# Patient Record
Sex: MALE | Race: WHITE | NOT HISPANIC OR LATINO | URBAN - METROPOLITAN AREA
[De-identification: names, ages, dates, MRNs, and addresses within clinical notes are randomized per-mention and may not be internally consistent; named-entity substitution may affect disease eponyms.]

---

## 2019-02-28 ENCOUNTER — OFFICE (OUTPATIENT)
Dept: URBAN - METROPOLITAN AREA CLINIC 42 | Facility: CLINIC | Age: 79
End: 2019-02-28

## 2019-02-28 VITALS — WEIGHT: 162 LBS

## 2019-02-28 DIAGNOSIS — R63.4 ABNORMAL WEIGHT LOSS: ICD-10-CM

## 2019-02-28 DIAGNOSIS — K52.9 NONINFECTIVE GASTROENTERITIS AND COLITIS, UNSPECIFIED: ICD-10-CM

## 2019-02-28 DIAGNOSIS — R63.0 ANOREXIA: ICD-10-CM

## 2019-02-28 DIAGNOSIS — R10.32 LEFT LOWER QUADRANT PAIN: ICD-10-CM

## 2019-02-28 DIAGNOSIS — R10.31 RIGHT LOWER QUADRANT PAIN: ICD-10-CM

## 2019-02-28 PROCEDURE — 99203 OFFICE O/P NEW LOW 30 MIN: CPT | Performed by: NURSE PRACTITIONER

## 2019-03-17 ENCOUNTER — INPATIENT HOSPITAL (OUTPATIENT)
Dept: URBAN - METROPOLITAN AREA HOSPITAL 90 | Facility: HOSPITAL | Age: 79
End: 2019-03-17
Payer: MEDICARE

## 2019-03-17 DIAGNOSIS — R11.0 NAUSEA: ICD-10-CM

## 2019-03-17 DIAGNOSIS — R93.3 ABNORMAL FINDINGS ON DIAGNOSTIC IMAGING OF OTHER PARTS OF DI: ICD-10-CM

## 2019-03-17 DIAGNOSIS — K57.32 DIVERTICULITIS OF LARGE INTESTINE WITHOUT PERFORATION OR ABS: ICD-10-CM

## 2019-03-17 PROCEDURE — 99223 1ST HOSP IP/OBS HIGH 75: CPT

## 2019-04-15 ENCOUNTER — ON CAMPUS - OUTPATIENT (OUTPATIENT)
Dept: URBAN - METROPOLITAN AREA HOSPITAL 91 | Facility: HOSPITAL | Age: 79
End: 2019-04-15
Payer: MEDICARE

## 2019-04-15 DIAGNOSIS — R63.4 ABNORMAL WEIGHT LOSS: ICD-10-CM

## 2019-04-15 DIAGNOSIS — K21.9 GASTRO-ESOPHAGEAL REFLUX DISEASE WITHOUT ESOPHAGITIS: ICD-10-CM

## 2019-04-15 DIAGNOSIS — K56.690 OTHER PARTIAL INTESTINAL OBSTRUCTION: ICD-10-CM

## 2019-04-15 DIAGNOSIS — R19.7 DIARRHEA, UNSPECIFIED: ICD-10-CM

## 2019-04-15 DIAGNOSIS — K57.30 DIVERTICULOSIS OF LARGE INTESTINE WITHOUT PERFORATION OR ABS: ICD-10-CM

## 2019-04-15 PROCEDURE — 45381 COLONOSCOPY SUBMUCOUS NJX: CPT

## 2019-04-15 PROCEDURE — 43239 EGD BIOPSY SINGLE/MULTIPLE: CPT

## 2019-04-15 PROCEDURE — 45380 COLONOSCOPY AND BIOPSY: CPT

## 2022-10-01 ENCOUNTER — APPOINTMENT (OUTPATIENT)
Dept: GENERAL RADIOLOGY | Facility: HOSPITAL | Age: 82
End: 2022-10-01

## 2022-10-01 ENCOUNTER — APPOINTMENT (OUTPATIENT)
Dept: CT IMAGING | Facility: HOSPITAL | Age: 82
End: 2022-10-01

## 2022-10-01 ENCOUNTER — HOSPITAL ENCOUNTER (INPATIENT)
Facility: HOSPITAL | Age: 82
LOS: 15 days | Discharge: HOSPICE | End: 2022-10-18
Attending: EMERGENCY MEDICINE | Admitting: STUDENT IN AN ORGANIZED HEALTH CARE EDUCATION/TRAINING PROGRAM

## 2022-10-01 DIAGNOSIS — R93.89 ABNORMAL CT OF THE CHEST: ICD-10-CM

## 2022-10-01 DIAGNOSIS — R10.84 GENERALIZED ABDOMINAL PAIN: ICD-10-CM

## 2022-10-01 DIAGNOSIS — A41.9 SEPSIS, DUE TO UNSPECIFIED ORGANISM, UNSPECIFIED WHETHER ACUTE ORGAN DYSFUNCTION PRESENT: ICD-10-CM

## 2022-10-01 DIAGNOSIS — K57.92 DIVERTICULITIS: Primary | ICD-10-CM

## 2022-10-01 DIAGNOSIS — R93.5 ABNORMAL CT OF THE ABDOMEN: ICD-10-CM

## 2022-10-01 LAB
ALBUMIN SERPL-MCNC: 3.5 G/DL (ref 3.5–5.2)
ALBUMIN/GLOB SERPL: 1.3 G/DL
ALP SERPL-CCNC: 77 U/L (ref 39–117)
ALT SERPL W P-5'-P-CCNC: 93 U/L (ref 1–41)
ANION GAP SERPL CALCULATED.3IONS-SCNC: 10 MMOL/L (ref 5–15)
AST SERPL-CCNC: 118 U/L (ref 1–40)
BASOPHILS # BLD AUTO: 0.09 10*3/MM3 (ref 0–0.2)
BASOPHILS NFR BLD AUTO: 0.4 % (ref 0–1.5)
BILIRUB SERPL-MCNC: 0.6 MG/DL (ref 0–1.2)
BILIRUB UR QL STRIP: NEGATIVE
BUN SERPL-MCNC: 14 MG/DL (ref 8–23)
BUN/CREAT SERPL: 22.2 (ref 7–25)
CALCIUM SPEC-SCNC: 9 MG/DL (ref 8.6–10.5)
CHLORIDE SERPL-SCNC: 102 MMOL/L (ref 98–107)
CLARITY UR: CLEAR
CO2 SERPL-SCNC: 26 MMOL/L (ref 22–29)
COLOR UR: YELLOW
CREAT SERPL-MCNC: 0.63 MG/DL (ref 0.76–1.27)
D-LACTATE SERPL-SCNC: 2.5 MMOL/L (ref 0.5–2)
DEPRECATED RDW RBC AUTO: 40.3 FL (ref 37–54)
EGFRCR SERPLBLD CKD-EPI 2021: 94.4 ML/MIN/1.73
EOSINOPHIL # BLD AUTO: 0.09 10*3/MM3 (ref 0–0.4)
EOSINOPHIL NFR BLD AUTO: 0.4 % (ref 0.3–6.2)
ERYTHROCYTE [DISTWIDTH] IN BLOOD BY AUTOMATED COUNT: 12 % (ref 12.3–15.4)
GLOBULIN UR ELPH-MCNC: 2.7 GM/DL
GLUCOSE SERPL-MCNC: 167 MG/DL (ref 65–99)
GLUCOSE UR STRIP-MCNC: NEGATIVE MG/DL
HCT VFR BLD AUTO: 45.4 % (ref 37.5–51)
HGB BLD-MCNC: 15.8 G/DL (ref 13–17.7)
HGB UR QL STRIP.AUTO: NEGATIVE
HOLD SPECIMEN: NORMAL
HOLD SPECIMEN: NORMAL
IMM GRANULOCYTES # BLD AUTO: 0.1 10*3/MM3 (ref 0–0.05)
IMM GRANULOCYTES NFR BLD AUTO: 0.5 % (ref 0–0.5)
KETONES UR QL STRIP: NEGATIVE
LEUKOCYTE ESTERASE UR QL STRIP.AUTO: NEGATIVE
LIPASE SERPL-CCNC: 13 U/L (ref 13–60)
LYMPHOCYTES # BLD AUTO: 3.11 10*3/MM3 (ref 0.7–3.1)
LYMPHOCYTES NFR BLD AUTO: 15.1 % (ref 19.6–45.3)
MCH RBC QN AUTO: 31.5 PG (ref 26.6–33)
MCHC RBC AUTO-ENTMCNC: 34.8 G/DL (ref 31.5–35.7)
MCV RBC AUTO: 90.4 FL (ref 79–97)
MONOCYTES # BLD AUTO: 1.15 10*3/MM3 (ref 0.1–0.9)
MONOCYTES NFR BLD AUTO: 5.6 % (ref 5–12)
NEUTROPHILS NFR BLD AUTO: 16.07 10*3/MM3 (ref 1.7–7)
NEUTROPHILS NFR BLD AUTO: 78 % (ref 42.7–76)
NITRITE UR QL STRIP: NEGATIVE
NRBC BLD AUTO-RTO: 0 /100 WBC (ref 0–0.2)
NT-PROBNP SERPL-MCNC: 206 PG/ML (ref 0–1800)
PH UR STRIP.AUTO: 7 [PH] (ref 5–8)
PLATELET # BLD AUTO: 216 10*3/MM3 (ref 140–450)
PMV BLD AUTO: 10.2 FL (ref 6–12)
POTASSIUM SERPL-SCNC: 3.7 MMOL/L (ref 3.5–5.2)
PROCALCITONIN SERPL-MCNC: 0.09 NG/ML (ref 0–0.25)
PROT SERPL-MCNC: 6.2 G/DL (ref 6–8.5)
PROT UR QL STRIP: NEGATIVE
QT INTERVAL: 361 MS
RBC # BLD AUTO: 5.02 10*6/MM3 (ref 4.14–5.8)
SODIUM SERPL-SCNC: 138 MMOL/L (ref 136–145)
SP GR UR STRIP: 1.02 (ref 1–1.03)
TROPONIN T SERPL-MCNC: <0.01 NG/ML (ref 0–0.03)
UROBILINOGEN UR QL STRIP: NORMAL
WBC NRBC COR # BLD: 20.61 10*3/MM3 (ref 3.4–10.8)
WHOLE BLOOD HOLD COAG: NORMAL
WHOLE BLOOD HOLD SPECIMEN: NORMAL

## 2022-10-01 PROCEDURE — 84484 ASSAY OF TROPONIN QUANT: CPT | Performed by: PHYSICIAN ASSISTANT

## 2022-10-01 PROCEDURE — 87040 BLOOD CULTURE FOR BACTERIA: CPT | Performed by: PHYSICIAN ASSISTANT

## 2022-10-01 PROCEDURE — 25010000002 VANCOMYCIN 10 G RECONSTITUTED SOLUTION: Performed by: PHYSICIAN ASSISTANT

## 2022-10-01 PROCEDURE — 81003 URINALYSIS AUTO W/O SCOPE: CPT | Performed by: EMERGENCY MEDICINE

## 2022-10-01 PROCEDURE — 84145 PROCALCITONIN (PCT): CPT | Performed by: PHYSICIAN ASSISTANT

## 2022-10-01 PROCEDURE — 83690 ASSAY OF LIPASE: CPT | Performed by: EMERGENCY MEDICINE

## 2022-10-01 PROCEDURE — 25010000002 CEFEPIME PER 500 MG: Performed by: PHYSICIAN ASSISTANT

## 2022-10-01 PROCEDURE — 25010000002 ONDANSETRON PER 1 MG: Performed by: EMERGENCY MEDICINE

## 2022-10-01 PROCEDURE — 99285 EMERGENCY DEPT VISIT HI MDM: CPT

## 2022-10-01 PROCEDURE — 93010 ELECTROCARDIOGRAM REPORT: CPT | Performed by: STUDENT IN AN ORGANIZED HEALTH CARE EDUCATION/TRAINING PROGRAM

## 2022-10-01 PROCEDURE — 85025 COMPLETE CBC W/AUTO DIFF WBC: CPT | Performed by: EMERGENCY MEDICINE

## 2022-10-01 PROCEDURE — 71045 X-RAY EXAM CHEST 1 VIEW: CPT

## 2022-10-01 PROCEDURE — 36415 COLL VENOUS BLD VENIPUNCTURE: CPT

## 2022-10-01 PROCEDURE — 0 IOPAMIDOL PER 1 ML: Performed by: EMERGENCY MEDICINE

## 2022-10-01 PROCEDURE — 25010000002 MORPHINE PER 10 MG: Performed by: EMERGENCY MEDICINE

## 2022-10-01 PROCEDURE — 83880 ASSAY OF NATRIURETIC PEPTIDE: CPT | Performed by: PHYSICIAN ASSISTANT

## 2022-10-01 PROCEDURE — 93005 ELECTROCARDIOGRAM TRACING: CPT | Performed by: PHYSICIAN ASSISTANT

## 2022-10-01 PROCEDURE — 80053 COMPREHEN METABOLIC PANEL: CPT | Performed by: EMERGENCY MEDICINE

## 2022-10-01 PROCEDURE — 74177 CT ABD & PELVIS W/CONTRAST: CPT

## 2022-10-01 PROCEDURE — 83605 ASSAY OF LACTIC ACID: CPT | Performed by: EMERGENCY MEDICINE

## 2022-10-01 PROCEDURE — 71275 CT ANGIOGRAPHY CHEST: CPT

## 2022-10-01 RX ORDER — ONDANSETRON 2 MG/ML
4 INJECTION INTRAMUSCULAR; INTRAVENOUS ONCE
Status: COMPLETED | OUTPATIENT
Start: 2022-10-01 | End: 2022-10-01

## 2022-10-01 RX ORDER — SODIUM CHLORIDE 0.9 % (FLUSH) 0.9 %
10 SYRINGE (ML) INJECTION AS NEEDED
Status: DISCONTINUED | OUTPATIENT
Start: 2022-10-01 | End: 2022-10-18 | Stop reason: HOSPADM

## 2022-10-01 RX ORDER — SODIUM CHLORIDE 0.9 % (FLUSH) 0.9 %
10 SYRINGE (ML) INJECTION AS NEEDED
Status: DISCONTINUED | OUTPATIENT
Start: 2022-10-01 | End: 2022-10-01

## 2022-10-01 RX ORDER — MORPHINE SULFATE 2 MG/ML
2 INJECTION, SOLUTION INTRAMUSCULAR; INTRAVENOUS ONCE
Status: COMPLETED | OUTPATIENT
Start: 2022-10-01 | End: 2022-10-01

## 2022-10-01 RX ADMIN — CEFEPIME 2 G: 2 INJECTION, POWDER, FOR SOLUTION INTRAVENOUS at 20:45

## 2022-10-01 RX ADMIN — ONDANSETRON 4 MG: 2 INJECTION INTRAMUSCULAR; INTRAVENOUS at 22:16

## 2022-10-01 RX ADMIN — VANCOMYCIN HYDROCHLORIDE 1750 MG: 10 INJECTION, POWDER, LYOPHILIZED, FOR SOLUTION INTRAVENOUS at 22:13

## 2022-10-01 RX ADMIN — MORPHINE SULFATE 2 MG: 2 INJECTION, SOLUTION INTRAMUSCULAR; INTRAVENOUS at 22:16

## 2022-10-01 RX ADMIN — SODIUM CHLORIDE 1000 ML: 9 INJECTION, SOLUTION INTRAVENOUS at 22:04

## 2022-10-01 RX ADMIN — IOPAMIDOL 100 ML: 755 INJECTION, SOLUTION INTRAVENOUS at 21:13

## 2022-10-01 RX ADMIN — SODIUM CHLORIDE 500 ML: 9 INJECTION, SOLUTION INTRAVENOUS at 20:40

## 2022-10-01 NOTE — ED TRIAGE NOTES
"Pt to ER from home via PRP (incident # [id number]) with c/o RLQ abd pain and n/v for a \"few\" hours. Pt unable to elaborate further. Pt alert and oriented however speech is garbled, which is baseline. Pt placed on2L NC by EMS.       Pt masked in triage, staff in appropriate ppe.    "

## 2022-10-01 NOTE — ED PROVIDER NOTES
EMERGENCY DEPARTMENT ENCOUNTER    Room Number:  06/06  Date of encounter:  10/2/2022  PCP: System, Provider Not In  Historian: Patient      HPI:  Chief Complaint: Right lower quadrant pain     A complete HPI/ROS/PMH/PSH/SH/FH are unobtainable due to: Nothing    Context: Boni Vega is a 81 y.o. male who presents from home to the ED c/o lower abdomen and right lower quadrant pain began few hours PTA.  Patient states the pain is associated with nausea and vomiting.  He was noted by the RN staff when patient arrived that his SPO2 was between 90 and 92%.  He was initially placed on 2 L O2.  Per the family the patient has a past medical history of aspiration and given the vomiting that preceded before his arrival they are unsure if this may have occurred.  Patient states his pain is moderate at this time.  Nothing makes it better or worse.  It does wax and wane in severity slightly.  He denies fever, chills, chest pain, abnormal bowel movements.    Patient has had a cholecystectomy in the past.      PAST MEDICAL HISTORY  Active Ambulatory Problems     Diagnosis Date Noted   • No Active Ambulatory Problems     Resolved Ambulatory Problems     Diagnosis Date Noted   • No Resolved Ambulatory Problems     No Additional Past Medical History         PAST SURGICAL HISTORY  No past surgical history on file.      FAMILY HISTORY  No family history on file.      SOCIAL HISTORY  Social History     Socioeconomic History   • Marital status: Single         ALLERGIES  Patient has no known allergies.        REVIEW OF SYSTEMS  Review of Systems   Constitutional: Negative for chills and fever.   HENT: Negative.    Respiratory: Positive for shortness of breath.    Cardiovascular: Negative for chest pain and palpitations.   Gastrointestinal: Positive for abdominal pain and nausea.   Genitourinary: Negative.    Musculoskeletal: Negative.    Skin: Negative.    Neurological: Negative.    Psychiatric/Behavioral: Negative.         All systems  reviewed and negative except for those discussed in HPI.       PHYSICAL EXAM    I have reviewed the triage vital signs and nursing notes.    ED Triage Vitals [10/01/22 1921]   Temp Heart Rate Resp BP SpO2   98.9 °F (37.2 °C) 91 16 142/94 93 %      Temp src Heart Rate Source Patient Position BP Location FiO2 (%)   -- -- -- -- --       Physical Exam  GENERAL: Frail in appearance, appears somewhat uncomfortable  HENT: nares patent, mucous brains dry  EYES: no scleral icterus  CV: regular rhythm, regular rate  RESPIRATORY: Slight rales rhonchi at the base of bilateral lungs, patient is slightly tachypneic  ABDOMEN: TTP lower abdomen, most tender right lower quadrant MUSCULOSKELETAL: no deformity  NEURO: alert and oriented x2, moves all extremities, follows commands, patient has obvious right-sided weakness which is said to be chronic per the family   SKIN: warm, dry        LAB RESULTS  Recent Results (from the past 24 hour(s))   Comprehensive Metabolic Panel    Collection Time: 10/01/22  7:39 PM    Specimen: Blood   Result Value Ref Range    Glucose 167 (H) 65 - 99 mg/dL    BUN 14 8 - 23 mg/dL    Creatinine 0.63 (L) 0.76 - 1.27 mg/dL    Sodium 138 136 - 145 mmol/L    Potassium 3.7 3.5 - 5.2 mmol/L    Chloride 102 98 - 107 mmol/L    CO2 26.0 22.0 - 29.0 mmol/L    Calcium 9.0 8.6 - 10.5 mg/dL    Total Protein 6.2 6.0 - 8.5 g/dL    Albumin 3.50 3.50 - 5.20 g/dL    ALT (SGPT) 93 (H) 1 - 41 U/L    AST (SGOT) 118 (H) 1 - 40 U/L    Alkaline Phosphatase 77 39 - 117 U/L    Total Bilirubin 0.6 0.0 - 1.2 mg/dL    Globulin 2.7 gm/dL    A/G Ratio 1.3 g/dL    BUN/Creatinine Ratio 22.2 7.0 - 25.0    Anion Gap 10.0 5.0 - 15.0 mmol/L    eGFR 94.4 >60.0 mL/min/1.73   Lipase    Collection Time: 10/01/22  7:39 PM    Specimen: Blood   Result Value Ref Range    Lipase 13 13 - 60 U/L   Green Top (Gel)    Collection Time: 10/01/22  7:39 PM   Result Value Ref Range    Extra Tube Hold for add-ons.    Lavender Top    Collection Time: 10/01/22   7:39 PM   Result Value Ref Range    Extra Tube hold for add-on    Gold Top - SST    Collection Time: 10/01/22  7:39 PM   Result Value Ref Range    Extra Tube Hold for add-ons.    Light Blue Top    Collection Time: 10/01/22  7:39 PM   Result Value Ref Range    Extra Tube Hold for add-ons.    CBC Auto Differential    Collection Time: 10/01/22  7:39 PM    Specimen: Blood   Result Value Ref Range    WBC 20.61 (H) 3.40 - 10.80 10*3/mm3    RBC 5.02 4.14 - 5.80 10*6/mm3    Hemoglobin 15.8 13.0 - 17.7 g/dL    Hematocrit 45.4 37.5 - 51.0 %    MCV 90.4 79.0 - 97.0 fL    MCH 31.5 26.6 - 33.0 pg    MCHC 34.8 31.5 - 35.7 g/dL    RDW 12.0 (L) 12.3 - 15.4 %    RDW-SD 40.3 37.0 - 54.0 fl    MPV 10.2 6.0 - 12.0 fL    Platelets 216 140 - 450 10*3/mm3    Neutrophil % 78.0 (H) 42.7 - 76.0 %    Lymphocyte % 15.1 (L) 19.6 - 45.3 %    Monocyte % 5.6 5.0 - 12.0 %    Eosinophil % 0.4 0.3 - 6.2 %    Basophil % 0.4 0.0 - 1.5 %    Immature Grans % 0.5 0.0 - 0.5 %    Neutrophils, Absolute 16.07 (H) 1.70 - 7.00 10*3/mm3    Lymphocytes, Absolute 3.11 (H) 0.70 - 3.10 10*3/mm3    Monocytes, Absolute 1.15 (H) 0.10 - 0.90 10*3/mm3    Eosinophils, Absolute 0.09 0.00 - 0.40 10*3/mm3    Basophils, Absolute 0.09 0.00 - 0.20 10*3/mm3    Immature Grans, Absolute 0.10 (H) 0.00 - 0.05 10*3/mm3    nRBC 0.0 0.0 - 0.2 /100 WBC   Procalcitonin    Collection Time: 10/01/22  7:39 PM    Specimen: Blood   Result Value Ref Range    Procalcitonin 0.09 0.00 - 0.25 ng/mL   BNP    Collection Time: 10/01/22  7:39 PM    Specimen: Blood   Result Value Ref Range    proBNP 206.0 0.0 - 1,800.0 pg/mL   Troponin    Collection Time: 10/01/22  7:39 PM    Specimen: Blood   Result Value Ref Range    Troponin T <0.010 0.000 - 0.030 ng/mL   Lactic Acid, Plasma    Collection Time: 10/01/22  7:56 PM    Specimen: Blood   Result Value Ref Range    Lactate 2.5 (C) 0.5 - 2.0 mmol/L   Urinalysis With Microscopic If Indicated (No Culture) - Urine, Clean Catch    Collection Time: 10/01/22   8:34 PM    Specimen: Urine, Clean Catch   Result Value Ref Range    Color, UA Yellow Yellow, Straw    Appearance, UA Clear Clear    pH, UA 7.0 5.0 - 8.0    Specific Gravity, UA 1.016 1.005 - 1.030    Glucose, UA Negative Negative    Ketones, UA Negative Negative    Bilirubin, UA Negative Negative    Blood, UA Negative Negative    Protein, UA Negative Negative    Leuk Esterase, UA Negative Negative    Nitrite, UA Negative Negative    Urobilinogen, UA 1.0 E.U./dL 0.2 - 1.0 E.U./dL   ECG 12 Lead    Collection Time: 10/01/22  8:42 PM   Result Value Ref Range    QT Interval 361 ms   STAT Lactic Acid, Reflex    Collection Time: 10/01/22 11:52 PM    Specimen: Blood   Result Value Ref Range    Lactate 3.4 (C) 0.5 - 2.0 mmol/L       Ordered the above labs and independently reviewed the results.        RADIOLOGY  CT Abdomen Pelvis With Contrast, CT Angiogram Chest    Result Date: 10/1/2022  CT ANGIOGRAM OF THE CHEST; CT OF THE ABDOMEN AND PELVIS WITH CONTRAST  HISTORY: Hypoxia and tachypnea. Abdominal pain.  COMPARISON: 01/23/2007  TECHNIQUE: Axial CT imaging was obtained through the thorax. IV contrast was administered. Three-D reformatted images were obtained. Axial CT imaging was obtained through the abdomen and pelvis. IV contrast was administered.  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 Proximal descending thoracic aorta is dilated, measuring 3.3 cm. Distal descending thoracic aorta tapers to normal caliber. No acute pulmonary thromboembolus is seen. The thyroid gland, trachea, and esophagus appear unremarkable. There are coronary artery calcifications. There are advanced background emphysematous changes. Dependent consolidation is noted bilaterally. Some of this may reflect atelectasis, but pneumonia may be present as well, particularly within the left lower lobe. The patient has a nodule within the left upper lobe, which measures up to 1.5 cm.  Malignancy cannot be excluded. There is also an enlarged left hilar node, which measures up to 2.3 x 1.5 cm. Patient does appear to have some debris within the bronchi to the right lower lobe. Thyroid gland and trachea are unremarkable. Left-sided generator is noted overlying the left chest wall. There are changes of prior vertebroplasty at L1.  CT OF THE ABDOMEN AND PELVIS: Images are severely compromised by motion and streak artifact. Heterogeneous lesions are seen within the right lobe of the liver, difficult to fully assess, given the presence of the streak artifact from the patient's arms. However, the larger measures up to 7.9 x 5.4 cm. Malignancy cannot be excluded. 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 Patient does have intraluminal mural thrombus. Prostate gland contains dystrophic calcifications. Urinary bladder contains excreted contrast material. There is colonic diverticulosis. Proximal sigmoid colon does appear thick walled, with some adjacent pericolonic soft tissue stranding. Diverticulitis is not excluded. There is no extraluminal gas or organized fluid collection suggest perforation with abscess formation. Patient does have some free fluid within the paracolic gutters. The patient is osteoporotic. Intramedullary ritesh is identified within the proximal left femur. Calcified granulomata are seen within the spleen. Pancreas is atrophic. Gallbladder is absent. Kidneys enhance symmetrically. There is no hydronephrosis.       1. Bibasilar consolidation may reflect atelectasis, although correlation with any evidence of pneumonia is recommended. 2. Left upper lobe pulmonary nodule, as well as enlarged left hilar lymph node. Malignancy cannot be excluded. 3. Apparent heterogeneous lesions within the right lobe of the liver. Neoplasm versus abscess would be considerations. Full assessment of the liver lesions is severely limited due to streak artifact  and motion artifact. Liver protocol CT or MRI could be considered for further assessment. 4. Diverticulosis, as well as a thick walled segment of sigmoid colon. This may reflect diverticulitis, although follow-up colonoscopy is suggested to exclude any underlying neoplasm. 5. Patient does have some free fluid tracking along the paracolic gutters bilaterally. There may be some additional free fluid around the liver and spleen.  Radiation dose reduction techniques were utilized, including automated exposure control and exposure modulation based on body size.  This report was finalized on 10/1/2022 10:42 PM by Dr. Soheila Mann M.D.      XR Chest 1 View    Result Date: 10/1/2022  SINGLE VIEW OF THE CHEST  HISTORY: Shortness of breath and hypoxia  COMPARISON: None available.  FINDINGS: Cardiomegaly is present. There is no vascular congestion. Background emphysematous changes are seen. There is some nonspecific bibasilar consolidation, left greater than right. No obvious pneumothorax or pleural effusion is seen. Left-sided generator is seen overlying the left chest.      Nonspecific bibasilar consolidation.  This report was finalized on 10/1/2022 11:00 PM by Dr. Soheila Mann M.D.        I ordered the above noted radiological studies. Reviewed by me and discussed with radiologist.  See dictation for official radiology interpretation.      PROCEDURES    Critical Care  Performed by: Jacob Yang III, PA  Authorized by: Tremaine Haro MD     Critical care provider statement:     Critical care time (minutes):  33    Critical care time was exclusive of:  Separately billable procedures and treating other patients    Critical care was necessary to treat or prevent imminent or life-threatening deterioration of the following conditions:  Circulatory failure and sepsis    Critical care was time spent personally by me on the following activities:  Blood draw for specimens, development of treatment plan with  patient or surrogate, discussions with consultants, evaluation of patient's response to treatment, examination of patient, obtaining history from patient or surrogate, ordering and performing treatments and interventions, ordering and review of laboratory studies, ordering and review of radiographic studies, pulse oximetry, re-evaluation of patient's condition and review of old charts    I assumed direction of critical care for this patient from another provider in my specialty: yes      Care discussed with: admitting provider            MEDICATIONS GIVEN IN ER    Medications   sodium chloride 0.9 % flush 10 mL (has no administration in time range)   sodium chloride 0.9 % flush 10 mL (10 mL Intravenous Given 10/2/22 0041)   sodium chloride 0.9 % flush 10 mL (has no administration in time range)   nitroglycerin (NITROSTAT) SL tablet 0.4 mg (has no administration in time range)   acetaminophen (TYLENOL) tablet 650 mg (has no administration in time range)     Or   acetaminophen (TYLENOL) 160 MG/5ML solution 650 mg (has no administration in time range)     Or   acetaminophen (TYLENOL) suppository 650 mg (has no administration in time range)   ondansetron (ZOFRAN) tablet 4 mg (has no administration in time range)     Or   ondansetron (ZOFRAN) injection 4 mg (has no administration in time range)   calcium carbonate (TUMS) chewable tablet 500 mg (200 mg elemental) (has no administration in time range)   sodium chloride 0.9 % infusion (100 mL/hr Intravenous Currently Infusing 10/2/22 0426)   Pharmacy to dose vancomycin (has no administration in time range)   Pharmacy Consult - Pharmacy to dose (has no administration in time range)   morphine injection 2 mg (has no administration in time range)   ipratropium-albuterol (DUO-NEB) nebulizer solution 3 mL (has no administration in time range)   ipratropium-albuterol (DUO-NEB) nebulizer solution 3 mL (has no administration in time range)   cefepime 2 gm IVPB in 100 ml NS (VTB) (2  g Intravenous New Bag 10/2/22 0453)   morphine injection 2 mg (has no administration in time range)   ondansetron (ZOFRAN) injection 4 mg (has no administration in time range)   sodium chloride 0.9 % bolus 500 mL (0 mL Intravenous Stopped 10/1/22 2128)   cefepime 2 gm IVPB in 100 ml NS (VTB) (0 g Intravenous Stopped 10/1/22 2128)   vancomycin 1750 mg/500 mL 0.9% NS IVPB ([facility]) (0 mg/kg × 83.9 kg Intravenous Stopped 10/2/22 0019)   iopamidol (ISOVUE-370) 76 % injection 100 mL (100 mL Intravenous Given 10/1/22 2113)   sodium chloride 0.9 % bolus 1,000 mL (0 mL Intravenous Stopped 10/1/22 2306)   morphine injection 2 mg (2 mg Intravenous Given 10/1/22 2216)   ondansetron (ZOFRAN) injection 4 mg (4 mg Intramuscular Given 10/1/22 2216)   sodium chloride 0.9 % bolus 2,517 mL (0 mL/kg × 83.9 kg Intravenous Stopped 10/2/22 0143)         PROGRESS, DATA ANALYSIS, CONSULTS, AND MEDICAL DECISION MAKING    All labs have been independently reviewed by me.  All radiology studies have been reviewed by me and discussed with radiologist dictating the report.   EKG's independently viewed and interpreted by me.  Discussion below represents my analysis of pertinent findings related to patient's condition, differential diagnosis, treatment plan and final disposition.    DDx for the abdominal pain: SBO, partial SBO, acute appendicitis, pancreatitis, colitis, ischemic bowel, hernia, ureteral stone, cystitis, pyelonephritis, epiploic appendagitis, other intra-abdominal process.    DDx for the shortness of breath/hypoxemia: CHF, PNA, aspiration injury, COVID-19, PE, other viral illness.    Will obtain CBC, CMP, lipase, blood cultures, lactate, Pro-J Luis, EKG, BNP, troponin, portable chest x-ray, CT abdomen pelvis, CTA of the chest to further evaluate.    ED Course as of 10/02/22 0529   Sat Oct 01, 2022   1957 BP: 142/94 [RC]   1957 Temp: 98.9 °F (37.2 °C) [RC]   1957 Heart Rate: 91 [RC]   1957 Resp: 16 [RC]   1957 SpO2: 93 %  NC []   1957  WBC(!): 20.61 [RC]   1957 RBC: 5.02 [RC]   1957 Hemoglobin: 15.8 [RC]   1957 Hematocrit: 45.4 [RC]   1957 Platelets: 216 [RC]   2028 Glucose(!): 167 [RC]   2028 BUN: 14 [RC]   2028 Creatinine(!): 0.63 [RC]   2028 Sodium: 138 [RC]   2028 CO2: 26.0 [RC]   2028 Anion Gap: 10.0 [RC]   2028 ALT (SGPT)(!): 93 [RC]   2028 AST (SGOT)(!): 118 [RC]   2028 Alkaline Phosphatase: 77 [RC]   2028 Total Bilirubin: 0.6 [RC]   2036 Basophils Absolute: 0.09 [RC]   2037 RN reports the patient systolic blood pressure is now slightly over 100.  Still waiting on the BNP and chest x-ray and the patient does sound a little wet to me on exam.  We will go ahead and give a 500 cc bolus.  This patient is almost certainly septic.  We will start cefepime and Zosyn. [RC]   2048 EKG    Limited by artifact from spinal stimulator    EKG time: 0842  Rhythm/Rate: 94/sinus  P waves and IA: prolonged pr interval  QRS, axis: LAD   ST and T waves: No obvious st cgs, non-specific t wave cgs inf leads.     Interpreted Contemporaneously by me, independently viewed  -no prior [RC]   2305 CT angio of the chest shows questionable pneumonia.  CT abdomen pelvis shows likely diverticulitis and there is some question as to a liver abscess versus..  Patient has been covered with cefepime and vancomycin.  He has received IV fluids and is remained stable throughout his 4 hours in the emergency department.  Will place call the hospitalist to discuss admission to telemetry bed.  Working diagnosis will be sepsis, diverticulitis, abnormal CT chest, abnormal CT abdomen.  Patient's vitals have remained stable throughout his 4-4 and half hour ER stay. [RC]   5975 Discussed patient's case with OSMANI Jensen with [facility].  To admit to Dr. Gomez in a tele bed [RC]   Sun Oct 02, 2022   0032 Patient is a hold in the ER.  Vital signs remained stable.  His lactate actually went up.  He seems to have tolerated the first liter and a half of fluid.  We will give the complete  sepsis bolus at this time and then start the drip that was ordered on the floor.  We will continue to monitor closely and should anything change for the worse, we will notify the hospitalist and consult the ICU. [RC]   0529 Patient reports to the RN his pain is starting to worsen in the lower abdomen.  He did well with the IV morphine earlier.  We will give another small 2 mg dose via IV in an effort to better control his pain. [RC]      ED Course User Index  [RC] Jacob Yang III, PA           PPE: The patient wore a surgical mask throughout the entire patient encounter. I wore an N95.    AS OF 05:29 EDT VITALS:    BP - 105/77  HR - 86  TEMP - 98.9 °F (37.2 °C)  O2 SATS - 91%        DIAGNOSIS  Final diagnoses:   Diverticulitis   Sepsis, due to unspecified organism, unspecified whether acute organ dysfunction present (HCC)   Abnormal CT of the chest   Abnormal CT of the abdomen         DISPOSITION  ADMISSION    Discussed treatment plan and reason for admission with pt/family and admitting physician.  Pt/family voiced understanding of the plan for admission for further testing/treatment as needed.              Jacob Yang III, PA  10/02/22 0001       Jacob Yang III, PA  10/02/22 0513       Jacob Yang III, PA  10/02/22 0529

## 2022-10-02 LAB
ANION GAP SERPL CALCULATED.3IONS-SCNC: 9.8 MMOL/L (ref 5–15)
BUN SERPL-MCNC: 13 MG/DL (ref 8–23)
BUN/CREAT SERPL: 21 (ref 7–25)
CALCIUM SPEC-SCNC: 7.7 MG/DL (ref 8.6–10.5)
CHLORIDE SERPL-SCNC: 106 MMOL/L (ref 98–107)
CO2 SERPL-SCNC: 23.2 MMOL/L (ref 22–29)
CREAT SERPL-MCNC: 0.62 MG/DL (ref 0.76–1.27)
D-LACTATE SERPL-SCNC: 2 MMOL/L (ref 0.5–2)
D-LACTATE SERPL-SCNC: 2.2 MMOL/L (ref 0.5–2)
D-LACTATE SERPL-SCNC: 2.4 MMOL/L (ref 0.5–2)
D-LACTATE SERPL-SCNC: 2.4 MMOL/L (ref 0.5–2)
D-LACTATE SERPL-SCNC: 3.4 MMOL/L (ref 0.5–2)
DEPRECATED RDW RBC AUTO: 40.8 FL (ref 37–54)
EGFRCR SERPLBLD CKD-EPI 2021: 94.8 ML/MIN/1.73
ERYTHROCYTE [DISTWIDTH] IN BLOOD BY AUTOMATED COUNT: 12 % (ref 12.3–15.4)
GLUCOSE SERPL-MCNC: 144 MG/DL (ref 65–99)
HCT VFR BLD AUTO: 36.2 % (ref 37.5–51)
HGB BLD-MCNC: 12.4 G/DL (ref 13–17.7)
MCH RBC QN AUTO: 31.4 PG (ref 26.6–33)
MCHC RBC AUTO-ENTMCNC: 34.3 G/DL (ref 31.5–35.7)
MCV RBC AUTO: 91.6 FL (ref 79–97)
PLATELET # BLD AUTO: 177 10*3/MM3 (ref 140–450)
PMV BLD AUTO: 10.4 FL (ref 6–12)
POTASSIUM SERPL-SCNC: 3.8 MMOL/L (ref 3.5–5.2)
RBC # BLD AUTO: 3.95 10*6/MM3 (ref 4.14–5.8)
SODIUM SERPL-SCNC: 139 MMOL/L (ref 136–145)
WBC NRBC COR # BLD: 17.59 10*3/MM3 (ref 3.4–10.8)

## 2022-10-02 PROCEDURE — 99204 OFFICE O/P NEW MOD 45 MIN: CPT | Performed by: INTERNAL MEDICINE

## 2022-10-02 PROCEDURE — 25010000002 CEFEPIME PER 500 MG: Performed by: NURSE PRACTITIONER

## 2022-10-02 PROCEDURE — 25010000002 MORPHINE PER 10 MG: Performed by: EMERGENCY MEDICINE

## 2022-10-02 PROCEDURE — G0378 HOSPITAL OBSERVATION PER HR: HCPCS

## 2022-10-02 PROCEDURE — 25010000002 VANCOMYCIN PER 500 MG: Performed by: NURSE PRACTITIONER

## 2022-10-02 PROCEDURE — 85027 COMPLETE CBC AUTOMATED: CPT | Performed by: NURSE PRACTITIONER

## 2022-10-02 PROCEDURE — 36415 COLL VENOUS BLD VENIPUNCTURE: CPT | Performed by: EMERGENCY MEDICINE

## 2022-10-02 PROCEDURE — 25010000002 ONDANSETRON PER 1 MG: Performed by: EMERGENCY MEDICINE

## 2022-10-02 PROCEDURE — 80048 BASIC METABOLIC PNL TOTAL CA: CPT | Performed by: NURSE PRACTITIONER

## 2022-10-02 PROCEDURE — 25010000002 MORPHINE PER 10 MG: Performed by: NURSE PRACTITIONER

## 2022-10-02 PROCEDURE — 25010000002 ENOXAPARIN PER 10 MG: Performed by: STUDENT IN AN ORGANIZED HEALTH CARE EDUCATION/TRAINING PROGRAM

## 2022-10-02 PROCEDURE — 83605 ASSAY OF LACTIC ACID: CPT | Performed by: EMERGENCY MEDICINE

## 2022-10-02 PROCEDURE — 99223 1ST HOSP IP/OBS HIGH 75: CPT | Performed by: SURGERY

## 2022-10-02 RX ORDER — MONTELUKAST SODIUM 10 MG/1
10 TABLET ORAL NIGHTLY
Status: DISCONTINUED | OUTPATIENT
Start: 2022-10-02 | End: 2022-10-18 | Stop reason: HOSPADM

## 2022-10-02 RX ORDER — SODIUM CHLORIDE 0.9 % (FLUSH) 0.9 %
10 SYRINGE (ML) INJECTION EVERY 12 HOURS SCHEDULED
Status: DISCONTINUED | OUTPATIENT
Start: 2022-10-02 | End: 2022-10-13

## 2022-10-02 RX ORDER — ONDANSETRON 4 MG/1
4 TABLET, FILM COATED ORAL EVERY 6 HOURS PRN
Status: DISCONTINUED | OUTPATIENT
Start: 2022-10-02 | End: 2022-10-13

## 2022-10-02 RX ORDER — IPRATROPIUM BROMIDE AND ALBUTEROL SULFATE 2.5; .5 MG/3ML; MG/3ML
3 SOLUTION RESPIRATORY (INHALATION) EVERY 4 HOURS PRN
Status: DISCONTINUED | OUTPATIENT
Start: 2022-10-02 | End: 2022-10-13

## 2022-10-02 RX ORDER — MULTIPLE VITAMINS W/ MINERALS TAB 9MG-400MCG
1 TAB ORAL DAILY
COMMUNITY
End: 2022-10-18 | Stop reason: HOSPADM

## 2022-10-02 RX ORDER — IPRATROPIUM BROMIDE AND ALBUTEROL SULFATE 2.5; .5 MG/3ML; MG/3ML
3 SOLUTION RESPIRATORY (INHALATION)
Status: DISCONTINUED | OUTPATIENT
Start: 2022-10-02 | End: 2022-10-02

## 2022-10-02 RX ORDER — CETIRIZINE HYDROCHLORIDE 10 MG/1
5 TABLET ORAL DAILY
Status: DISCONTINUED | OUTPATIENT
Start: 2022-10-02 | End: 2022-10-18 | Stop reason: HOSPADM

## 2022-10-02 RX ORDER — DULOXETIN HYDROCHLORIDE 60 MG/1
60 CAPSULE, DELAYED RELEASE ORAL DAILY
COMMUNITY

## 2022-10-02 RX ORDER — ASPIRIN 81 MG/1
81 TABLET, CHEWABLE ORAL DAILY
Status: DISCONTINUED | OUTPATIENT
Start: 2022-10-02 | End: 2022-10-02

## 2022-10-02 RX ORDER — HYDROMORPHONE HYDROCHLORIDE 1 MG/ML
0.5 INJECTION, SOLUTION INTRAMUSCULAR; INTRAVENOUS; SUBCUTANEOUS
Status: ACTIVE | OUTPATIENT
Start: 2022-10-02 | End: 2022-10-09

## 2022-10-02 RX ORDER — ONDANSETRON 2 MG/ML
4 INJECTION INTRAMUSCULAR; INTRAVENOUS EVERY 6 HOURS PRN
Status: DISCONTINUED | OUTPATIENT
Start: 2022-10-02 | End: 2022-10-13

## 2022-10-02 RX ORDER — LORATADINE 10 MG/1
10 TABLET ORAL DAILY
COMMUNITY
End: 2022-10-18 | Stop reason: HOSPADM

## 2022-10-02 RX ORDER — ASPIRIN 81 MG/1
81 TABLET, CHEWABLE ORAL DAILY
COMMUNITY
End: 2022-10-18 | Stop reason: HOSPADM

## 2022-10-02 RX ORDER — SODIUM CHLORIDE 9 MG/ML
100 INJECTION, SOLUTION INTRAVENOUS CONTINUOUS
Status: DISCONTINUED | OUTPATIENT
Start: 2022-10-02 | End: 2022-10-06

## 2022-10-02 RX ORDER — LISINOPRIL 20 MG/1
20 TABLET ORAL DAILY
COMMUNITY
End: 2022-10-18 | Stop reason: HOSPADM

## 2022-10-02 RX ORDER — PANTOPRAZOLE SODIUM 20 MG/1
20 TABLET, DELAYED RELEASE ORAL DAILY
COMMUNITY
End: 2022-10-18 | Stop reason: HOSPADM

## 2022-10-02 RX ORDER — CALCIUM CARBONATE 200(500)MG
2 TABLET,CHEWABLE ORAL 2 TIMES DAILY PRN
Status: DISCONTINUED | OUTPATIENT
Start: 2022-10-02 | End: 2022-10-13

## 2022-10-02 RX ORDER — MORPHINE SULFATE 2 MG/ML
2 INJECTION, SOLUTION INTRAMUSCULAR; INTRAVENOUS ONCE
Status: COMPLETED | OUTPATIENT
Start: 2022-10-02 | End: 2022-10-02

## 2022-10-02 RX ORDER — PANTOPRAZOLE SODIUM 20 MG/1
20 TABLET, DELAYED RELEASE ORAL DAILY
Status: DISCONTINUED | OUTPATIENT
Start: 2022-10-02 | End: 2022-10-03

## 2022-10-02 RX ORDER — TAMSULOSIN HYDROCHLORIDE 0.4 MG/1
1 CAPSULE ORAL NIGHTLY
COMMUNITY

## 2022-10-02 RX ORDER — MORPHINE SULFATE 2 MG/ML
2 INJECTION, SOLUTION INTRAMUSCULAR; INTRAVENOUS
Status: DISCONTINUED | OUTPATIENT
Start: 2022-10-02 | End: 2022-10-03

## 2022-10-02 RX ORDER — DULOXETIN HYDROCHLORIDE 60 MG/1
60 CAPSULE, DELAYED RELEASE ORAL DAILY
Status: DISCONTINUED | OUTPATIENT
Start: 2022-10-02 | End: 2022-10-18 | Stop reason: HOSPADM

## 2022-10-02 RX ORDER — OXYCODONE HYDROCHLORIDE AND ACETAMINOPHEN 5; 325 MG/1; MG/1
1 TABLET ORAL EVERY 6 HOURS PRN
Status: DISPENSED | OUTPATIENT
Start: 2022-10-02 | End: 2022-10-09

## 2022-10-02 RX ORDER — ACETAMINOPHEN 160 MG/5ML
650 SOLUTION ORAL EVERY 4 HOURS PRN
Status: DISCONTINUED | OUTPATIENT
Start: 2022-10-02 | End: 2022-10-13

## 2022-10-02 RX ORDER — ACETAMINOPHEN 650 MG/1
650 SUPPOSITORY RECTAL EVERY 4 HOURS PRN
Status: DISCONTINUED | OUTPATIENT
Start: 2022-10-02 | End: 2022-10-13

## 2022-10-02 RX ORDER — TAMSULOSIN HYDROCHLORIDE 0.4 MG/1
0.4 CAPSULE ORAL NIGHTLY
Status: DISCONTINUED | OUTPATIENT
Start: 2022-10-02 | End: 2022-10-18 | Stop reason: HOSPADM

## 2022-10-02 RX ORDER — IPRATROPIUM BROMIDE AND ALBUTEROL SULFATE 2.5; .5 MG/3ML; MG/3ML
3 SOLUTION RESPIRATORY (INHALATION) EVERY 4 HOURS PRN
Status: DISCONTINUED | OUTPATIENT
Start: 2022-10-02 | End: 2022-10-06

## 2022-10-02 RX ORDER — MULTIPLE VITAMINS W/ MINERALS TAB 9MG-400MCG
1 TAB ORAL DAILY
Status: DISCONTINUED | OUTPATIENT
Start: 2022-10-02 | End: 2022-10-13

## 2022-10-02 RX ORDER — ENOXAPARIN SODIUM 100 MG/ML
40 INJECTION SUBCUTANEOUS EVERY 24 HOURS
Status: DISCONTINUED | OUTPATIENT
Start: 2022-10-02 | End: 2022-10-03

## 2022-10-02 RX ORDER — MONTELUKAST SODIUM 10 MG/1
10 TABLET ORAL NIGHTLY
COMMUNITY
End: 2022-10-18 | Stop reason: HOSPADM

## 2022-10-02 RX ORDER — ASPIRIN 81 MG/1
81 TABLET, CHEWABLE ORAL DAILY
Status: DISCONTINUED | OUTPATIENT
Start: 2022-10-03 | End: 2022-10-03

## 2022-10-02 RX ORDER — VANCOMYCIN HYDROCHLORIDE 1 G/200ML
1000 INJECTION, SOLUTION INTRAVENOUS EVERY 12 HOURS
Status: DISCONTINUED | OUTPATIENT
Start: 2022-10-02 | End: 2022-10-04

## 2022-10-02 RX ORDER — ONDANSETRON 2 MG/ML
4 INJECTION INTRAMUSCULAR; INTRAVENOUS ONCE
Status: COMPLETED | OUTPATIENT
Start: 2022-10-02 | End: 2022-10-02

## 2022-10-02 RX ORDER — NITROGLYCERIN 0.4 MG/1
0.4 TABLET SUBLINGUAL
Status: DISCONTINUED | OUTPATIENT
Start: 2022-10-02 | End: 2022-10-13

## 2022-10-02 RX ORDER — SODIUM CHLORIDE 0.9 % (FLUSH) 0.9 %
10 SYRINGE (ML) INJECTION AS NEEDED
Status: DISCONTINUED | OUTPATIENT
Start: 2022-10-02 | End: 2022-10-13

## 2022-10-02 RX ORDER — ACETAMINOPHEN 325 MG/1
650 TABLET ORAL EVERY 4 HOURS PRN
Status: DISCONTINUED | OUTPATIENT
Start: 2022-10-02 | End: 2022-10-13

## 2022-10-02 RX ADMIN — Medication 10 ML: at 20:37

## 2022-10-02 RX ADMIN — Medication 10 ML: at 13:03

## 2022-10-02 RX ADMIN — VANCOMYCIN HYDROCHLORIDE 1000 MG: 1 INJECTION, SOLUTION INTRAVENOUS at 22:11

## 2022-10-02 RX ADMIN — Medication 10 ML: at 00:41

## 2022-10-02 RX ADMIN — MORPHINE SULFATE 2 MG: 2 INJECTION, SOLUTION INTRAMUSCULAR; INTRAVENOUS at 05:37

## 2022-10-02 RX ADMIN — SODIUM CHLORIDE 100 ML/HR: 9 INJECTION, SOLUTION INTRAVENOUS at 20:37

## 2022-10-02 RX ADMIN — ONDANSETRON 4 MG: 2 INJECTION INTRAMUSCULAR; INTRAVENOUS at 05:37

## 2022-10-02 RX ADMIN — Medication 10 ML: at 08:58

## 2022-10-02 RX ADMIN — MORPHINE SULFATE 2 MG: 2 INJECTION, SOLUTION INTRAMUSCULAR; INTRAVENOUS at 10:54

## 2022-10-02 RX ADMIN — DULOXETINE HYDROCHLORIDE 60 MG: 60 CAPSULE, DELAYED RELEASE ORAL at 20:37

## 2022-10-02 RX ADMIN — MONTELUKAST SODIUM 10 MG: 10 TABLET, FILM COATED ORAL at 20:37

## 2022-10-02 RX ADMIN — ENOXAPARIN SODIUM 40 MG: 100 INJECTION SUBCUTANEOUS at 20:40

## 2022-10-02 RX ADMIN — CETIRIZINE HYDROCHLORIDE 5 MG: 10 TABLET ORAL at 20:36

## 2022-10-02 RX ADMIN — TAMSULOSIN HYDROCHLORIDE 0.4 MG: 0.4 CAPSULE ORAL at 20:37

## 2022-10-02 RX ADMIN — CEFEPIME 2 G: 2 INJECTION, POWDER, FOR SOLUTION INTRAVENOUS at 13:03

## 2022-10-02 RX ADMIN — SODIUM CHLORIDE 1000 ML: 9 INJECTION, SOLUTION INTRAVENOUS at 00:36

## 2022-10-02 RX ADMIN — VANCOMYCIN HYDROCHLORIDE 1000 MG: 1 INJECTION, SOLUTION INTRAVENOUS at 10:48

## 2022-10-02 RX ADMIN — SODIUM CHLORIDE 100 ML/HR: 9 INJECTION, SOLUTION INTRAVENOUS at 15:07

## 2022-10-02 RX ADMIN — CEFEPIME 2 G: 2 INJECTION, POWDER, FOR SOLUTION INTRAVENOUS at 04:53

## 2022-10-02 RX ADMIN — SODIUM CHLORIDE 100 ML/HR: 9 INJECTION, SOLUTION INTRAVENOUS at 00:38

## 2022-10-02 RX ADMIN — Medication 10 ML: at 10:54

## 2022-10-02 NOTE — PROGRESS NOTES
[facility] Clinical Pharmacy Services: Dosing Consult    Pt Name: Boni Vega   : 1940  Weight: 83.9 kg (185 lb)  Antibiotic: Cefepime dosing  Indication: Intra-Abdominal Infection and Sepsis    Relevant clinical data and objective history reviewed:    No past medical history on file.  Creatinine   Date Value Ref Range Status   10/01/2022 0.63 (L) 0.76 - 1.27 mg/dL Final   2020 1.1 0.7 - 1.5 mg/dL Final   2019 0.7 0.7 - 1.5 mg/dL Final   2018 0.7 0.7 - 1.5 mg/dL Final     BUN   Date Value Ref Range Status   10/01/2022 14 8 - 23 mg/dL Final   2020 17 7 - 20 mg/dL Final     Estimated Creatinine Clearance: 105.4 mL/min (A) (by C-G formula based on SCr of 0.63 mg/dL (L)).    Lab Results   Component Value Date    WBC 20.61 (H) 10/01/2022     Temp Readings from Last 3 Encounters:   10/01/22 98.9 °F (37.2 °C)      Assessment/Plan    Ordered cefepime 2 g iv every 8 hours for a total of 5 days. Will monitor and adjust if culture data or pertinent lab values indicate this is best for the patient.     Thank you for this consult and please contact pharmacy with any questions or concerns.     Elvin Marvin III, [facility]  Clinical Pharmacist

## 2022-10-02 NOTE — H&P
Patient Name:  Boni Vega  YOB: 1940  MRN:  [id number]  Admit Date:  10/1/2022  Patient Care Team:  System, Provider Not In as PCP - General (Internal Medicine)      Subjective   History Present Illness     Chief Complaint   Patient presents with   • Abdominal Pain   • Vomiting         History of Present Illness   History assisted by daughter.      Patient is a 81-year-old male with a history of essential tremor status post deep brain stimulator, CVA complicated with history of aspiration, COPD, essential hypertension, degenerative disc disease status post back surgery, GERD, presented to the ED complains of abdominal pain for 2 days.  Associated with nausea and vomiting.  Abdominal pain primarily located to close to lower abdomen.  Denies fevers, chills, nausea, vomiting.  No alleviating or aggravating factors.  Denies recent injury.        Review of Systems   GENERAL: No change in appetite or weight;   No fevers, chills, sweats.    SKIN: No nonhealing lesions.   No rashes.  HEME/LYMPH: No easy bruising, bleeding.   No swollen nodes.   EYES: No vision changes or diplopia.   ENT: No tinnitus, hearing loss, gum bleeding, epistaxis, hoarseness or dysphagia.   RESPIRATORY: No cough, shortness of breath, hemoptysis or wheezing.   CVS: No chest pain, palpitations, orthopnea, dyspnea on exertion or PND.   GI: No melena or hematochezia.   + abdominal pain.  + nausea, +vomiting, constipation, diarrhea  : No lower tract obstructive symptoms, dysuria or hematuria.   MUSCULOSKELETAL: No bone pain.  No joint stiffness.   NEUROLOGICAL: No global weakness, loss of consciousness or seizures.   PSYCHIATRIC: No increased nervousness, mood changes or depression.       Personal History     Past Medical History:   Diagnosis Date   • COPD (chronic obstructive pulmonary disease) (HCC)    • Pacemaker    • Stroke (HCC)      History reviewed. No pertinent surgical history.  History reviewed. No pertinent family  history.  Social History     Tobacco Use   • Smoking status: Former Smoker     Types: Cigarettes     Quit date: 10/1/2021     Years since quittin.0   • Smokeless tobacco: Never Used   Substance Use Topics   • Alcohol use: Never   • Drug use: Never     No current facility-administered medications on file prior to encounter.     No current outpatient medications on file prior to encounter.     No Known Allergies    Objective    Objective     Vital Signs  Temp:  [97.5 °F (36.4 °C)-98 °F (36.7 °C)] 97.9 °F (36.6 °C)  Heart Rate:  [70-92] 84  Resp:  [16-18] 16  BP: ()/(69-86) 120/76  SpO2:  [89 %-96 %] 94 %  on  Flow (L/min):  [2] 2;   Device (Oxygen Therapy): nasal cannula  Body mass index is 24.28 kg/m².    Physical Exam  General: Alert and oriented x2, no acute distress, ill-appearing, soft-spoken, difficult to understand, discussed with daughter, his speech pis at baseline per daughter, , also patient does not have dentures which makes it more difficult to understand him.  HEENT: Normocephalic, atraumatic  Eyes: PERRL, EOMI,   Lungs: Decreased at bases, no wheezing, nonlabored breathing, on 2 L nasal cannula  CV: RRR, no murmur  Abdomen: Soft, nontender, nondistended.  Normoactive bowel sounds  Extremities: No significant peripheral edema, no cyanosis,   Skin: Clean/dry/intact, no rashes  Neuro: Cranial nerves II through XII intact, no gross focal neurological deficits appreciated generalized weakness, no tremors observed  Psych: Appropriate mood and affect      Results Review:  I reviewed the patient's new clinical results.  I reviewed the patient's new imaging results and agree with the interpretation.  I reviewed the patient's other test results and agree with the interpretation  I personally viewed and interpreted the patient's EKG/Telemetry data  Discussed with ED provider.    Lab Results (last 24 hours)     Procedure Component Value Units Date/Time    CBC & Differential [[id number]]  (Abnormal)  Collected: 10/01/22 1939    Specimen: Blood Updated: 10/01/22 1949    Narrative:      The following orders were created for panel order CBC & Differential.  Procedure                               Abnormality         Status                     ---------                               -----------         ------                     CBC Auto Differential[[id number]]        Abnormal            Final result                 Please view results for these tests on the individual orders.    Comprehensive Metabolic Panel [[id number]]  (Abnormal) Collected: 10/01/22 1939    Specimen: Blood Updated: 10/01/22 2004     Glucose 167 mg/dL      BUN 14 mg/dL      Creatinine 0.63 mg/dL      Sodium 138 mmol/L      Potassium 3.7 mmol/L      Comment: Slight hemolysis detected by analyzer. Results may be affected.        Chloride 102 mmol/L      CO2 26.0 mmol/L      Calcium 9.0 mg/dL      Total Protein 6.2 g/dL      Albumin 3.50 g/dL      ALT (SGPT) 93 U/L      AST (SGOT) 118 U/L      Alkaline Phosphatase 77 U/L      Total Bilirubin 0.6 mg/dL      Globulin 2.7 gm/dL      A/G Ratio 1.3 g/dL      BUN/Creatinine Ratio 22.2     Anion Gap 10.0 mmol/L      eGFR 94.4 mL/min/1.73      Comment: National Kidney Foundation and American Society of Nephrology (ASN) Task Force recommended calculation based on the Chronic Kidney Disease Epidemiology Collaboration (CKD-EPI) equation refit without adjustment for race.       Narrative:      GFR Normal >60  Chronic Kidney Disease <60  Kidney Failure <15      Lipase [[id number]]  (Normal) Collected: 10/01/22 1939    Specimen: Blood Updated: 10/01/22 2004     Lipase 13 U/L     CBC Auto Differential [[id number]]  (Abnormal) Collected: 10/01/22 1939    Specimen: Blood Updated: 10/01/22 1949     WBC 20.61 10*3/mm3      RBC 5.02 10*6/mm3      Hemoglobin 15.8 g/dL      Hematocrit 45.4 %      MCV 90.4 fL      MCH 31.5 pg      MCHC 34.8 g/dL      RDW 12.0 %      RDW-SD 40.3 fl      MPV 10.2 fL      Platelets 216 10*3/mm3  "     Neutrophil % 78.0 %      Lymphocyte % 15.1 %      Monocyte % 5.6 %      Eosinophil % 0.4 %      Basophil % 0.4 %      Immature Grans % 0.5 %      Neutrophils, Absolute 16.07 10*3/mm3      Lymphocytes, Absolute 3.11 10*3/mm3      Monocytes, Absolute 1.15 10*3/mm3      Eosinophils, Absolute 0.09 10*3/mm3      Basophils, Absolute 0.09 10*3/mm3      Immature Grans, Absolute 0.10 10*3/mm3      nRBC 0.0 /100 WBC     Procalcitonin [[id number]]  (Normal) Collected: 10/01/22 1939    Specimen: Blood Updated: 10/01/22 2050     Procalcitonin 0.09 ng/mL     Narrative:      As a Marker for Sepsis (Non-Neonates):    1. <0.5 ng/mL represents a low risk of severe sepsis and/or septic shock.  2. >2 ng/mL represents a high risk of severe sepsis and/or septic shock.    As a Marker for Lower Respiratory Tract Infections that require antibiotic therapy:    PCT on Admission    Antibiotic Therapy       6-12 Hrs later    >0.5                Strongly Recommended  >0.25 - <0.5        Recommended   0.1 - 0.25          Discouraged              Remeasure/reassess PCT  <0.1                Strongly Discouraged     Remeasure/reassess PCT    As 28 day mortality risk marker: \"Change in Procalcitonin Result\" (>80% or <=80%) if Day 0 (or Day 1) and Day 4 values are available. Refer to [email or web address]-[email or web address]    Change in PCT <=80%  A decrease of PCT levels below or equal to 80% defines a positive change in PCT test result representing a higher risk for 28-day all-cause mortality of patients diagnosed with severe sepsis for septic shock.    Change in PCT >80%  A decrease of PCT levels of more than 80% defines a negative change in PCT result representing a lower risk for 28-day all-cause mortality of patients diagnosed with severe sepsis or septic shock.       BNP [[id number]]  (Normal) Collected: 10/01/22 1939    Specimen: Blood Updated: 10/01/22 2050     proBNP 206.0 pg/mL     Narrative:      Among patients with dyspnea, NT-proBNP is " highly sensitive for the detection of acute congestive heart failure. In addition NT-proBNP of <300 pg/ml effectively rules out acute congestive heart failure with 99% negative predictive value.    Results may be falsely decreased if patient taking Biotin.      Troponin [[id number]]  (Normal) Collected: 10/01/22 1939    Specimen: Blood Updated: 10/01/22 2050     Troponin T <0.010 ng/mL     Narrative:      Troponin T Reference Range:  <= 0.03 ng/mL-   Negative for AMI  >0.03 ng/mL-     Abnormal for myocardial necrosis.  Clinicians would have to utilize clinical acumen, EKG, Troponin and serial changes to determine if it is an Acute Myocardial Infarction or myocardial injury due to an underlying chronic condition.       Results may be falsely decreased if patient taking Biotin.      Lactic Acid, Plasma [[id number]]  (Abnormal) Collected: 10/01/22 1956    Specimen: Blood Updated: 10/01/22 2033     Lactate 2.5 mmol/L     Blood Culture - Blood, Arm, Right [[id number]] Collected: 10/01/22 2024    Specimen: Blood from Arm, Right Updated: 10/01/22 2044    Blood Culture - Blood, Arm, Left [[id number]] Collected: 10/01/22 2024    Specimen: Blood from Arm, Left Updated: 10/01/22 2044    Urinalysis With Microscopic If Indicated (No Culture) - Urine, Clean Catch [[id number]]  (Normal) Collected: 10/01/22 2034    Specimen: Urine, Clean Catch Updated: 10/01/22 2100     Color, UA Yellow     Appearance, UA Clear     pH, UA 7.0     Specific Gravity, UA 1.016     Glucose, UA Negative     Ketones, UA Negative     Bilirubin, UA Negative     Blood, UA Negative     Protein, UA Negative     Leuk Esterase, UA Negative     Nitrite, UA Negative     Urobilinogen, UA 1.0 E.U./dL    Narrative:      Urine microscopic not indicated.    STAT Lactic Acid, Reflex [[id number]]  (Abnormal) Collected: 10/01/22 2352    Specimen: Blood Updated: 10/02/22 0030     Lactate 3.4 mmol/L     STAT Lactic Acid, Reflex [[id number]]  (Abnormal) Collected: 10/02/22 0538     Specimen: Blood Updated: 10/02/22 0557     Lactate 2.4 mmol/L     Basic Metabolic Panel [[id number]]  (Abnormal) Collected: 10/02/22 0525    Specimen: Blood Updated: 10/02/22 0557     Glucose 144 mg/dL      BUN 13 mg/dL      Creatinine 0.62 mg/dL      Sodium 139 mmol/L      Potassium 3.8 mmol/L      Comment: Slight hemolysis detected by analyzer. Results may be affected.        Chloride 106 mmol/L      CO2 23.2 mmol/L      Calcium 7.7 mg/dL      BUN/Creatinine Ratio 21.0     Anion Gap 9.8 mmol/L      eGFR 94.8 mL/min/1.73      Comment: National Kidney Foundation and American Society of Nephrology (ASN) Task Force recommended calculation based on the Chronic Kidney Disease Epidemiology Collaboration (CKD-EPI) equation refit without adjustment for race.       Narrative:      GFR Normal >60  Chronic Kidney Disease <60  Kidney Failure <15      CBC (No Diff) [[id number]]  (Abnormal) Collected: 10/02/22 0525    Specimen: Blood Updated: 10/02/22 0552     WBC 17.59 10*3/mm3      RBC 3.95 10*6/mm3      Hemoglobin 12.4 g/dL      Hematocrit 36.2 %      MCV 91.6 fL      MCH 31.4 pg      MCHC 34.3 g/dL      RDW 12.0 %      RDW-SD 40.8 fl      MPV 10.4 fL      Platelets 177 10*3/mm3     STAT Lactic Acid, Reflex [[id number]]  (Abnormal) Collected: 10/02/22 0857    Specimen: Blood Updated: 10/02/22 0930     Lactate 2.4 mmol/L     STAT Lactic Acid, Reflex [[id number]]  (Abnormal) Collected: 10/02/22 1433    Specimen: Blood Updated: 10/02/22 1501     Lactate 2.2 mmol/L     STAT Lactic Acid, Reflex [[id number]]  (Normal) Collected: 10/02/22 1614    Specimen: Blood Updated: 10/02/22 1642     Lactate 2.0 mmol/L           Imaging Results (Last 24 Hours)     Procedure Component Value Units Date/Time    XR Chest 1 View [[id number]] Collected: 10/01/22 2259     Updated: 10/01/22 2303    Narrative:      SINGLE VIEW OF THE CHEST     HISTORY: Shortness of breath and hypoxia     COMPARISON: None available.     FINDINGS:  Cardiomegaly is present.  There is no vascular congestion. Background  emphysematous changes are seen. There is some nonspecific bibasilar  consolidation, left greater than right. No obvious pneumothorax or  pleural effusion is seen. Left-sided generator is seen overlying the  left chest.       Impression:      Nonspecific bibasilar consolidation.     This report was finalized on 10/1/2022 11:00 PM by Dr. Soheila Mann M.D.       CT Abdomen Pelvis With Contrast [[id number]] Collected: 10/01/22 2222     Updated: 10/01/22 2245    Narrative:      CT ANGIOGRAM OF THE CHEST; CT OF THE ABDOMEN AND PELVIS WITH CONTRAST     HISTORY: Hypoxia and tachypnea. Abdominal pain.     COMPARISON: 01/23/2007     TECHNIQUE: Axial CT imaging was obtained through the thorax. IV contrast  was administered. Three-D reformatted images were obtained. Axial CT  imaging was obtained through the abdomen and pelvis. IV contrast was  administered.     FINDINGS:  CT ANGIOGRAM OF THE CHEST: There is dilatation of the ascending thoracic  aorta, measuring up to 4 cm. Proximal descending thoracic aorta is  dilated, measuring 3.3 cm. Distal descending thoracic aorta tapers to  normal caliber. No acute pulmonary thromboembolus is seen. The thyroid  gland, trachea, and esophagus appear unremarkable. There are coronary  artery calcifications. There are advanced background emphysematous  changes. Dependent consolidation is noted bilaterally. Some of this may  reflect atelectasis, but pneumonia may be present as well, particularly  within the left lower lobe. The patient has a nodule within the left  upper lobe, which measures up to 1.5 cm. Malignancy cannot be excluded.  There is also an enlarged left hilar node, which measures up to 2.3 x  1.5 cm. Patient does appear to have some debris within the bronchi to  the right lower lobe. Thyroid gland and trachea are unremarkable.  Left-sided generator is noted overlying the left chest wall. There are  changes of prior vertebroplasty  at L1.     CT OF THE ABDOMEN AND PELVIS: Images are severely compromised by motion  and streak artifact. Heterogeneous lesions are seen within the right  lobe of the liver, difficult to fully assess, given the presence of the  streak artifact from the patient's arms. However, the larger measures up  to 7.9 x 5.4 cm. Malignancy cannot be excluded. 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 Patient does have intraluminal mural thrombus. Prostate gland  contains dystrophic calcifications. Urinary bladder contains excreted  contrast material. There is colonic diverticulosis. Proximal sigmoid  colon does appear thick walled, with some adjacent pericolonic soft  tissue stranding. Diverticulitis is not excluded. There is no  extraluminal gas or organized fluid collection suggest perforation with  abscess formation. Patient does have some free fluid within the  paracolic gutters. The patient is osteoporotic. Intramedullary ritesh is  identified within the proximal left femur. Calcified granulomata are  seen within the spleen. Pancreas is atrophic. Gallbladder is absent.  Kidneys enhance symmetrically. There is no hydronephrosis.       Impression:         1. Bibasilar consolidation may reflect atelectasis, although correlation  with any evidence of pneumonia is recommended.  2. Left upper lobe pulmonary nodule, as well as enlarged left hilar  lymph node. Malignancy cannot be excluded.  3. Apparent heterogeneous lesions within the right lobe of the liver.  Neoplasm versus abscess would be considerations. Full assessment of the  liver lesions is severely limited due to streak artifact and motion  artifact. Liver protocol CT or MRI could be considered for further  assessment.  4. Diverticulosis, as well as a thick walled segment of sigmoid colon.  This may reflect diverticulitis, although follow-up colonoscopy is  suggested to exclude any underlying neoplasm.  5. Patient does  have some free fluid tracking along the paracolic  gutters bilaterally. There may be some additional free fluid around the  liver and spleen.     Radiation dose reduction techniques were utilized, including automated  exposure control and exposure modulation based on body size.     This report was finalized on 10/1/2022 10:42 PM by Dr. Soheila Mann M.D.       CT Angiogram Chest [[id number]] Collected: 10/01/22 2222     Updated: 10/01/22 2245    Narrative:      CT ANGIOGRAM OF THE CHEST; CT OF THE ABDOMEN AND PELVIS WITH CONTRAST     HISTORY: Hypoxia and tachypnea. Abdominal pain.     COMPARISON: 01/23/2007     TECHNIQUE: Axial CT imaging was obtained through the thorax. IV contrast  was administered. Three-D reformatted images were obtained. Axial CT  imaging was obtained through the abdomen and pelvis. IV contrast was  administered.     FINDINGS:  CT ANGIOGRAM OF THE CHEST: There is dilatation of the ascending thoracic  aorta, measuring up to 4 cm. Proximal descending thoracic aorta is  dilated, measuring 3.3 cm. Distal descending thoracic aorta tapers to  normal caliber. No acute pulmonary thromboembolus is seen. The thyroid  gland, trachea, and esophagus appear unremarkable. There are coronary  artery calcifications. There are advanced background emphysematous  changes. Dependent consolidation is noted bilaterally. Some of this may  reflect atelectasis, but pneumonia may be present as well, particularly  within the left lower lobe. The patient has a nodule within the left  upper lobe, which measures up to 1.5 cm. Malignancy cannot be excluded.  There is also an enlarged left hilar node, which measures up to 2.3 x  1.5 cm. Patient does appear to have some debris within the bronchi to  the right lower lobe. Thyroid gland and trachea are unremarkable.  Left-sided generator is noted overlying the left chest wall. There are  changes of prior vertebroplasty at L1.     CT OF THE ABDOMEN AND PELVIS: Images are  severely compromised by motion  and streak artifact. Heterogeneous lesions are seen within the right  lobe of the liver, difficult to fully assess, given the presence of the  streak artifact from the patient's arms. However, the larger measures up  to 7.9 x 5.4 cm. Malignancy cannot be excluded. 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 Patient does have intraluminal mural thrombus. Prostate gland  contains dystrophic calcifications. Urinary bladder contains excreted  contrast material. There is colonic diverticulosis. Proximal sigmoid  colon does appear thick walled, with some adjacent pericolonic soft  tissue stranding. Diverticulitis is not excluded. There is no  extraluminal gas or organized fluid collection suggest perforation with  abscess formation. Patient does have some free fluid within the  paracolic gutters. The patient is osteoporotic. Intramedullary ritesh is  identified within the proximal left femur. Calcified granulomata are  seen within the spleen. Pancreas is atrophic. Gallbladder is absent.  Kidneys enhance symmetrically. There is no hydronephrosis.       Impression:         1. Bibasilar consolidation may reflect atelectasis, although correlation  with any evidence of pneumonia is recommended.  2. Left upper lobe pulmonary nodule, as well as enlarged left hilar  lymph node. Malignancy cannot be excluded.  3. Apparent heterogeneous lesions within the right lobe of the liver.  Neoplasm versus abscess would be considerations. Full assessment of the  liver lesions is severely limited due to streak artifact and motion  artifact. Liver protocol CT or MRI could be considered for further  assessment.  4. Diverticulosis, as well as a thick walled segment of sigmoid colon.  This may reflect diverticulitis, although follow-up colonoscopy is  suggested to exclude any underlying neoplasm.  5. Patient does have some free fluid tracking along the  paracolic  gutters bilaterally. There may be some additional free fluid around the  liver and spleen.     Radiation dose reduction techniques were utilized, including automated  exposure control and exposure modulation based on body size.     This report was finalized on 10/1/2022 10:42 PM by Dr. Soheila Mann M.D.                 ECG 12 Lead   Final Result   HEART RATE= 94  bpm   RR Interval= 638  ms   WA Interval= 237  ms   P Horizontal Axis= 2  deg   P Front Axis= 28  deg   QRSD Interval= 94  ms   QT Interval= 361  ms   QRS Axis= -1  deg   T Wave Axis= 52  deg   - ABNORMAL ECG -   Sinus rhythm   Prolonged WA interval   Low voltage, precordial leads   Artifact in lead(s) I, II, aVR, aVL   Electronically Signed By: Kam Scott ([facility]) 01-Oct-2022 21:41:23   Date and Time of Study: 2022-10-01 20:42:55           Assessment/Plan     Active Hospital Problems    Diagnosis  POA   • **Sigmoid diverticulitis [K57.32]  Unknown   • COPD (chronic obstructive pulmonary disease) (HCC) [J44.9]  Unknown   • Benign essential tremor [G25.0]  Unknown   • 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     Patient is a 81-year-old male with a history of essential tremor status post deep brain stimulator, CVA complicated with history of aspiration, COPD, essential hypertension, degenerative disc disease status post back surgery, GERD, presented to the ED complains of abdominal pain for 2 days.  Diagnosed with sigmoid diverticulitis and liver lesion..      Sigmoid diverticulitis: WBC was 20.61 on admission.  Lactic acid elevated at 2.5.  Afebrile.  Initiated on vancomycin and cefepime.  Discontinued cefepime,  started on Zosyn for more anaerobic coverage., continue vancomycin.  Blood cultures pending.  WBC improving, monitor daily CBC.    Liver lesion: CT findings large lesion in the liver, mass versus abscess.  Triple phase CT pending for further evaluation of the lesion.  Depending on the results patient may need biopsy versus drain.  Continue antibiotics as above.  GI and General surgery following, appreciate recs.      Mild hypoxemia/COPD.  Patient's O2 saturation was 90 and 92% on admission.  No wheezing on exam.  Discussed with daughter, his COPD is mild, he was prescribed as needed breathing treatment which he has not been using.  Not on scheduled inhalers at home.  CT angiogram of the chest showed bibasilar consolidation may reflect atelectasis versus pneumonia.  On antibiotics as above.  N.p.o. currently, speech consulted for evaluation of possible aspiration especially in the setting of history of aspiration pneumonia.  Antibiotics as above.  Incentive parameter:.  As needed albuterol.  Wean off O2 as tolerated.    History of CVA complicated by aspiration: N.p.o., speech therapy to evaluate.      Left upper lobe pulmonary nodule, as well as enlarged left hilar lymph node.  CT chest showed The patient has a nodule within the left upper lobe, which measures up to 1.5 cm. There is also an enlarged left hilar node, which measures up to 2.3 x1.5 cm Malignancy cannot be excluded.  Pulmonology consult.    Essential hypertension: blood pressure stable off BP meds, monitor.    Essential tremor status post deep brain stimulator: No tremors observed during the exam, monitor.    Chronic neck pain/degenerative disc disease status post low back surgery: Per daughter received steroid injections to the neck, pain much improved afterwards.  Continue as needed pain medication.     Daughter to provide list of patient's home medications, will reconcile    · I discussed the patient's findings and my recommendations with patient,  daughter, RN     CODE STATUS: Full code    VTE Prophylaxis - lovenox  Code Status - Full code.       Adrianne Gomez MD  [facility]  10/02/22  19:21 EDT

## 2022-10-02 NOTE — ED NOTES
Nursing report ED to floor  Boni Vega  81 y.o.  male    HPI :   Chief Complaint   Patient presents with   • Abdominal Pain   • Vomiting       Admitting doctor:   Adrianne Gomez MD    Admitting diagnosis:   The primary encounter diagnosis was Diverticulitis. Diagnoses of Sepsis, due to unspecified organism, unspecified whether acute organ dysfunction present (HCC), Abnormal CT of the chest, and Abnormal CT of the abdomen were also pertinent to this visit.    Code status:   Current Code Status     Date Active Code Status Order ID Comments User Context       10/2/2022 0025 CPR (Attempt to Resuscitate) [id number]  Melanie Pickard APRN ED     Advance Care Planning Activity      Questions for Current Code Status     Question Answer    Code Status (Patient has no pulse and is not breathing) CPR (Attempt to Resuscitate)    Medical Interventions (Patient has pulse or is breathing) Full Support          Allergies:   Patient has no known allergies.    Isolation:   No active isolations    Intake and Output    Intake/Output Summary (Last 24 hours) at 10/2/2022 1059  Last data filed at 10/2/2022 0143  Gross per 24 hour   Intake 3100 ml   Output --   Net 3100 ml       Weight:       10/01/22  2138   Weight: 83.9 kg (185 lb)       Most recent vitals:   Vitals:    10/02/22 0501 10/02/22 0701 10/02/22 0800 10/02/22 1044   BP: 105/77 97/70 103/75 117/73   BP Location:   Right arm    Patient Position:   Lying    Pulse: 86  74 77   Resp:   16    Temp:   98 °F (36.7 °C)    TempSrc:   Oral    SpO2: 91%  (!) 89% 94%   Weight:       Height:           Active LDAs/IV Access:   Lines, Drains & Airways     Active LDAs     Name Placement date Placement time Site Days    Peripheral IV 10/01/22 1935 Left Forearm 10/01/22  1935  Forearm  less than 1    Peripheral IV 10/01/22 2025 Right Antecubital 10/01/22  2025  Antecubital  less than 1                Labs (abnormal labs have a star):   Labs Reviewed   COMPREHENSIVE METABOLIC PANEL -  Abnormal; Notable for the following components:       Result Value    Glucose 167 (*)     Creatinine 0.63 (*)     ALT (SGPT) 93 (*)     AST (SGOT) 118 (*)     All other components within normal limits    Narrative:     GFR Normal >60  Chronic Kidney Disease <60  Kidney Failure <15     LACTIC ACID, PLASMA - Abnormal; Notable for the following components:    Lactate 2.5 (*)     All other components within normal limits   CBC WITH AUTO DIFFERENTIAL - Abnormal; Notable for the following components:    WBC 20.61 (*)     RDW 12.0 (*)     Neutrophil % 78.0 (*)     Lymphocyte % 15.1 (*)     Neutrophils, Absolute 16.07 (*)     Lymphocytes, Absolute 3.11 (*)     Monocytes, Absolute 1.15 (*)     Immature Grans, Absolute 0.10 (*)     All other components within normal limits   LACTIC ACID, REFLEX - Abnormal; Notable for the following components:    Lactate 3.4 (*)     All other components within normal limits   LACTIC ACID, REFLEX - Abnormal; Notable for the following components:    Lactate 2.4 (*)     All other components within normal limits   BASIC METABOLIC PANEL - Abnormal; Notable for the following components:    Glucose 144 (*)     Creatinine 0.62 (*)     Calcium 7.7 (*)     All other components within normal limits    Narrative:     GFR Normal >60  Chronic Kidney Disease <60  Kidney Failure <15     CBC (NO DIFF) - Abnormal; Notable for the following components:    WBC 17.59 (*)     RBC 3.95 (*)     Hemoglobin 12.4 (*)     Hematocrit 36.2 (*)     RDW 12.0 (*)     All other components within normal limits   LACTIC ACID, REFLEX - Abnormal; Notable for the following components:    Lactate 2.4 (*)     All other components within normal limits   LIPASE - Normal   URINALYSIS W/ MICROSCOPIC IF INDICATED (NO CULTURE) - Normal    Narrative:     Urine microscopic not indicated.   PROCALCITONIN - Normal    Narrative:     As a Marker for Sepsis (Non-Neonates):    1. <0.5 ng/mL represents a low risk of severe sepsis and/or septic  "shock.  2. >2 ng/mL represents a high risk of severe sepsis and/or septic shock.    As a Marker for Lower Respiratory Tract Infections that require antibiotic therapy:    PCT on Admission    Antibiotic Therapy       6-12 Hrs later    >0.5                Strongly Recommended  >0.25 - <0.5        Recommended   0.1 - 0.25          Discouraged              Remeasure/reassess PCT  <0.1                Strongly Discouraged     Remeasure/reassess PCT    As 28 day mortality risk marker: \"Change in Procalcitonin Result\" (>80% or <=80%) if Day 0 (or Day 1) and Day 4 values are available. Refer to http://www.[employer]-pct-calculator.com    Change in PCT <=80%  A decrease of PCT levels below or equal to 80% defines a positive change in PCT test result representing a higher risk for 28-day all-cause mortality of patients diagnosed with severe sepsis for septic shock.    Change in PCT >80%  A decrease of PCT levels of more than 80% defines a negative change in PCT result representing a lower risk for 28-day all-cause mortality of patients diagnosed with severe sepsis or septic shock.      BNP (IN-HOUSE) - Normal    Narrative:     Among patients with dyspnea, NT-proBNP is highly sensitive for the detection of acute congestive heart failure. In addition NT-proBNP of <300 pg/ml effectively rules out acute congestive heart failure with 99% negative predictive value.    Results may be falsely decreased if patient taking Biotin.     TROPONIN (IN-HOUSE) - Normal    Narrative:     Troponin T Reference Range:  <= 0.03 ng/mL-   Negative for AMI  >0.03 ng/mL-     Abnormal for myocardial necrosis.  Clinicians would have to utilize clinical acumen, EKG, Troponin and serial changes to determine if it is an Acute Myocardial Infarction or myocardial injury due to an underlying chronic condition.       Results may be falsely decreased if patient taking Biotin.     BLOOD CULTURE   BLOOD CULTURE   RAINBOW DRAW    Narrative:     The following orders " were created for panel order [place] Draw.  Procedure                               Abnormality         Status                     ---------                               -----------         ------                     Green Top (Gel)[[id number]]                                  Final result               Lavender Top[[id number]]                                     Final result               Gold Top - SST[[id number]]                                   Final result               Light Blue Top[[id number]]                                   Final result                 Please view results for these tests on the individual orders.   LACTIC ACID, REFLEX   CBC AND DIFFERENTIAL    Narrative:     The following orders were created for panel order CBC & Differential.  Procedure                               Abnormality         Status                     ---------                               -----------         ------                     CBC Auto Differential[[id number]]        Abnormal            Final result                 Please view results for these tests on the individual orders.   GREEN TOP   LAVENDER TOP   GOLD TOP - SST   LIGHT BLUE TOP       EKG:   ECG 12 Lead   Final Result   HEART RATE= 94  bpm   RR Interval= 638  ms   MN Interval= 237  ms   P Horizontal Axis= 2  deg   P Front Axis= 28  deg   QRSD Interval= 94  ms   QT Interval= 361  ms   QRS Axis= -1  deg   T Wave Axis= 52  deg   - ABNORMAL ECG -   Sinus rhythm   Prolonged MN interval   Low voltage, precordial leads   Artifact in lead(s) I, II, aVR, aVL   Electronically Signed By: Kam Scott ([facility]) 01-Oct-2022 21:41:23   Date and Time of Study: 2022-10-01 20:42:55          Meds given in ED:   Medications   sodium chloride 0.9 % flush 10 mL (has no administration in time range)   sodium chloride 0.9 % flush 10 mL (10 mL Intravenous Given 10/2/22 0041)   sodium chloride 0.9 % flush 10 mL (10 mL Intravenous Given 10/2/22 1054)   nitroglycerin (NITROSTAT) SL tablet 0.4 mg  (has no administration in time range)   acetaminophen (TYLENOL) tablet 650 mg (has no administration in time range)     Or   acetaminophen (TYLENOL) 160 MG/5ML solution 650 mg (has no administration in time range)     Or   acetaminophen (TYLENOL) suppository 650 mg (has no administration in time range)   ondansetron (ZOFRAN) tablet 4 mg (has no administration in time range)     Or   ondansetron (ZOFRAN) injection 4 mg (has no administration in time range)   calcium carbonate (TUMS) chewable tablet 500 mg (200 mg elemental) (has no administration in time range)   sodium chloride 0.9 % infusion (100 mL/hr Intravenous Currently Infusing 10/2/22 0712)   Pharmacy to dose vancomycin (has no administration in time range)   Pharmacy Consult - Pharmacy to dose (has no administration in time range)   morphine injection 2 mg (2 mg Intravenous Given 10/2/22 1054)   ipratropium-albuterol (DUO-NEB) nebulizer solution 3 mL (has no administration in time range)   ipratropium-albuterol (DUO-NEB) nebulizer solution 3 mL (has no administration in time range)   cefepime 2 gm IVPB in 100 ml NS (VTB) (0 g Intravenous Stopped 10/2/22 0855)   vancomycin (VANCOCIN) 1000 mg/200 mL dextrose 5% IVPB (1,000 mg Intravenous New Bag 10/2/22 1048)   sodium chloride 0.9 % bolus 500 mL (0 mL Intravenous Stopped 10/1/22 2128)   cefepime 2 gm IVPB in 100 ml NS (VTB) (0 g Intravenous Stopped 10/1/22 2128)   vancomycin 1750 mg/500 mL 0.9% NS IVPB ([facility]) (0 mg/kg × 83.9 kg Intravenous Stopped 10/2/22 0019)   iopamidol (ISOVUE-370) 76 % injection 100 mL (100 mL Intravenous Given 10/1/22 2113)   sodium chloride 0.9 % bolus 1,000 mL (0 mL Intravenous Stopped 10/1/22 2306)   morphine injection 2 mg (2 mg Intravenous Given 10/1/22 2216)   ondansetron (ZOFRAN) injection 4 mg (4 mg Intramuscular Given 10/1/22 2216)   sodium chloride 0.9 % bolus 2,517 mL (0 mL/kg × 83.9 kg Intravenous Stopped 10/2/22 0143)   morphine injection 2 mg (2 mg Intravenous Given 10/2/22  0537)   ondansetron (ZOFRAN) injection 4 mg (4 mg Intravenous Given 10/2/22 0537)       Imaging results:  CT Abdomen Pelvis With Contrast    Result Date: 10/1/2022   1. Bibasilar consolidation may reflect atelectasis, although correlation with any evidence of pneumonia is recommended. 2. Left upper lobe pulmonary nodule, as well as enlarged left hilar lymph node. Malignancy cannot be excluded. 3. Apparent heterogeneous lesions within the right lobe of the liver. Neoplasm versus abscess would be considerations. Full assessment of the liver lesions is severely limited due to streak artifact and motion artifact. Liver protocol CT or MRI could be considered for further assessment. 4. Diverticulosis, as well as a thick walled segment of sigmoid colon. This may reflect diverticulitis, although follow-up colonoscopy is suggested to exclude any underlying neoplasm. 5. Patient does have some free fluid tracking along the paracolic gutters bilaterally. There may be some additional free fluid around the liver and spleen.  Radiation dose reduction techniques were utilized, including automated exposure control and exposure modulation based on body size.  This report was finalized on 10/1/2022 10:42 PM by Dr. Soheila Mann M.D.      XR Chest 1 View    Result Date: 10/1/2022  Nonspecific bibasilar consolidation.  This report was finalized on 10/1/2022 11:00 PM by Dr. Soheila Mann M.D.      CT Angiogram Chest    Result Date: 10/1/2022   1. Bibasilar consolidation may reflect atelectasis, although correlation with any evidence of pneumonia is recommended. 2. Left upper lobe pulmonary nodule, as well as enlarged left hilar lymph node. Malignancy cannot be excluded. 3. Apparent heterogeneous lesions within the right lobe of the liver. Neoplasm versus abscess would be considerations. Full assessment of the liver lesions is severely limited due to streak artifact and motion artifact. Liver protocol CT or MRI could be considered  for further assessment. 4. Diverticulosis, as well as a thick walled segment of sigmoid colon. This may reflect diverticulitis, although follow-up colonoscopy is suggested to exclude any underlying neoplasm. 5. Patient does have some free fluid tracking along the paracolic gutters bilaterally. There may be some additional free fluid around the liver and spleen.  Radiation dose reduction techniques were utilized, including automated exposure control and exposure modulation based on body size.  This report was finalized on 10/1/2022 10:42 PM by Dr. Soheila Mann M.D.        Ambulatory status:    Bed Rest      Social issues:   Social History     Socioeconomic History   • Marital status: Single       NIH Stroke Scale:         Brittany Robin RN  10/02/22 10:59 EDT

## 2022-10-02 NOTE — ED NOTES
Nursing report ED to floor  Boni Vega  81 y.o.  male       I am hanging the Vancomycin now.   HPI :   Chief Complaint   Patient presents with   • Abdominal Pain   • Vomiting       Admitting doctor:   Adrianne Gomez MD    Admitting diagnosis:   The primary encounter diagnosis was Diverticulitis. Diagnoses of Sepsis, due to unspecified organism, unspecified whether acute organ dysfunction present (HCC), Abnormal CT of the chest, and Abnormal CT of the abdomen were also pertinent to this visit.    Code status:   Current Code Status     Date Active Code Status Order ID Comments User Context       10/2/2022 0025 CPR (Attempt to Resuscitate) [id number]  Melanie Pickard APRN ED     Advance Care Planning Activity      Questions for Current Code Status     Question Answer    Code Status (Patient has no pulse and is not breathing) CPR (Attempt to Resuscitate)    Medical Interventions (Patient has pulse or is breathing) Full Support          Allergies:   Patient has no known allergies.    Isolation:   No active isolations    Intake and Output    Intake/Output Summary (Last 24 hours) at 10/2/2022 1026  Last data filed at 10/2/2022 0143  Gross per 24 hour   Intake 3100 ml   Output --   Net 3100 ml       Weight:       10/01/22  2138   Weight: 83.9 kg (185 lb)       Most recent vitals:   Vitals:    10/02/22 0353 10/02/22 0501 10/02/22 0701 10/02/22 0800   BP:  105/77 97/70 103/75   BP Location:    Right arm   Patient Position:    Lying   Pulse:  86  74   Resp:    16   Temp:    98 °F (36.7 °C)   TempSrc:    Oral   SpO2: 91% 91%  (!) 89%   Weight:       Height:           Active LDAs/IV Access:   Lines, Drains & Airways     Active LDAs     Name Placement date Placement time Site Days    Peripheral IV 10/01/22 1935 Left Forearm 10/01/22  1935  Forearm  less than 1    Peripheral IV 10/01/22 2025 Right Antecubital 10/01/22  2025  Antecubital  less than 1                Labs (abnormal labs have a star):   Labs Reviewed    COMPREHENSIVE METABOLIC PANEL - Abnormal; Notable for the following components:       Result Value    Glucose 167 (*)     Creatinine 0.63 (*)     ALT (SGPT) 93 (*)     AST (SGOT) 118 (*)     All other components within normal limits    Narrative:     GFR Normal >60  Chronic Kidney Disease <60  Kidney Failure <15     LACTIC ACID, PLASMA - Abnormal; Notable for the following components:    Lactate 2.5 (*)     All other components within normal limits   CBC WITH AUTO DIFFERENTIAL - Abnormal; Notable for the following components:    WBC 20.61 (*)     RDW 12.0 (*)     Neutrophil % 78.0 (*)     Lymphocyte % 15.1 (*)     Neutrophils, Absolute 16.07 (*)     Lymphocytes, Absolute 3.11 (*)     Monocytes, Absolute 1.15 (*)     Immature Grans, Absolute 0.10 (*)     All other components within normal limits   LACTIC ACID, REFLEX - Abnormal; Notable for the following components:    Lactate 3.4 (*)     All other components within normal limits   LACTIC ACID, REFLEX - Abnormal; Notable for the following components:    Lactate 2.4 (*)     All other components within normal limits   BASIC METABOLIC PANEL - Abnormal; Notable for the following components:    Glucose 144 (*)     Creatinine 0.62 (*)     Calcium 7.7 (*)     All other components within normal limits    Narrative:     GFR Normal >60  Chronic Kidney Disease <60  Kidney Failure <15     CBC (NO DIFF) - Abnormal; Notable for the following components:    WBC 17.59 (*)     RBC 3.95 (*)     Hemoglobin 12.4 (*)     Hematocrit 36.2 (*)     RDW 12.0 (*)     All other components within normal limits   LACTIC ACID, REFLEX - Abnormal; Notable for the following components:    Lactate 2.4 (*)     All other components within normal limits   LIPASE - Normal   URINALYSIS W/ MICROSCOPIC IF INDICATED (NO CULTURE) - Normal    Narrative:     Urine microscopic not indicated.   PROCALCITONIN - Normal    Narrative:     As a Marker for Sepsis (Non-Neonates):    1. <0.5 ng/mL represents a low risk  "of severe sepsis and/or septic shock.  2. >2 ng/mL represents a high risk of severe sepsis and/or septic shock.    As a Marker for Lower Respiratory Tract Infections that require antibiotic therapy:    PCT on Admission    Antibiotic Therapy       6-12 Hrs later    >0.5                Strongly Recommended  >0.25 - <0.5        Recommended   0.1 - 0.25          Discouraged              Remeasure/reassess PCT  <0.1                Strongly Discouraged     Remeasure/reassess PCT    As 28 day mortality risk marker: \"Change in Procalcitonin Result\" (>80% or <=80%) if Day 0 (or Day 1) and Day 4 values are available. Refer to [email or web address]    Change in PCT <=80%  A decrease of PCT levels below or equal to 80% defines a positive change in PCT test result representing a higher risk for 28-day all-cause mortality of patients diagnosed with severe sepsis for septic shock.    Change in PCT >80%  A decrease of PCT levels of more than 80% defines a negative change in PCT result representing a lower risk for 28-day all-cause mortality of patients diagnosed with severe sepsis or septic shock.      BNP (IN-HOUSE) - Normal    Narrative:     Among patients with dyspnea, NT-proBNP is highly sensitive for the detection of acute congestive heart failure. In addition NT-proBNP of <300 pg/ml effectively rules out acute congestive heart failure with 99% negative predictive value.    Results may be falsely decreased if patient taking Biotin.     TROPONIN (IN-HOUSE) - Normal    Narrative:     Troponin T Reference Range:  <= 0.03 ng/mL-   Negative for AMI  >0.03 ng/mL-     Abnormal for myocardial necrosis.  Clinicians would have to utilize clinical acumen, EKG, Troponin and serial changes to determine if it is an Acute Myocardial Infarction or myocardial injury due to an underlying chronic condition.       Results may be falsely decreased if patient taking Biotin.     BLOOD CULTURE   BLOOD CULTURE   RAINBOW DRAW    " Narrative:     The following orders were created for panel order [facility] Draw.  Procedure                               Abnormality         Status                     ---------                               -----------         ------                     Green Top (Gel)[[id number]]                                  Final result               Lavender Top[[id number]]                                     Final result               Gold Top - SST[[id number]]                                   Final result               Light Blue Top[[id number]]                                   Final result                 Please view results for these tests on the individual orders.   LACTIC ACID, REFLEX   CBC AND DIFFERENTIAL    Narrative:     The following orders were created for panel order CBC & Differential.  Procedure                               Abnormality         Status                     ---------                               -----------         ------                     CBC Auto Differential[[id number]]        Abnormal            Final result                 Please view results for these tests on the individual orders.   GREEN TOP   LAVENDER TOP   GOLD TOP - SST   LIGHT BLUE TOP       EKG:   ECG 12 Lead   Final Result   HEART RATE= 94  bpm   RR Interval= 638  ms   OH Interval= 237  ms   P Horizontal Axis= 2  deg   P Front Axis= 28  deg   QRSD Interval= 94  ms   QT Interval= 361  ms   QRS Axis= -1  deg   T Wave Axis= 52  deg   - ABNORMAL ECG -   Sinus rhythm   Prolonged OH interval   Low voltage, precordial leads   Artifact in lead(s) I, II, aVR, aVL   Electronically Signed By: Kam Scott ([facility]) 01-Oct-2022 21:41:23   Date and Time of Study: 2022-10-01 20:42:55          Meds given in ED:   Medications   sodium chloride 0.9 % flush 10 mL (has no administration in time range)   sodium chloride 0.9 % flush 10 mL (10 mL Intravenous Given 10/2/22 0041)   sodium chloride 0.9 % flush 10 mL (10 mL Intravenous Given 10/2/22 0858)    nitroglycerin (NITROSTAT) SL tablet 0.4 mg (has no administration in time range)   acetaminophen (TYLENOL) tablet 650 mg (has no administration in time range)     Or   acetaminophen (TYLENOL) 160 MG/5ML solution 650 mg (has no administration in time range)     Or   acetaminophen (TYLENOL) suppository 650 mg (has no administration in time range)   ondansetron (ZOFRAN) tablet 4 mg (has no administration in time range)     Or   ondansetron (ZOFRAN) injection 4 mg (has no administration in time range)   calcium carbonate (TUMS) chewable tablet 500 mg (200 mg elemental) (has no administration in time range)   sodium chloride 0.9 % infusion (100 mL/hr Intravenous Currently Infusing 10/2/22 0712)   Pharmacy to dose vancomycin (has no administration in time range)   Pharmacy Consult - Pharmacy to dose (has no administration in time range)   morphine injection 2 mg (has no administration in time range)   ipratropium-albuterol (DUO-NEB) nebulizer solution 3 mL (has no administration in time range)   ipratropium-albuterol (DUO-NEB) nebulizer solution 3 mL (has no administration in time range)   cefepime 2 gm IVPB in 100 ml NS (VTB) (0 g Intravenous Stopped 10/2/22 0855)   vancomycin (VANCOCIN) 1000 mg/200 mL dextrose 5% IVPB (has no administration in time range)   sodium chloride 0.9 % bolus 500 mL (0 mL Intravenous Stopped 10/1/22 2128)   cefepime 2 gm IVPB in 100 ml NS (VTB) (0 g Intravenous Stopped 10/1/22 2128)   vancomycin 1750 mg/500 mL 0.9% NS IVPB ([facility]) (0 mg/kg × 83.9 kg Intravenous Stopped 10/2/22 0019)   iopamidol (ISOVUE-370) 76 % injection 100 mL (100 mL Intravenous Given 10/1/22 2113)   sodium chloride 0.9 % bolus 1,000 mL (0 mL Intravenous Stopped 10/1/22 2306)   morphine injection 2 mg (2 mg Intravenous Given 10/1/22 2216)   ondansetron (ZOFRAN) injection 4 mg (4 mg Intramuscular Given 10/1/22 2216)   sodium chloride 0.9 % bolus 2,517 mL (0 mL/kg × 83.9 kg Intravenous Stopped 10/2/22 0143)   morphine  injection 2 mg (2 mg Intravenous Given 10/2/22 0537)   ondansetron (ZOFRAN) injection 4 mg (4 mg Intravenous Given 10/2/22 0537)       Imaging results:  CT Abdomen Pelvis With Contrast    Result Date: 10/1/2022   1. Bibasilar consolidation may reflect atelectasis, although correlation with any evidence of pneumonia is recommended. 2. Left upper lobe pulmonary nodule, as well as enlarged left hilar lymph node. Malignancy cannot be excluded. 3. Apparent heterogeneous lesions within the right lobe of the liver. Neoplasm versus abscess would be considerations. Full assessment of the liver lesions is severely limited due to streak artifact and motion artifact. Liver protocol CT or MRI could be considered for further assessment. 4. Diverticulosis, as well as a thick walled segment of sigmoid colon. This may reflect diverticulitis, although follow-up colonoscopy is suggested to exclude any underlying neoplasm. 5. Patient does have some free fluid tracking along the paracolic gutters bilaterally. There may be some additional free fluid around the liver and spleen.  Radiation dose reduction techniques were utilized, including automated exposure control and exposure modulation based on body size.  This report was finalized on 10/1/2022 10:42 PM by Dr. Soheila Mann M.D.      XR Chest 1 View    Result Date: 10/1/2022  Nonspecific bibasilar consolidation.  This report was finalized on 10/1/2022 11:00 PM by Dr. Soheila Mann M.D.      CT Angiogram Chest    Result Date: 10/1/2022   1. Bibasilar consolidation may reflect atelectasis, although correlation with any evidence of pneumonia is recommended. 2. Left upper lobe pulmonary nodule, as well as enlarged left hilar lymph node. Malignancy cannot be excluded. 3. Apparent heterogeneous lesions within the right lobe of the liver. Neoplasm versus abscess would be considerations. Full assessment of the liver lesions is severely limited due to streak artifact and motion  artifact. Liver protocol CT or MRI could be considered for further assessment. 4. Diverticulosis, as well as a thick walled segment of sigmoid colon. This may reflect diverticulitis, although follow-up colonoscopy is suggested to exclude any underlying neoplasm. 5. Patient does have some free fluid tracking along the paracolic gutters bilaterally. There may be some additional free fluid around the liver and spleen.  Radiation dose reduction techniques were utilized, including automated exposure control and exposure modulation based on body size.  This report was finalized on 10/1/2022 10:42 PM by Dr. Soheila Mann M.D.        Ambulatory status:    Bed rest    Social issues:   Social History     Socioeconomic History   • Marital status: Single       NIH Stroke Scale:         Brittany Robin RN  10/02/22 10:26 EDT

## 2022-10-02 NOTE — CONSULTS
[facility]   Consult Note    Patient Name: Boni Vega  : 1940  MRN: [id number]  Primary Care Physician:  System, Provider Not In  Referring Physician: Adrianne Gomez MD  Date of admission: 10/1/2022    Inpatient General Surgery Consult  Consult performed by: Edward Meyers Jr., MD  Consult ordered by: Melanie Pickard APRN        Subjective   Subjective     Reason for Consult/ Chief Complaint: Diverticulitis    History of Present Illness  Boni Vega is a 81 y.o. male who was not able to provide much of a history.  He states that he presented to the emergency room for vomiting.  He believes it has been present for 2 days.  According to the notes from the emergency room he presented with complaints of lower abdominal pain that was associated with nausea and vomiting.    He was evaluated with a CT angiogram of the chest which showed a left upper lobe nodule with an enlarged hilar lymph node that was concerning for malignancy.  He also had a CT scan of the abdomen and pelvis that showed a lesion in the right lobe of the liver as well as inflammatory changes involving the sigmoid colon.    He believes he has had a previous colonoscopy but is not certain.    He had an admission in 2018 for acute diverticulitis at a [facility].  He was managed conservatively using Rocephin and Flagyl and seemed to improve.  There was no note of the liver lesion at the time of that admission.    He has had a previous cholecystectomy.  No other past surgical history is known.    Review of Systems   Unable to perform ROS: Mental status change        Personal History     No past medical history on file.    No past surgical history on file.    Family History: family history is not on file. Otherwise pertinent FHx was reviewed and not pertinent to current issue.    Social History:      Home Medications:        Allergies:  No Known Allergies    Objective    Objective     Vitals:  Temp:  [97.5 °F (36.4 °C)-98.9  °F (37.2 °C)] 97.5 °F (36.4 °C)  Heart Rate:  [70-92] 84  Resp:  [16-18] 18  BP: ()/(69-94) 122/73  Flow (L/min):  [2] 2    Physical Exam  Constitutional:       Appearance: Normal appearance. He is well-developed. He is not toxic-appearing.   Eyes:      General: No scleral icterus.  Pulmonary:      Effort: Pulmonary effort is normal. No respiratory distress.   Abdominal:      Palpations: Abdomen is soft.      Tenderness: There is generalized abdominal tenderness.      Comments: The abdomen is soft and mildly tender diffusely especially in the lower quadrants.  There is no guarding or rebound.  The exam is benign.   Skin:     General: Skin is warm and dry.   Psychiatric:         Behavior: Behavior is cooperative.         Result Review    Result Review:  I have personally reviewed the results from the time of this admission to 10/2/2022 13:50 EDT and agree with these findings:  [x]  Laboratory list / accordion  []  Microbiology  [x]  Radiology  []  EKG/Telemetry   []  Cardiology/Vascular   []  Pathology  [x]  Old records  []  Other:      Assessment & Plan   Assessment / Plan     Brief Patient Summary with assessment and plan:  Boni Vega is a 81 y.o. male who presented to the emergency room with abdominal pain and nausea and vomiting.  CT scan of the abdomen and pelvis shows apparent acute diverticulitis and a liver mass.    1.  Acute diverticulitis: The patient appears to have uncomplicated acute diverticulitis involving the sigmoid colon.  He has been admitted and started on cefepime as well as vancomycin.  We will continue conservative management and follow his clinical course closely.  I would expect the diverticulitis to respond to antibiotics.    2.  Liver lesion: The patient has a large lesion in the liver that may represent a mass or an abscess.  At some point he will need a liver biopsy which can assess the lesion and perform a biopsy if it is a solid mass or place a drain if it is an  abscess.    Edward Meyers Jr., MD

## 2022-10-02 NOTE — ED PROVIDER NOTES
MD ATTESTATION NOTE    The JUAN and I have discussed this patient's history, physical exam, and treatment plan.  I have reviewed the documentation and personally had a face to face interaction with the patient. I affirm the documentation and agree with the treatment and plan.  The attached note describes my personal findings.      I provided a substantive portion of the care of the patient.  I personally performed the physical exam in its entirety, and below are my findings.  For this patient encounter, the patient wore surgical mask, I wore full protective PPE including N95 and eye protection    Brief HPI: 81-year-old male who presents to emergency room today with lower abdominal pain with nausea and vomiting that began today.  The patient had minimally low oxygen saturations on arrival and the family states he has a history of aspiration pneumonia.  The patient is a poor historian but just describes pain in his lower abdomen and denies fevers, chills or cough    General : 81-year-old chronically ill-appearing patient who is patient is awake and alert  HEENT: NCAT  CV: Heart is regular with no murmurs  Respiratory: Mild rhonchi in bases bilaterally  Abd: Lower abdominal tenderness with guarding and diminished bowel sounds  Ext: No acute abnormalities  Skin: No rash  Neuro: Cranial nerves II through XII grossly intact as tested.  No acute lateralizing deficits.  Psych: Normal mood and affect      Plan: We will check labs, chest x-ray and CT chest/abdomen pelvis for further evaluation  Patient's white count was 20 with a lactic acid of 2.5 and thus the patient received IV fluids, cefepime and vancomycin.  His urinalysis was unremarkable.  His CTA chest shows no PE but bibasilar atelectasis versus infiltrate.  His abdominal CT is suspicious for diverticulitis and liver lesions that could be mets versus abscesses.  At this time the patient's vital signs have improved and we will admit him to the hospital for further  evaluation and care.  I have discussed the above with the patient and he understands and agrees the plan.     Tremaine Haro MD  10/02/22 0001

## 2022-10-02 NOTE — PROGRESS NOTES
"[facility] Clinical Pharmacy Services: Vancomycin Pharmacokinetic Initial Consult Note    Boni Vega is a 81 y.o. male who is on day 1 of pharmacy to dose vancomycin.    Indication: Sepsis  Consulting Provider: OSMANI Jensen  Planned Duration of Therapy: 5 days   Loading Dose Ordered or Given: 1750 mg on 10/1 at 2200  MRSA PCR performed: No  Culture/Source: In process   Target: -600 mg/L.hr   Other Antimicrobials: Cefepime     Vitals/Labs  Ht: 182.9 cm (72\"); Wt: 83.9 kg (185 lb)  Temp Readings from Last 1 Encounters:   10/01/22 98.9 °F (37.2 °C)    Estimated Creatinine Clearance: 107.1 mL/min (A) (by C-G formula based on SCr of 0.62 mg/dL (L)).       Results from last 7 days   Lab Units 10/02/22  0525 10/01/22  1939   CREATININE mg/dL 0.62* 0.63*   WBC 10*3/mm3 17.59* 20.61*     Assessment/Plan:    Vancomycin Dose:   1000 mg IV every  12  hours  Predictive AUC level for the dose ordered is 487 mg/L.hr, which is within the target of 400-600 mg/L.hr  Vanc Trough has been ordered for 10/3 at 0930     Pharmacy will follow patient's kidney function and will adjust doses and obtain levels as necessary. Thank you for involving pharmacy in this patient's care. Please contact pharmacy with any questions or concerns.                           Maria T Turcios [facility]  Clinical Pharmacist   "

## 2022-10-02 NOTE — CONSULTS
[facility]  Initial Inpatient Consult Note    Referring Provider: Melly    Reason for Consultation: Diverticulitis    Subjective     History of present illness:    81 y.o. male presenting to the emergency room complaining of abdominal pain.  Pain located primarily in the lower abdomen is been associated with some nausea.  CT scan performed in the emergency room shows diverticulosis as well as thickening of the sigmoid colon suspicious for diverticulitis.  Also note of nonspecific heterogeneous lesions within the right lobe of the liver neoplasm versus abscess not well defined.  WBC 20 on admission.  He has been started on IV abx.      Past Medical History:  No past medical history on file.  Past Surgical History:  No past surgical history on file.   Social History:   Social History     Tobacco Use   • Smoking status: Not on file   • Smokeless tobacco: Not on file   Substance Use Topics   • Alcohol use: Not on file      Family History:  No family history on file.    Home Meds:  No medications prior to admission.     Current Meds:   cefepime, 2 g, Intravenous, Q8H  sodium chloride, 10 mL, Intravenous, Q12H  vancomycin, 1,000 mg, Intravenous, Q12H      Allergies:  No Known Allergies  Review of Systems  All systems were reviewed and negative except for:  Gastrointestinal: positive for  nausea and pain     Objective     Vital Signs  Temp:  [97.5 °F (36.4 °C)-98.9 °F (37.2 °C)] 97.5 °F (36.4 °C)  Heart Rate:  [70-92] 84  Resp:  [16-18] 18  BP: ()/(69-94) 122/73  Physical Exam:  General Appearance:     Chronically ill appearing, malnourished   Head:    Normocephalic, without obvious abnormality, atraumatic   Eyes:            Sunken orbits   Throat:   No oral lesions, no thrush, oral mucosa moist   Neck:   No adenopathy, supple, trachea midline, no thyromegaly, no carotid bruit, no JVD   Lungs:     Clear to auscultation,respirations regular, even and            unlabored    Heart:    Regular  rhythm and normal rate, normal S1 and S2, no        murmur, no gallop, no rub, no click   Chest Wall:    No abnormalities observed   Abdomen:     Normal bowel sounds, no masses, no organomegaly, soft     nontender, nondistended, no guarding, no rebound                 tenderness   Rectal:     Deferred   Extremities:   no edema, no cyanosis, no redness   Skin:   No bleeding, bruising or rash   Lymph nodes:   No palpable adenopathy       Results Review:   I reviewed the patient's new clinical results.  I reviewed the patient's new imaging results and agree with the interpretation.    Results from last 7 days   Lab Units 10/02/22  0525 10/01/22  1939   WBC 10*3/mm3 17.59* 20.61*   HEMOGLOBIN g/dL 12.4* 15.8   HEMATOCRIT % 36.2* 45.4   PLATELETS 10*3/mm3 177 216     Results from last 7 days   Lab Units 10/02/22  0525 10/01/22  1939   SODIUM mmol/L 139 138   POTASSIUM mmol/L 3.8 3.7   CHLORIDE mmol/L 106 102   CO2 mmol/L 23.2 26.0   BUN mg/dL 13 14   CREATININE mg/dL 0.62* 0.63*   CALCIUM mg/dL 7.7* 9.0   BILIRUBIN mg/dL  --  0.6   ALK PHOS U/L  --  77   ALT (SGPT) U/L  --  93*   AST (SGOT) U/L  --  118*   GLUCOSE mg/dL 144* 167*         Lab Results   Lab Value Date/Time    LIPASE 13 10/01/2022 1939    LIPASE 11 (L) 06/29/2018 1214       Radiology:  XR Chest 1 View   Final Result   Nonspecific bibasilar consolidation.       This report was finalized on 10/1/2022 11:00 PM by Dr. Soheila Mann M.D.          CT Abdomen Pelvis With Contrast   Final Result       1. Bibasilar consolidation may reflect atelectasis, although correlation   with any evidence of pneumonia is recommended.   2. Left upper lobe pulmonary nodule, as well as enlarged left hilar   lymph node. Malignancy cannot be excluded.   3. Apparent heterogeneous lesions within the right lobe of the liver.   Neoplasm versus abscess would be considerations. Full assessment of the   liver lesions is severely limited due to streak artifact and motion   artifact.  Liver protocol CT or MRI could be considered for further   assessment.   4. Diverticulosis, as well as a thick walled segment of sigmoid colon.   This may reflect diverticulitis, although follow-up colonoscopy is   suggested to exclude any underlying neoplasm.   5. Patient does have some free fluid tracking along the paracolic   gutters bilaterally. There may be some additional free fluid around the   liver and spleen.       Radiation dose reduction techniques were utilized, including automated   exposure control and exposure modulation based on body size.       This report was finalized on 10/1/2022 10:42 PM by Dr. Soheila Mann M.D.          CT Angiogram Chest   Final Result       1. Bibasilar consolidation may reflect atelectasis, although correlation   with any evidence of pneumonia is recommended.   2. Left upper lobe pulmonary nodule, as well as enlarged left hilar   lymph node. Malignancy cannot be excluded.   3. Apparent heterogeneous lesions within the right lobe of the liver.   Neoplasm versus abscess would be considerations. Full assessment of the   liver lesions is severely limited due to streak artifact and motion   artifact. Liver protocol CT or MRI could be considered for further   assessment.   4. Diverticulosis, as well as a thick walled segment of sigmoid colon.   This may reflect diverticulitis, although follow-up colonoscopy is   suggested to exclude any underlying neoplasm.   5. Patient does have some free fluid tracking along the paracolic   gutters bilaterally. There may be some additional free fluid around the   liver and spleen.       Radiation dose reduction techniques were utilized, including automated   exposure control and exposure modulation based on body size.       This report was finalized on 10/1/2022 10:42 PM by Dr. Soheila Mann M.D.              Assessment & Plan   Assessment:   1.  Abdominal pain  2.  Diverticulitis of sigmoid colon  3.  Liver lesions    Plan:   I  suspect this gentleman probably has sigmoid diverticulitis with possible liver abscesses although certainly the other consideration would be a neoplastic process with metastatic deposits in the liver.  His white blood cell count is improving with IV antibiotics.  We are awaiting blood cultures.  Check CEA.   We will check a CT scan triple phase liver. He may need IR guided evaluation of liver lesion as well.      I discussed the patients findings and my recommendations with patient.         Leopoldo Vizcarra M.D.  [facility]  [address] [place] [address]  Office: [phone]

## 2022-10-03 ENCOUNTER — APPOINTMENT (OUTPATIENT)
Dept: CT IMAGING | Facility: HOSPITAL | Age: 82
End: 2022-10-03

## 2022-10-03 LAB
ALBUMIN SERPL-MCNC: 2.8 G/DL (ref 3.5–5.2)
ALBUMIN/GLOB SERPL: 1.2 G/DL
ALP SERPL-CCNC: 74 U/L (ref 39–117)
ALT SERPL W P-5'-P-CCNC: 308 U/L (ref 1–41)
ANION GAP SERPL CALCULATED.3IONS-SCNC: 7.6 MMOL/L (ref 5–15)
AST SERPL-CCNC: 177 U/L (ref 1–40)
BASOPHILS # BLD AUTO: 0.08 10*3/MM3 (ref 0–0.2)
BASOPHILS NFR BLD AUTO: 0.6 % (ref 0–1.5)
BILIRUB SERPL-MCNC: 1 MG/DL (ref 0–1.2)
BUN SERPL-MCNC: 13 MG/DL (ref 8–23)
BUN/CREAT SERPL: 21 (ref 7–25)
CALCIUM SPEC-SCNC: 8 MG/DL (ref 8.6–10.5)
CHLORIDE SERPL-SCNC: 106 MMOL/L (ref 98–107)
CO2 SERPL-SCNC: 25.4 MMOL/L (ref 22–29)
CREAT SERPL-MCNC: 0.62 MG/DL (ref 0.76–1.27)
DEPRECATED RDW RBC AUTO: 39.7 FL (ref 37–54)
EGFRCR SERPLBLD CKD-EPI 2021: 94.8 ML/MIN/1.73
EOSINOPHIL # BLD AUTO: 0.04 10*3/MM3 (ref 0–0.4)
EOSINOPHIL NFR BLD AUTO: 0.3 % (ref 0.3–6.2)
ERYTHROCYTE [DISTWIDTH] IN BLOOD BY AUTOMATED COUNT: 12 % (ref 12.3–15.4)
GLOBULIN UR ELPH-MCNC: 2.3 GM/DL
GLUCOSE SERPL-MCNC: 102 MG/DL (ref 65–99)
HCT VFR BLD AUTO: 28 % (ref 37.5–51)
HCT VFR BLD AUTO: 29.2 % (ref 37.5–51)
HGB BLD-MCNC: 9.8 G/DL (ref 13–17.7)
HGB BLD-MCNC: 9.9 G/DL (ref 13–17.7)
IMM GRANULOCYTES # BLD AUTO: 0.07 10*3/MM3 (ref 0–0.05)
IMM GRANULOCYTES NFR BLD AUTO: 0.5 % (ref 0–0.5)
INR PPP: 1.31 (ref 0.9–1.1)
LYMPHOCYTES # BLD AUTO: 1.65 10*3/MM3 (ref 0.7–3.1)
LYMPHOCYTES NFR BLD AUTO: 12.5 % (ref 19.6–45.3)
MAGNESIUM SERPL-MCNC: 1.6 MG/DL (ref 1.6–2.4)
MCH RBC QN AUTO: 31.9 PG (ref 26.6–33)
MCHC RBC AUTO-ENTMCNC: 35.4 G/DL (ref 31.5–35.7)
MCV RBC AUTO: 90.3 FL (ref 79–97)
MONOCYTES # BLD AUTO: 1.08 10*3/MM3 (ref 0.1–0.9)
MONOCYTES NFR BLD AUTO: 8.2 % (ref 5–12)
NEUTROPHILS NFR BLD AUTO: 10.26 10*3/MM3 (ref 1.7–7)
NEUTROPHILS NFR BLD AUTO: 77.9 % (ref 42.7–76)
NRBC BLD AUTO-RTO: 0.1 /100 WBC (ref 0–0.2)
PHOSPHATE SERPL-MCNC: 2.1 MG/DL (ref 2.5–4.5)
PLATELET # BLD AUTO: 115 10*3/MM3 (ref 140–450)
PMV BLD AUTO: 10.6 FL (ref 6–12)
POTASSIUM SERPL-SCNC: 3.8 MMOL/L (ref 3.5–5.2)
PROT SERPL-MCNC: 5.1 G/DL (ref 6–8.5)
PROTHROMBIN TIME: 16.1 SECONDS (ref 11.7–14.2)
RBC # BLD AUTO: 3.1 10*6/MM3 (ref 4.14–5.8)
SODIUM SERPL-SCNC: 139 MMOL/L (ref 136–145)
VANCOMYCIN TROUGH SERPL-MCNC: 12.8 MCG/ML (ref 5–20)
WBC NRBC COR # BLD: 13.18 10*3/MM3 (ref 3.4–10.8)

## 2022-10-03 PROCEDURE — 74170 CT ABD WO CNTRST FLWD CNTRST: CPT

## 2022-10-03 PROCEDURE — 99231 SBSQ HOSP IP/OBS SF/LOW 25: CPT | Performed by: SURGERY

## 2022-10-03 PROCEDURE — 85014 HEMATOCRIT: CPT | Performed by: INTERNAL MEDICINE

## 2022-10-03 PROCEDURE — 80202 ASSAY OF VANCOMYCIN: CPT | Performed by: NURSE PRACTITIONER

## 2022-10-03 PROCEDURE — 85610 PROTHROMBIN TIME: CPT | Performed by: INTERNAL MEDICINE

## 2022-10-03 PROCEDURE — 80053 COMPREHEN METABOLIC PANEL: CPT | Performed by: STUDENT IN AN ORGANIZED HEALTH CARE EDUCATION/TRAINING PROGRAM

## 2022-10-03 PROCEDURE — 83735 ASSAY OF MAGNESIUM: CPT | Performed by: STUDENT IN AN ORGANIZED HEALTH CARE EDUCATION/TRAINING PROGRAM

## 2022-10-03 PROCEDURE — 85025 COMPLETE CBC W/AUTO DIFF WBC: CPT | Performed by: STUDENT IN AN ORGANIZED HEALTH CARE EDUCATION/TRAINING PROGRAM

## 2022-10-03 PROCEDURE — 99232 SBSQ HOSP IP/OBS MODERATE 35: CPT | Performed by: INTERNAL MEDICINE

## 2022-10-03 PROCEDURE — 36415 COLL VENOUS BLD VENIPUNCTURE: CPT | Performed by: STUDENT IN AN ORGANIZED HEALTH CARE EDUCATION/TRAINING PROGRAM

## 2022-10-03 PROCEDURE — 84100 ASSAY OF PHOSPHORUS: CPT | Performed by: STUDENT IN AN ORGANIZED HEALTH CARE EDUCATION/TRAINING PROGRAM

## 2022-10-03 PROCEDURE — 92610 EVALUATE SWALLOWING FUNCTION: CPT

## 2022-10-03 PROCEDURE — 25010000002 VANCOMYCIN PER 500 MG: Performed by: NURSE PRACTITIONER

## 2022-10-03 PROCEDURE — 25010000002 IOPAMIDOL 61 % SOLUTION: Performed by: INTERNAL MEDICINE

## 2022-10-03 PROCEDURE — 25010000002 PIPERACILLIN SOD-TAZOBACTAM PER 1 G: Performed by: STUDENT IN AN ORGANIZED HEALTH CARE EDUCATION/TRAINING PROGRAM

## 2022-10-03 PROCEDURE — 85018 HEMOGLOBIN: CPT | Performed by: INTERNAL MEDICINE

## 2022-10-03 RX ORDER — POTASSIUM CHLORIDE 1.5 G/1.77G
40 POWDER, FOR SOLUTION ORAL AS NEEDED
Status: DISCONTINUED | OUTPATIENT
Start: 2022-10-03 | End: 2022-10-06

## 2022-10-03 RX ORDER — MAGNESIUM SULFATE HEPTAHYDRATE 40 MG/ML
4 INJECTION, SOLUTION INTRAVENOUS AS NEEDED
Status: DISCONTINUED | OUTPATIENT
Start: 2022-10-03 | End: 2022-10-06

## 2022-10-03 RX ORDER — POTASSIUM CHLORIDE 750 MG/1
40 TABLET, FILM COATED, EXTENDED RELEASE ORAL AS NEEDED
Status: DISCONTINUED | OUTPATIENT
Start: 2022-10-03 | End: 2022-10-06

## 2022-10-03 RX ORDER — MAGNESIUM SULFATE HEPTAHYDRATE 40 MG/ML
2 INJECTION, SOLUTION INTRAVENOUS AS NEEDED
Status: DISCONTINUED | OUTPATIENT
Start: 2022-10-03 | End: 2022-10-06

## 2022-10-03 RX ORDER — PANTOPRAZOLE SODIUM 40 MG/1
40 TABLET, DELAYED RELEASE ORAL
Status: DISCONTINUED | OUTPATIENT
Start: 2022-10-04 | End: 2022-10-18 | Stop reason: HOSPADM

## 2022-10-03 RX ORDER — POTASSIUM CHLORIDE 7.45 MG/ML
10 INJECTION INTRAVENOUS
Status: DISCONTINUED | OUTPATIENT
Start: 2022-10-03 | End: 2022-10-06

## 2022-10-03 RX ADMIN — ASPIRIN 81 MG: 81 TABLET, CHEWABLE ORAL at 11:38

## 2022-10-03 RX ADMIN — DULOXETINE HYDROCHLORIDE 60 MG: 60 CAPSULE, DELAYED RELEASE ORAL at 11:38

## 2022-10-03 RX ADMIN — TAMSULOSIN HYDROCHLORIDE 0.4 MG: 0.4 CAPSULE ORAL at 21:25

## 2022-10-03 RX ADMIN — Medication 10 ML: at 11:39

## 2022-10-03 RX ADMIN — VANCOMYCIN HYDROCHLORIDE 1000 MG: 1 INJECTION, SOLUTION INTRAVENOUS at 10:59

## 2022-10-03 RX ADMIN — ACETAMINOPHEN 650 MG: 325 TABLET, FILM COATED ORAL at 21:35

## 2022-10-03 RX ADMIN — POTASSIUM PHOSPHATE, MONOBASIC AND POTASSIUM PHOSPHATE, DIBASIC 15 MMOL: 224; 236 INJECTION, SOLUTION, CONCENTRATE INTRAVENOUS at 17:48

## 2022-10-03 RX ADMIN — MONTELUKAST SODIUM 10 MG: 10 TABLET, FILM COATED ORAL at 21:25

## 2022-10-03 RX ADMIN — SODIUM CHLORIDE 100 ML/HR: 9 INJECTION, SOLUTION INTRAVENOUS at 17:48

## 2022-10-03 RX ADMIN — MULTIPLE VITAMINS W/ MINERALS TAB 1 TABLET: TAB at 11:38

## 2022-10-03 RX ADMIN — CETIRIZINE HYDROCHLORIDE 5 MG: 10 TABLET ORAL at 11:38

## 2022-10-03 RX ADMIN — TAZOBACTAM SODIUM AND PIPERACILLIN SODIUM 3.38 G: 375; 3 INJECTION, SOLUTION INTRAVENOUS at 01:41

## 2022-10-03 RX ADMIN — TAZOBACTAM SODIUM AND PIPERACILLIN SODIUM 3.38 G: 375; 3 INJECTION, SOLUTION INTRAVENOUS at 21:25

## 2022-10-03 RX ADMIN — Medication 10 ML: at 21:26

## 2022-10-03 RX ADMIN — TAZOBACTAM SODIUM AND PIPERACILLIN SODIUM 3.38 G: 375; 3 INJECTION, SOLUTION INTRAVENOUS at 12:30

## 2022-10-03 RX ADMIN — IOPAMIDOL 85 ML: 612 INJECTION, SOLUTION INTRAVENOUS at 10:26

## 2022-10-03 NOTE — PLAN OF CARE
Goal Outcome Evaluation:  Plan of Care Reviewed With: patient        Progress: no change  Outcome Evaluation: Pleasantly confused.  Bed alarm in use.  Mild pain to abdomen with palpation.  Speech to see today.  NP cough.  Oral suction set up at bedside.

## 2022-10-03 NOTE — PLAN OF CARE
Problem: Adult Inpatient Plan of Care  Goal: Plan of Care Review  Outcome: Ongoing, Not Progressing  Flowsheets  Taken 10/3/2022 1835 by Jasmyn Tolbert, RN  Progress: no change  Outcome Evaluation:   Pts VSS on 3L NC this shift. Pt. down for CT of liver this shift, awaiting plan based on CT results. Speech preformd a swallow study on pt this shift and advanced pt to nectar/syrup thick liquids. Pts. phosphorus 2.1 this AM   phosphorus currently being replaced. Will continue to monitor.  Taken 10/3/2022 1546 by Karley Lynch MA,CCC-SLP  Plan of Care Reviewed With: patient   Goal Outcome Evaluation:           Progress: no change  Outcome Evaluation: Pts VSS on 3L NC this shift. Pt. down for CT of liver this shift, awaiting plan based on CT results. Speech preformd a swallow study on pt this shift and advanced pt to nectar/syrup thick liquids. Pts. phosphorus 2.1 this AM; phosphorus currently being replaced. Will continue to monitor.

## 2022-10-03 NOTE — PROGRESS NOTES
[facility]  Inpatient Progress Note    Reason for Follow Up:  Liver lesion, acute diverticulitis    Subjective     Interval History:   Patient has no complaints initially upon questioning but on further interview, he does endorse right upper quadrant pain.  He denies nausea.    Current Facility-Administered Medications:   •  acetaminophen (TYLENOL) tablet 650 mg, 650 mg, Oral, Q4H PRN **OR** acetaminophen (TYLENOL) 160 MG/5ML solution 650 mg, 650 mg, Oral, Q4H PRN **OR** acetaminophen (TYLENOL) suppository 650 mg, 650 mg, Rectal, Q4H PRN, Melanie Pickard APRN  •  aspirin chewable tablet 81 mg, 81 mg, Oral, Daily, Adrianne Gomez MD, 81 mg at 10/03/22 1138  •  calcium carbonate (TUMS) chewable tablet 500 mg (200 mg elemental), 2 tablet, Oral, BID PRN, Melanie Pickard APRN  •  cetirizine (zyrTEC) tablet 5 mg, 5 mg, Oral, Daily, Adrianne Gomez MD, 5 mg at 10/03/22 1138  •  DULoxetine (CYMBALTA) DR capsule 60 mg, 60 mg, Oral, Daily, Adrianne Gomez MD, 60 mg at 10/03/22 1138  •  Enoxaparin Sodium (LOVENOX) syringe 40 mg, 40 mg, Subcutaneous, Q24H, Adrianne Gomez MD, 40 mg at 10/02/22 2040  •  HYDROmorphone (DILAUDID) injection 0.5 mg, 0.5 mg, Intravenous, Q2H PRN, Adrianne Gomez MD  •  ipratropium-albuterol (DUO-NEB) nebulizer solution 3 mL, 3 mL, Nebulization, Q4H PRN, Melanie Pickard APRN  •  ipratropium-albuterol (DUO-NEB) nebulizer solution 3 mL, 3 mL, Nebulization, Q4H PRN, Tej Bowman MD  •  montelukast (SINGULAIR) tablet 10 mg, 10 mg, Oral, Nightly, Adrianne Gomez MD, 10 mg at 10/02/22 2037  •  morphine injection 2 mg, 2 mg, Intravenous, Q3H PRN, Melanie Pickard APRN, 2 mg at 10/02/22 1054  •  multivitamin with minerals 1 tablet, 1 tablet, Oral, Daily, [facility] Center[facility] phan MD, 1 tablet at 10/03/22 1138  •  nitroglycerin (NITROSTAT) SL tablet 0.4 mg, 0.4 mg, Sublingual, Q5 Min PRN, Melanie Pickard APRN  •  ondansetron  (ZOFRAN) tablet 4 mg, 4 mg, Oral, Q6H PRN **OR** ondansetron (ZOFRAN) injection 4 mg, 4 mg, Intravenous, Q6H PRN, Melanie Pickard APRN  •  oxyCODONE-acetaminophen (PERCOCET) 5-325 MG per tablet 1 tablet, 1 tablet, Oral, Q6H PRN, Adrianne Gomez MD  •  [START ON 10/4/2022] pantoprazole (PROTONIX) EC tablet 40 mg, 40 mg, Oral, Q AM, Adrianne Gomez MD  •  Pharmacy Consult - Pharmacy to dose, , Does not apply, Continuous PRN, Melanie Pickard APRN  •  Pharmacy to dose vancomycin, , Does not apply, Continuous PRN, Melanie Pickard APRN  •  piperacillin-tazobactam (ZOSYN) 3.375 g in iso-osmotic dextrose 50 ml (premix), 3.375 g, Intravenous, Q8H, Adrianne Gomez MD, Stopped at 10/03/22 0341  •  [COMPLETED] Insert peripheral IV, , , Once **AND** sodium chloride 0.9 % flush 10 mL, 10 mL, Intravenous, PRN, Jacob Yang III, PA  •  sodium chloride 0.9 % flush 10 mL, 10 mL, Intravenous, Q12H, Melanie Pickard APRN, 10 mL at 10/03/22 1139  •  sodium chloride 0.9 % flush 10 mL, 10 mL, Intravenous, PRN, Melanie Pickard APRN, 10 mL at 10/02/22 1054  •  sodium chloride 0.9 % infusion, 100 mL/hr, Intravenous, Continuous, Adrianne Gomez MD, Last Rate: 100 mL/hr at 10/03/22 0557, 100 mL/hr at 10/03/22 0557  •  tamsulosin (FLOMAX) 24 hr capsule 0.4 mg, 0.4 mg, Oral, Nightly, Adrianne Gomez MD, 0.4 mg at 10/02/22 2037  •  vancomycin (VANCOCIN) 1000 mg/200 mL dextrose 5% IVPB, 1,000 mg, Intravenous, Q12H, Melnaie Pickard APRN, Last Rate: 0 mL/hr at 10/03/22 0036, 1,000 mg at 10/03/22 1059  Review of Systems:    Negative for fevers or chills, positive for right upper quadrant pain, negative for nausea or vomiting    Objective     Vital Signs  Temp:  [97.5 °F (36.4 °C)-98.4 °F (36.9 °C)] 98.3 °F (36.8 °C)  Heart Rate:  [] 92  Resp:  [16-18] 16  BP: ()/(62-76) 106/62  Body mass index is 24.28 kg/m².  No intake or output data in the 24 hours ending 10/03/22  1153  No intake/output data recorded.     Physical Exam:   General: patient awake, alert and cooperative   Eyes: Normal lids and lashes, no scleral icterus   Neck: supple, normal ROM   Skin: warm and dry, not jaundiced   Cardiovascular: Tachycardic, irregular   Pulm: Coarse breath sounds bilaterally, regular and unlabored   Abdomen: soft, tender in the right upper quadrant without rebound or guarding, nondistended; normal bowel sounds   Extremities: no rash, 1+ lower extremity edema bilaterally       Results Review:     I reviewed the patient's new clinical results.    Results from last 7 days   Lab Units 10/03/22  0946 10/02/22  0525 10/01/22  1939   WBC 10*3/mm3 13.18* 17.59* 20.61*   HEMOGLOBIN g/dL 9.9* 12.4* 15.8   HEMATOCRIT % 28.0* 36.2* 45.4   PLATELETS 10*3/mm3 115* 177 216     Results from last 7 days   Lab Units 10/03/22  0946 10/02/22  0525 10/01/22  1939   SODIUM mmol/L 139 139 138   POTASSIUM mmol/L 3.8 3.8 3.7   CHLORIDE mmol/L 106 106 102   CO2 mmol/L 25.4 23.2 26.0   BUN mg/dL 13 13 14   CREATININE mg/dL 0.62* 0.62* 0.63*   CALCIUM mg/dL 8.0* 7.7* 9.0   BILIRUBIN mg/dL 1.0  --  0.6   ALK PHOS U/L 74  --  77   ALT (SGPT) U/L 308*  --  93*   AST (SGOT) U/L 177*  --  118*   GLUCOSE mg/dL 102* 144* 167*         Lab Results   Lab Value Date/Time    LIPASE 13 10/01/2022 1939    LIPASE 11 (L) 06/29/2018 1214       Radiology:  CT Abdomen With & Without Contrast   Preliminary Result   There is increasing hemoperitoneum and hematoma identified today. The CT   findings are most concerning for a hemorrhage hepatic lesion that has   now ruptured. The underlying lesion may represent a hepatocellular   carcinoma. There are additional lesions seen within the liver as   described above. These findings were discussed by telephone with Dr. Vizcarra at the time of dictation.       Radiation dose reduction techniques were utilized, including automated   exposure control and exposure modulation based on body size.               XR Chest 1 View   Final Result   Nonspecific bibasilar consolidation.       This report was finalized on 10/1/2022 11:00 PM by Dr. Soheila Mann M.D.          CT Abdomen Pelvis With Contrast   Final Result       1. Bibasilar consolidation may reflect atelectasis, although correlation   with any evidence of pneumonia is recommended.   2. Left upper lobe pulmonary nodule, as well as enlarged left hilar   lymph node. Malignancy cannot be excluded.   3. Apparent heterogeneous lesions within the right lobe of the liver.   Neoplasm versus abscess would be considerations. Full assessment of the   liver lesions is severely limited due to streak artifact and motion   artifact. Liver protocol CT or MRI could be considered for further   assessment.   4. Diverticulosis, as well as a thick walled segment of sigmoid colon.   This may reflect diverticulitis, although follow-up colonoscopy is   suggested to exclude any underlying neoplasm.   5. Patient does have some free fluid tracking along the paracolic   gutters bilaterally. There may be some additional free fluid around the   liver and spleen.       Radiation dose reduction techniques were utilized, including automated   exposure control and exposure modulation based on body size.       This report was finalized on 10/1/2022 10:42 PM by Dr. Soheila Mann M.D.          CT Angiogram Chest   Final Result       1. Bibasilar consolidation may reflect atelectasis, although correlation   with any evidence of pneumonia is recommended.   2. Left upper lobe pulmonary nodule, as well as enlarged left hilar   lymph node. Malignancy cannot be excluded.   3. Apparent heterogeneous lesions within the right lobe of the liver.   Neoplasm versus abscess would be considerations. Full assessment of the   liver lesions is severely limited due to streak artifact and motion   artifact. Liver protocol CT or MRI could be considered for further   assessment.   4. Diverticulosis, as  well as a thick walled segment of sigmoid colon.   This may reflect diverticulitis, although follow-up colonoscopy is   suggested to exclude any underlying neoplasm.   5. Patient does have some free fluid tracking along the paracolic   gutters bilaterally. There may be some additional free fluid around the   liver and spleen.       Radiation dose reduction techniques were utilized, including automated   exposure control and exposure modulation based on body size.       This report was finalized on 10/1/2022 10:42 PM by Dr. Soheila Mann M.D.              Assessment & Plan     Patient Active Problem List   Diagnosis   • Diverticulitis   • COPD (chronic obstructive pulmonary disease) (HCC)   • Benign essential tremor   • Status post deep brain stimulator placement   • Sigmoid diverticulitis   • Liver lesion   • History of CVA (cerebrovascular accident)   • History of aspiration pneumonia   • Essential hypertension     All problems are new to me today  Assessment:  1. Liver lesion-CT suggest possible bleeding from liver lesion, not an abscess per radiology  2. Acute diverticulitis  3. Anemia - acute post hemorrhagic  4. hemoperitoneum      Plan:  · CT findings noted-discussed with interventional radiology.  No active extravasation seen on most recent CT.  Bleeding appears to have stopped.  Recommend close observation of H&H to ensure stability.  If any signs of recurrent bleeding, may need interventional radiology to embolize  · Check AFP given concern that the underlying liver lesion as possibly HCC  · Follow H&H-transfuse as needed    I discussed the patients findings and my recommendations with patient and Dr Worrell.    Leeann Buckley MD

## 2022-10-03 NOTE — PROGRESS NOTES
Chief Complaint:    Liver lesion and acute diverticulitis    Subjective:    The patient is stable.  He is complaining of abdominal pain.  He is a poor historian which limits the information he can provide.    Objective:    Temp:  [97.5 °F (36.4 °C)-98.4 °F (36.9 °C)] 98.3 °F (36.8 °C)  Heart Rate:  [] 92  Resp:  [16-18] 16  BP: ()/(62-76) 106/62    Physical Exam  Constitutional:       General: He is awake.   Abdominal:      Palpations: Abdomen is soft.      Tenderness: There is abdominal tenderness in the right upper quadrant and left lower quadrant.      Comments: The abdomen is soft and tender in the right upper quadrant as well as the left lower quadrant.   Psychiatric:         Behavior: Behavior is cooperative.         Results:    Labs are pending this morning.    Assessment/Plan:    The patient has acute diverticulitis that is being treated with antibiotics.  We will continue with conservative management    The patient also has a liver lesion.  A CT scan of the abdomen with triple phase liver protocol has been ordered by gastroenterology.  We will await these results to determine the next step in management of the lesion.    Edward Meyers Jr., M.D.

## 2022-10-03 NOTE — THERAPY EVALUATION
Acute Care - Speech Language Pathology   Swallow Initial Evaluation [facility]     Patient Name: Boni Vega  : 1940  MRN: [id number]  Today's Date: 10/3/2022               Admit Date: 10/1/2022    Visit Dx:     ICD-10-CM ICD-9-CM   1. Diverticulitis  K57.92 562.11   2. Sepsis, due to unspecified organism, unspecified whether acute organ dysfunction present ([facility])  A41.9 038.9     995.91   3. Abnormal CT of the chest  R93.89 793.2   4. Abnormal CT of the abdomen  R93.5 793.6     Patient Active Problem List   Diagnosis   • Diverticulitis   • COPD (chronic obstructive pulmonary disease) ([facility])   • Benign essential tremor   • Status post deep brain stimulator placement   • Sigmoid diverticulitis   • Liver lesion   • History of CVA (cerebrovascular accident)   • History of aspiration pneumonia   • Essential hypertension     Past Medical History:   Diagnosis Date   • COPD (chronic obstructive pulmonary disease) ([facility])    • S/P deep brain stimulator placement     essential tremors--15 yrs ago   • Stroke ([facility])     left hand dexterity affected and aspiration issues after--15 yrs ago     Past Surgical History:   Procedure Laterality Date   • CHOLECYSTECTOMY     • HIP FRACTURE SURGERY Left     1 1/2 yrs ago---has 2 large screws in place   • KYPHOPLASTY      1 1/2 yrs ago       SLP Recommendation and Plan  SLP Swallowing Diagnosis: oral dysphagia, suspected pharyngeal dysphagia (10/03/22 1500)  SLP Diet Recommendation: puree, nectar thick liquids (10/03/22 1500)  Recommended Precautions and Strategies: upright posture during/after eating, small bites of food and sips of liquid, alternate between small bites of food and sips of liquid, 1:1 supervision, assist with feeding (10/03/22 1500)  SLP Rec. for Method of Medication Administration: meds crushed, with pudding or applesauce, as tolerated (10/03/22 1500)     Monitor for Signs of Aspiration: yes, notify SLP if any concerns (10/03/22 1500)     Swallow Criteria for  Skilled Therapeutic Interventions Met: demonstrates skilled criteria (10/03/22 1500)  Anticipated Discharge Disposition (SLP): unknown (10/03/22 1500)  Rehab Potential/Prognosis, Swallowing: good, to achieve stated therapy goals (10/03/22 1500)  Therapy Frequency (Swallow): PRN (10/03/22 1500)  Predicted Duration Therapy Intervention (Days): until discharge (10/03/22 1500)                                     Plan of Care Reviewed With: patient  Outcome Evaluation: Clinical swallow eval completed. Recommend nectar thick and puree textures. Meds crushed with puree. Recommend upright, alert, slow rate, and 1:1 assist with PO intake. SLP to follow for diet tolerance and re-eval as warranted.      SWALLOW EVALUATION (last 72 hours)     SLP Adult Swallow Evaluation     Row Name 10/03/22 1500                   Rehab Evaluation    Document Type evaluation  -OC        Subjective Information no complaints  -OC        Patient Observations alert;cooperative;agree to therapy  pleasantly confused  -OC        Patient/Family/Caregiver Comments/Observations Patient not oriented to place/situiation this date  -OC        Patient Effort good  -OC        Symptoms Noted During/After Treatment none  -OC                  General Information    Patient Profile Reviewed yes  -OC        Pertinent History Of Current Problem Patient admitted with sigmoid diverticulitis, abdominal pain, liver lesion.  COPD, Hx CVA. Hx dysphagia and mechanical soft/nectar thick liquid diet- also hx of refusing thickened liquids.  -OC        Current Method of Nutrition NPO  -OC        Precautions/Limitations, Vision WFL  -OC        Precautions/Limitations, Hearing hearing impairment, bilaterally  -OC        Prior Level of Function-Communication unknown  -OC        Prior Level of Function-Swallowing mechanical soft textures;nectar thick liquids  -OC        Plans/Goals Discussed with patient;agreed upon  -OC        Barriers to Rehab none identified  -OC         Patient's Goals for Discharge patient could not state  -OC                  Pain    Additional Documentation Pain Scale: Numbers Pre/Post-Treatment (Group)  -OC                  Pain Scale: Numbers Pre/Post-Treatment    Pretreatment Pain Rating 0/10 - no pain  -OC        Posttreatment Pain Rating 0/10 - no pain  -OC                  Oral Motor Structure and Function    Dentition Assessment edentulous, dentures not available  -OC        Secretion Management WNL/WFL  -OC        Mucosal Quality moist, healthy  -OC        Volitional Swallow unable to elicit;other (see comments)  incomplete swallow  -OC        Volitional Cough weak  -OC                  Oral Musculature and Cranial Nerve Assessment    Oral Motor General Assessment generalized oral motor weakness  -OC                  Clinical Swallow Eval    Clinical Swallow Evaluation Summary Patient demonstrated unintelligible speech, rated to be 50% intelligible to unfamiliar listener. Patient demonstrated adequate acceptance ice chips and immediate wet coughing with ice chips. Patient demonstrated change in vocal quality with thin liquids. Patient demonstrated adequate acceptance nectar thick via cup and straw. No overt s/s aspiration with nectar thick liquids. Patient demonstrated timely swallow with puree, noted double swallow with puree. Patient demonstrated absent mastication mechanical soft and immediate swallow.  -OC                  SLP Evaluation Clinical Impression    SLP Swallowing Diagnosis oral dysphagia;suspected pharyngeal dysphagia  -OC        Functional Impact risk of aspiration/pneumonia  -OC        Rehab Potential/Prognosis, Swallowing good, to achieve stated therapy goals  -OC        Swallow Criteria for Skilled Therapeutic Interventions Met demonstrates skilled criteria  -OC                  Recommendations    Therapy Frequency (Swallow) PRN  -OC        Predicted Duration Therapy Intervention (Days) until discharge  -OC        SLP Diet  Recommendation puree;nectar thick liquids  -OC        Recommended Precautions and Strategies upright posture during/after eating;small bites of food and sips of liquid;alternate between small bites of food and sips of liquid;1:1 supervision;assist with feeding  -OC        Oral Care Recommendations Oral Care BID/PRN  -OC        SLP Rec. for Method of Medication Administration meds crushed;with pudding or applesauce;as tolerated  -OC        Monitor for Signs of Aspiration yes;notify SLP if any concerns  -OC        Anticipated Discharge Disposition (SLP) unknown  -OC                  Swallow Goals (SLP)    Swallow LTGs Swallow Long Term Goal (free text)  -OC                  (LTG) Swallow    (LTG) Swallow Tolerate least restrictive diet with no overt s/s aspiration.  -OC        [place] (Swallow Long Term Goal) with 1:1 assist/ supervision  -OC        Time Frame (Swallow Long Term Goal) by discharge  -OC              User Key  (r) = Recorded By, (t) = Taken By, (c) = Cosigned By    Initials Name Effective Dates    OC Karley Lynch MA,CCC-SLP 06/16/21 -                 EDUCATION  The patient has been educated in the following areas:   Dysphagia (Swallowing Impairment).        SLP GOALS     Row Name 10/03/22 1500             (STG) Swallow 1    (LTG) Swallow Tolerate least restrictive diet with no overt s/s aspiration.  -OC      [place] (Swallow Long Term Goal) with 1:1 assist/ supervision  -OC      Time Frame (Swallow Long Term Goal) by discharge  -OC            User Key  (r) = Recorded By, (t) = Taken By, (c) = Cosigned By    Initials Name Provider Type    Karley Robertson MA,CCC-SLP Speech and Language Pathologist                   Time Calculation:    Time Calculation- SLP     Row Name 10/03/22 1548             Time Calculation- SLP    SLP Start Time 1430  -OC      SLP Received On 10/03/22  -OC              Untimed Charges    SLP Eval/Re-eval  ST Eval Oral Pharyng Swallow - 09016  -OC      21520-VK Eval Oral  Pharyng Swallow Minutes 75  -OC              Total Minutes    Untimed Charges Total Minutes 75  -OC       Total Minutes 75  -OC            User Key  (r) = Recorded By, (t) = Taken By, (c) = Cosigned By    Initials Name Provider Type    Karley Robertson MA,CCC-SLP Speech and Language Pathologist                Therapy Charges for Today     Code Description Service Date Service Provider Modifiers Qty    [id number] HC ST EVAL ORAL PHARYNG SWALLOW 5 10/3/2022 Karley Lynch MA,CCC-SLP GN 1               Karley Lynch MA,SHAY-SLP  10/3/2022

## 2022-10-03 NOTE — PROGRESS NOTES
Name: Boni Vega ADMIT: 10/1/2022   : 1940  PCP: System, Provider Not In    MRN: [id number] LOS: 0 days   AGE/SEX: 81 y.o. male  ROOM: [facility]   Subjective   Chief Complaint   Patient presents with   • Abdominal Pain   • Vomiting     On ABX  NPO- to have ST eval  H/o stroke  To have repeat CT scan    ROS  No f/c  +n/v  No cp/palp  +soa/cough    Objective   Vital Signs  Temp:  [97.7 °F (36.5 °C)-98.4 °F (36.9 °C)] 98.3 °F (36.8 °C)  Heart Rate:  [] 92  Resp:  [16] 16  BP: ()/(62-76) 106/62  SpO2:  [90 %-94 %] 90 %  on  Flow (L/min):  [2] 2;   Device (Oxygen Therapy): nasal cannula  Body mass index is 24.28 kg/m².    Physical Exam  Constitutional:       General: He is in acute distress.      Appearance: He is ill-appearing.   HENT:      Head: Normocephalic and atraumatic.   Eyes:      General: No scleral icterus.  Cardiovascular:      Rate and Rhythm: Normal rate and regular rhythm.      Heart sounds: Normal heart sounds.   Pulmonary:      Effort: Respiratory distress present.      Breath sounds: Rhonchi present.   Abdominal:      General: There is no distension.      Palpations: Abdomen is soft.      Tenderness: There is abdominal tenderness (slight RUQ). There is no guarding.   Musculoskeletal:      Cervical back: Neck supple.   Skin:     Coloration: Skin is pale.   Neurological:      Mental Status: He is alert.     chronically ill  Poor memory    Results Review:       I reviewed the patient's new clinical results.  Results from last 7 days   Lab Units 10/03/22  0946 10/02/22  0525 10/01/22  1939   WBC 10*3/mm3 13.18* 17.59* 20.61*   HEMOGLOBIN g/dL 9.9* 12.4* 15.8   PLATELETS 10*3/mm3 115* 177 216     Results from last 7 days   Lab Units 10/03/22  0946 10/02/22  0525 10/01/22  1939   SODIUM mmol/L 139 139 138   POTASSIUM mmol/L 3.8 3.8 3.7   CHLORIDE mmol/L 106 106 102   CO2 mmol/L 25.4 23.2 26.0   BUN mg/dL 13 13 14   CREATININE mg/dL 0.62* 0.62* 0.63*   GLUCOSE mg/dL 102* 144* 167*    Estimated Creatinine Clearance: 103.7 mL/min (A) (by C-G formula based on SCr of 0.62 mg/dL (L)).  Results from last 7 days   Lab Units 10/03/22  0946 10/01/22  1939   ALBUMIN g/dL 2.80* 3.50   BILIRUBIN mg/dL 1.0 0.6   ALK PHOS U/L 74 77   AST (SGOT) U/L 177* 118*   ALT (SGPT) U/L 308* 93*     Results from last 7 days   Lab Units 10/03/22  0946 10/02/22  0525 10/01/22  1939   CALCIUM mg/dL 8.0* 7.7* 9.0   ALBUMIN g/dL 2.80*  --  3.50   MAGNESIUM mg/dL 1.6  --   --    PHOSPHORUS mg/dL 2.1*  --   --      Results from last 7 days   Lab Units 10/02/22  1614 10/02/22  1433 10/02/22  0857 10/02/22  0525 10/01/22  1956 10/01/22  1939   PROCALCITONIN ng/mL  --   --   --   --   --  0.09   LACTATE mmol/L 2.0 2.2* 2.4* 2.4*   < >  --     < > = values in this interval not displayed.       Coag     HbA1C No results found for: HGBA1C  Infection   Results from last 7 days   Lab Units 10/01/22  2024 10/01/22  1939   BLOODCX  No growth at 24 hours  No growth at 24 hours  --    PROCALCITONIN ng/mL  --  0.09     Radiology(recent) CT Abdomen With & Without Contrast    Result Date: 10/3/2022  There is increasing hemoperitoneum and hematoma identified today. The CT findings are most concerning for a hemorrhage hepatic lesion that has now ruptured. The underlying lesion may represent a hepatocellular carcinoma. There are additional lesions seen within the liver as described above. These findings were discussed by telephone with Dr. Vizcarra at the time of dictation.  Radiation dose reduction techniques were utilized, including automated exposure control and exposure modulation based on body size.       CT Abdomen Pelvis With Contrast    Result Date: 10/1/2022   1. Bibasilar consolidation may reflect atelectasis, although correlation with any evidence of pneumonia is recommended. 2. Left upper lobe pulmonary nodule, as well as enlarged left hilar lymph node. Malignancy cannot be excluded. 3. Apparent heterogeneous lesions within the  right lobe of the liver. Neoplasm versus abscess would be considerations. Full assessment of the liver lesions is severely limited due to streak artifact and motion artifact. Liver protocol CT or MRI could be considered for further assessment. 4. Diverticulosis, as well as a thick walled segment of sigmoid colon. This may reflect diverticulitis, although follow-up colonoscopy is suggested to exclude any underlying neoplasm. 5. Patient does have some free fluid tracking along the paracolic gutters bilaterally. There may be some additional free fluid around the liver and spleen.  Radiation dose reduction techniques were utilized, including automated exposure control and exposure modulation based on body size.  This report was finalized on 10/1/2022 10:42 PM by Dr. Soheila Mann M.D.      XR Chest 1 View    Result Date: 10/1/2022  Nonspecific bibasilar consolidation.  This report was finalized on 10/1/2022 11:00 PM by Dr. Soheila Mann M.D.      CT Angiogram Chest    Result Date: 10/1/2022   1. Bibasilar consolidation may reflect atelectasis, although correlation with any evidence of pneumonia is recommended. 2. Left upper lobe pulmonary nodule, as well as enlarged left hilar lymph node. Malignancy cannot be excluded. 3. Apparent heterogeneous lesions within the right lobe of the liver. Neoplasm versus abscess would be considerations. Full assessment of the liver lesions is severely limited due to streak artifact and motion artifact. Liver protocol CT or MRI could be considered for further assessment. 4. Diverticulosis, as well as a thick walled segment of sigmoid colon. This may reflect diverticulitis, although follow-up colonoscopy is suggested to exclude any underlying neoplasm. 5. Patient does have some free fluid tracking along the paracolic gutters bilaterally. There may be some additional free fluid around the liver and spleen.  Radiation dose reduction techniques were utilized, including automated  exposure control and exposure modulation based on body size.  This report was finalized on 10/1/2022 10:42 PM by Dr. Soheila Mann M.D.      Troponin T   Date Value Ref Range Status   10/01/2022 <0.010 0.000 - 0.030 ng/mL Final     No components found for: TSH;2    aspirin, 81 mg, Oral, Daily  cetirizine, 5 mg, Oral, Daily  DULoxetine, 60 mg, Oral, Daily  montelukast, 10 mg, Oral, Nightly  multivitamin with minerals, 1 tablet, Oral, Daily  [START ON 10/4/2022] pantoprazole, 40 mg, Oral, Q AM  piperacillin-tazobactam, 3.375 g, Intravenous, Q8H  sodium chloride, 10 mL, Intravenous, Q12H  tamsulosin, 0.4 mg, Oral, Nightly  vancomycin, 1,000 mg, Intravenous, Q12H      Pharmacy Consult - Pharmacy to dose,   Pharmacy to dose vancomycin,   sodium chloride, 100 mL/hr, Last Rate: 100 mL/hr (10/03/22 0557)    NPO Diet NPO Type: Ice Chips, Sips with Meds      Assessment & Plan      Active Hospital Problems    Diagnosis  POA   • **Sigmoid diverticulitis [K57.32]  Unknown   • COPD (chronic obstructive pulmonary disease) ([facility]) [J44.9]  Unknown   • Benign essential tremor [G25.0]  Unknown   • 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       Patient is a 81-year-old male with a history of essential tremor status post deep brain stimulator, CVA complicated with history of aspiration, COPD, essential hypertension, degenerative disc disease status post back surgery, GERD, presented to the ED complains of abdominal pain for 2 days.  Diagnosed with sigmoid diverticulitis and liver lesion..        Sigmoid diverticulitis: WBC was 20.61 on admission.  Lactic acid elevated at 2.5.  Afebrile.  on vancomycin and  zosyn, monitor daily CBC.     Liver lesion: CT findings large lesion in the liver, mass versus abscess. Repeat CT scan with hemoperitoneum and hematoma. Continue antibiotics as above.  GI and General surgery following, appreciate recs. Monitor H/H closely, hold lovenox. May need transfusion if Hgb continues to drop.         Mild hypoxemia/COPD.  CT angiogram of the chest showed bibasilar consolidation may reflect atelectasis versus pneumonia.  On antibiotics as above.  N.p.o. currently, speech consulted for evaluation of possible aspiration especially in the setting of history of aspiration pneumonia.  Antibiotics as above.  Incentive parameter:.  As needed albuterol.  Wean off O2 as tolerated.     History of CVA complicated by aspiration: N.p.o., speech therapy to evaluate.        Left upper lobe pulmonary nodule, as well as enlarged left hilar lymph node.  CT chest showed The patient has a nodule within the left upper lobe, which measures up to 1.5 cm. There is also an enlarged left hilar node, which measures up to 2.3 x1.5 cm Malignancy cannot be excluded.  Pulmonology following, consideration of BX once abdominal issues improved.     Essential hypertension: blood pressure stable off BP meds, monitor.     Essential tremor status post deep brain stimulator:      Chronic neck pain/degenerative disc disease status post low back surgery: Per daughter received steroid injections to the neck, pain much improved afterwards.  Continue as needed pain medication.     NICOLAS RN and Dr. Buckley     VTE Prophylaxis - SCDs, lovenox on hold with bleed        Bryant Huber MD  [facility]  10/03/22  08:05 EDT

## 2022-10-03 NOTE — PROGRESS NOTES
[facility]     Mar/chart reviewed  Follow up left hilar lymphadenopathy and left lung nodule, emphysema  So increased cough or wheezing, but he does have some wet cough    Vital Sign Min/Max for last 24 hours  Temp  Min: 97.5 °F (36.4 °C)  Max: 98.4 °F (36.9 °C)   BP  Min: 99/71  Max: 122/73   Pulse  Min: 77  Max: 107   Resp  Min: 16  Max: 18   SpO2  Min: 90 %  Max: 95 %   Flow (L/min)  Min: 2  Max: 2   Weight  Min: 81.2 kg (179 lb 0.2 oz)  Max: 81.2 kg (179 lb 0.2 oz)     Appears ill, axox3,   perrl, eomi, normal sclera  mmm, no jvd, trachea midline, neck supple,  chest decreased ae bilaterally, no crackles, no wheezes,   rrr,   soft, nt, nd +bs,  no c/c/ e  Skin warm, dry no rashes    Labs: 10/3: reviewed:  Nothing new    Ct chest: reviewed    A/P:  1. COPD with extensive emphysema  2. Left upper lobe lung nodule and hilar lymphadenopathy - looks like it will be a difficult biopsy. Defer until GI issues are resolved  3. Hypoxemia   4. Sigmoid diverticulitis  5. Liver lesion    Patient is new to me today

## 2022-10-03 NOTE — PLAN OF CARE
Goal Outcome Evaluation:  Plan of Care Reviewed With: patient           Outcome Evaluation: Clinical swallow eval completed. Recommend nectar thick and puree textures. Meds crushed with puree. Recommend upright, alert, slow rate, and 1:1 assist with PO intake. SLP to follow for diet tolerance and re-eval as warranted.

## 2022-10-03 NOTE — CONSULTS
Patient Identification:  Boni Vega  81 y.o.  male  1940  [id number]          LOS 0    Requesting physician: Dr Gomez    Reason for Consultation: Lung nodule    History of Present Illness:     81-year-old admitted by hospitalist service after presenting with abdominal pain and vomiting.  Imaging shows left upper lobe nodule and hilar lymphadenopathy as well as concerning liver lesion.  Patient has history of COPD and was a previous smoker.  Quit smoking October 2021.  No productive cough, chest pain or hemoptysis.  No unintentional weight loss.  Positive nausea and vomiting.    Past Medical History:  Past Medical History:   Diagnosis Date   • COPD (chronic obstructive pulmonary disease) (HCC)    • S/P deep brain stimulator placement     essential tremors--15 yrs ago   • Stroke (HCC)     left hand dexterity affected and aspiration issues after--15 yrs ago       Past Surgical History:  Past Surgical History:   Procedure Laterality Date   • CHOLECYSTECTOMY     • HIP FRACTURE SURGERY Left     1 1/2 yrs ago---has 2 large screws in place   • KYPHOPLASTY      1 1/2 yrs ago        Home Meds:  Medications Prior to Admission   Medication Sig Dispense Refill Last Dose   • aspirin 81 MG chewable tablet Chew 81 mg Daily.   10/1/2022 at Unknown time   • DULoxetine (CYMBALTA) 60 MG capsule Take 60 mg by mouth Daily.   10/1/2022 at Unknown time   • lisinopril (PRINIVIL,ZESTRIL) 20 MG tablet Take 20 mg by mouth Daily.   10/1/2022 at Unknown time   • loratadine (Claritin) 10 MG tablet Take 10 mg by mouth Daily.   10/1/2022 at Unknown time   • montelukast (SINGULAIR) 10 MG tablet Take 10 mg by mouth Every Night.   10/1/2022 at Unknown time   • multivitamin with minerals (MULTIVITAMIN ADULT PO) Take 1 tablet by mouth Daily.   10/1/2022 at Unknown time   • pantoprazole (PROTONIX) 20 MG EC tablet Take 20 mg by mouth Daily.   10/1/2022 at Unknown time   • tamsulosin (FLOMAX) 0.4 MG capsule 24 hr capsule Take 1 capsule by  "mouth Every Night.   10/1/2022 at Unknown time         Allergies:  No Known Allergies    Social History:   Social History     Socioeconomic History   • Marital status: Single   Tobacco Use   • Smoking status: Former Smoker     Types: Cigarettes     Quit date: 10/1/2021     Years since quittin.0   • Smokeless tobacco: Never Used   Substance and Sexual Activity   • Alcohol use: Never   • Drug use: Never   • Sexual activity: Defer       Family History:  History reviewed. No pertinent family history.    Review of Systems:  Denies fevers or chills  Positive for nausea and vomiting  No new vision or hearing changes  No chest pain  No productive cough or shortness of breath  No hematemesis or hematochezia, no dysuria or frequency  No musculoskeletal complaints  No heat or cold intolerance  No skin rashes  No dizziness or confusion.  No seizure activity  No new anxiety or depression  12 system review of systems performed and all else negative    Objective:    PHYSICAL EXAM:    /73 (BP Location: Left arm, Patient Position: Lying)   Pulse 84   Temp 97.7 °F (36.5 °C) (Oral)   Resp 16   Ht 182.9 cm (72\")   Wt 81.2 kg (179 lb 0.2 oz)   SpO2 94%   BMI 24.28 kg/m²  Body mass index is 24.28 kg/m². 94% 81.2 kg (179 lb 0.2 oz)    GENERAL APPEARANCE:   · Well developed  · Well nourished  · No acute distress   EYES:    · PERRL                                                                           · Conjunctivae normal  · Sclerae nonicteric.  HENT:   · Atraumatic, normocephalic  · External ears and nose normal  · Moist mucous membranes and no ulcers  NECK:  · Thyroid not enlarged  · Trachea midline   RESPIRATORY:    · Nonlabored breathing   · Diminished basilar breath sounds  · No rales. No wheezing  · No dullness  CARDIOVASCULAR:    · RRR  · Normal S1, S2  · No murmur  · Lower extremity edema: none    GI:   · Bowel sounds normal  · Abdomen soft , nondistended, nontender  · No abdominal " masses  MUSCULOSKELETAL:  · Normal movement of extremities  · No tenderness, no deformities  · No clubbing or cyanosis   Skin:    · No visible rashes  · No palpable nodules  · Cap refill normal.  No mottling.   PSYCHIATRIC:  · Speech and behavior appropriate  · Normal mood and affect  · Oriented to person, place and time  NEUROLOGIC:  Cranial nerves II through XII grossly intact.  Sensation intact.      Lab Review:                                    Results    Collected Updated Procedure Result Status    10/02/2022 1614 10/02/2022 1642 STAT Lactic Acid, Reflex [[id number]]   Blood    Final result Component Value Units   Lactate 2.0 mmol/L          10/02/2022 1433 10/02/2022 1501 STAT Lactic Acid, Reflex [[id number]]   (Abnormal)   Blood    Final result Component Value Units   Lactate 2.2 High Critical  mmol/L          10/02/2022 0857 10/02/2022 0930 STAT Lactic Acid, Reflex [[id number]]   (Abnormal)   Blood    Final result Component Value Units   Lactate 2.4 High Critical  mmol/L          10/02/2022 0525 10/02/2022 0557 STAT Lactic Acid, Reflex [[id number]]   (Abnormal)   Blood    Final result Component Value Units   Lactate 2.4 High Critical  mmol/L          10/02/2022 0525 10/02/2022 0557 Basic Metabolic Panel [[id number]]    (Abnormal)   Blood    Final result Component Value Units   Glucose 144 High  mg/dL   BUN 13 mg/dL   Creatinine 0.62 Low  mg/dL   Sodium 139 mmol/L   Potassium 3.8  mmol/L   Chloride 106 mmol/L   CO2 23.2 mmol/L   Calcium 7.7 Low  mg/dL   BUN/Creatinine Ratio 21.0    Anion Gap 9.8 mmol/L   eGFR 94.8  mL/min/1.73          10/02/2022 0525 10/02/2022 0552 CBC (No Diff) [[id number]]   (Abnormal)   Blood    Final result Component Value Units   WBC 17.59 High  10*3/mm3   RBC 3.95 Low  10*6/mm3   Hemoglobin 12.4 Low  g/dL   Hematocrit 36.2 Low  %   MCV 91.6 fL   MCH 31.4 pg   MCHC 34.3 g/dL   RDW 12.0 Low  %   RDW-SD 40.8 fl   MPV 10.4 fL   Platelets 177 10*3/mm3          10/01/2022 2352 10/02/2022 0030  STAT Lactic Acid, Reflex [[id number]]   (Abnormal)   Blood    Final result Component Value Units   Lactate 3.4 High Critical  mmol/L          10/01/2022 2034 10/01/2022 2100 Urinalysis With Microscopic If Indicated (No Culture) - Urine, Clean Catch [[id number]]    Urine, Clean Catch    Final result Component Value   Color, UA Yellow   Appearance, UA Clear   pH, UA 7.0   Specific Gravity, UA 1.016   Glucose, UA Negative   Ketones, UA Negative   Bilirubin, UA Negative   Blood, UA Negative   Protein, UA Negative   Leuk Esterase, UA Negative   Nitrite, UA Negative   Urobilinogen, UA 1.0 E.U./dL          10/01/2022 2024 10/01/2022 2044 Blood Culture - Blood, Arm, Right [[id number]]   Blood from Arm, Right    In process Component Value   No component results             10/01/2022 2024 10/01/2022 2044 Blood Culture - Blood, Arm, Left [[id number]]   Blood from Arm, Left    In process Component Value   No component results             10/01/2022 1956 10/01/2022 2033 Lactic Acid, Plasma [[id number]]   (Abnormal)   Blood    Final result Component Value Units   Lactate 2.5 High Critical  mmol/L          10/01/2022 1939 10/01/2022 [address] Draw [[id number]]    Blood    Final result Component Value   No component results          10/01/2022 1939 10/01/2022 2004 Comprehensive Metabolic Panel [[id number]]    (Abnormal)   Blood    Final result Component Value Units   Glucose 167 High  mg/dL   BUN 14 mg/dL   Creatinine 0.63 Low  mg/dL   Sodium 138 mmol/L   Potassium 3.7  mmol/L   Chloride 102 mmol/L   CO2 26.0 mmol/L   Calcium 9.0 mg/dL   Total Protein 6.2 g/dL   Albumin 3.50 g/dL   ALT (SGPT) 93 High  U/L   AST (SGOT) 118 High  U/L   Alkaline Phosphatase 77 U/L   Total Bilirubin 0.6 mg/dL   Globulin 2.7 gm/dL   A/G Ratio 1.3 g/dL   BUN/Creatinine Ratio 22.2    Anion Gap 10.0 mmol/L   eGFR 94.4  mL/min/1.73          10/01/2022 1939 10/01/2022 2004 Lipase [[id number]]   Blood    Final result Component Value Units   Lipase 13 U/L           10/01/2022 1939 10/01/2022 1949 CBC Auto Differential [[id number]]   (Abnormal)   Blood    Final result Component Value Units   WBC 20.61 High  10*3/mm3   RBC 5.02 10*6/mm3   Hemoglobin 15.8 g/dL   Hematocrit 45.4 %   MCV 90.4 fL   MCH 31.5 pg   MCHC 34.8 g/dL   RDW 12.0 Low  %   RDW-SD 40.3 fl   MPV 10.2 fL   Platelets 216 10*3/mm3   Neutrophil % 78.0 High  %   Lymphocyte % 15.1 Low  %   Monocyte % 5.6 %   Eosinophil % 0.4 %   Basophil % 0.4 %   Immature Grans % 0.5 %   Neutrophils, Absolute 16.07 High  10*3/mm3   Lymphocytes, Absolute 3.11 High  10*3/mm3   Monocytes, Absolute 1.15 High  10*3/mm3   Eosinophils, Absolute 0.09 10*3/mm3   Basophils, Absolute 0.09 10*3/mm3   Immature Grans, Absolute 0.10 High  10*3/mm3   nRBC 0.0 /100 WBC          10/01/2022 1939 10/01/2022 2050 Procalcitonin [[id number]]    Blood    Final result Component Value Units   Procalcitonin 0.09 ng/mL          10/01/2022 1939 10/01/2022 2050 BNP [[id number]]    Blood    Final result Component Value Units   proBNP 206.0 pg/mL          10/01/2022 1939 10/01/2022 2050 Troponin [[id number]]    Blood    Final result Component Value Units   Troponin T <0.010 ng/mL                   Imaging reviewed  chest X-ray and CT chest reviewed             Assessment:    Left upper lung nodule and hilar lymphadenopathy  COPD with diffuse emphysema  Hypoxemia  Sigmoid diverticulitis  Liver lesion            Recommendations:    On broad-spectrum antibiotics for her diverticulitis.  Being followed by GI and general surgery.  What abdominal issues have resolved would consider further evaluation of lung nodule and lymphadenopathy.  Concerning for potential neoplastic process.  We will follow for pulmonary issues.  Wean oxygen as able.  Will have bronchodilators available for as needed use but does not use inhaled medications at home previously.    Thank you for allowing me to participate in the care of this patient.  I will continue to follow along with  you.      Tej Bowman MD  [facility], [facility]  Pulmonary and Critical Care Medicine    10/2/2022  20:03 EDT

## 2022-10-03 NOTE — PROGRESS NOTES
"[facility] Clinical Pharmacy Services: Vancomycin Monitoring Note    Boni Vega is a 81 y.o. male who is on day 2 of pharmacy to dose vancomycin for Sepsis.    Previous Vancomycin Dose:   1000 mg IV every  12  hours  Updated Cultures and Sensitivities: 10/1 BCx x2 no growth x 24h   Results from last 7 days   Lab Units 10/03/22  0946   VANCOMYCIN TR mcg/mL 12.80     Vitals/Labs  Ht: 182.9 cm (72\"); Wt: 81.2 kg (179 lb 0.2 oz)   Temp Readings from Last 1 Encounters:   10/03/22 98 °F (36.7 °C) (Oral)     Estimated Creatinine Clearance: 103.7 mL/min (A) (by C-G formula based on SCr of 0.62 mg/dL (L)).       Results from last 7 days   Lab Units 10/03/22  0946 10/02/22  0525 10/01/22  1939   CREATININE mg/dL 0.62* 0.62* 0.63*   WBC 10*3/mm3 13.18* 17.59* 20.61*     Assessment/Plan    Current Vancomycin Dose: 1000 mg IV every  12  hours; provides a predicted  mg/L.hr   Next Level Date and Time: Vanc Trough on 10/5 at 0930 or sooner if patient status acutely changes  We will continue to monitor patient changes and renal function     Thank you for involving pharmacy in this patient's care. Please contact pharmacy with any questions or concerns.       Chris Ramachandran, [facility]  Clinical Pharmacist          "

## 2022-10-04 LAB
ALBUMIN SERPL-MCNC: 2.6 G/DL (ref 3.5–5.2)
ALBUMIN/GLOB SERPL: 1.1 G/DL
ALP SERPL-CCNC: 70 U/L (ref 39–117)
ALT SERPL W P-5'-P-CCNC: 186 U/L (ref 1–41)
ANION GAP SERPL CALCULATED.3IONS-SCNC: 8.6 MMOL/L (ref 5–15)
AST SERPL-CCNC: 79 U/L (ref 1–40)
BILIRUB SERPL-MCNC: 1.2 MG/DL (ref 0–1.2)
BUN SERPL-MCNC: 8 MG/DL (ref 8–23)
BUN/CREAT SERPL: 16.7 (ref 7–25)
CALCIUM SPEC-SCNC: 7.9 MG/DL (ref 8.6–10.5)
CHLORIDE SERPL-SCNC: 107 MMOL/L (ref 98–107)
CO2 SERPL-SCNC: 24.4 MMOL/L (ref 22–29)
CREAT SERPL-MCNC: 0.48 MG/DL (ref 0.76–1.27)
DEPRECATED RDW RBC AUTO: 42.2 FL (ref 37–54)
EGFRCR SERPLBLD CKD-EPI 2021: 102.5 ML/MIN/1.73
ERYTHROCYTE [DISTWIDTH] IN BLOOD BY AUTOMATED COUNT: 12.3 % (ref 12.3–15.4)
GLOBULIN UR ELPH-MCNC: 2.3 GM/DL
GLUCOSE SERPL-MCNC: 103 MG/DL (ref 65–99)
HCT VFR BLD AUTO: 24.9 % (ref 37.5–51)
HCT VFR BLD AUTO: 25.2 % (ref 37.5–51)
HCT VFR BLD AUTO: 25.8 % (ref 37.5–51)
HCT VFR BLD AUTO: 25.9 % (ref 37.5–51)
HCT VFR BLD AUTO: 27.5 % (ref 37.5–51)
HGB BLD-MCNC: 8.5 G/DL (ref 13–17.7)
HGB BLD-MCNC: 8.7 G/DL (ref 13–17.7)
HGB BLD-MCNC: 8.8 G/DL (ref 13–17.7)
HGB BLD-MCNC: 8.9 G/DL (ref 13–17.7)
HGB BLD-MCNC: 9.7 G/DL (ref 13–17.7)
MCH RBC QN AUTO: 31.8 PG (ref 26.6–33)
MCHC RBC AUTO-ENTMCNC: 34 G/DL (ref 31.5–35.7)
MCV RBC AUTO: 93.5 FL (ref 79–97)
PHOSPHATE SERPL-MCNC: 1.5 MG/DL (ref 2.5–4.5)
PLATELET # BLD AUTO: 98 10*3/MM3 (ref 140–450)
PMV BLD AUTO: 11 FL (ref 6–12)
POTASSIUM SERPL-SCNC: 3.5 MMOL/L (ref 3.5–5.2)
PROT SERPL-MCNC: 4.9 G/DL (ref 6–8.5)
QT INTERVAL: 431 MS
RBC # BLD AUTO: 2.77 10*6/MM3 (ref 4.14–5.8)
SODIUM SERPL-SCNC: 140 MMOL/L (ref 136–145)
TROPONIN T SERPL-MCNC: <0.01 NG/ML (ref 0–0.03)
WBC NRBC COR # BLD: 12.74 10*3/MM3 (ref 3.4–10.8)

## 2022-10-04 PROCEDURE — 93005 ELECTROCARDIOGRAM TRACING: CPT | Performed by: INTERNAL MEDICINE

## 2022-10-04 PROCEDURE — 94640 AIRWAY INHALATION TREATMENT: CPT

## 2022-10-04 PROCEDURE — 84484 ASSAY OF TROPONIN QUANT: CPT | Performed by: INTERNAL MEDICINE

## 2022-10-04 PROCEDURE — 99232 SBSQ HOSP IP/OBS MODERATE 35: CPT | Performed by: INTERNAL MEDICINE

## 2022-10-04 PROCEDURE — 25010000002 VANCOMYCIN PER 500 MG: Performed by: NURSE PRACTITIONER

## 2022-10-04 PROCEDURE — 99231 SBSQ HOSP IP/OBS SF/LOW 25: CPT | Performed by: SURGERY

## 2022-10-04 PROCEDURE — 93010 ELECTROCARDIOGRAM REPORT: CPT | Performed by: INTERNAL MEDICINE

## 2022-10-04 PROCEDURE — 94799 UNLISTED PULMONARY SVC/PX: CPT

## 2022-10-04 PROCEDURE — 85014 HEMATOCRIT: CPT | Performed by: INTERNAL MEDICINE

## 2022-10-04 PROCEDURE — 80053 COMPREHEN METABOLIC PANEL: CPT | Performed by: INTERNAL MEDICINE

## 2022-10-04 PROCEDURE — 25010000002 PIPERACILLIN SOD-TAZOBACTAM PER 1 G: Performed by: STUDENT IN AN ORGANIZED HEALTH CARE EDUCATION/TRAINING PROGRAM

## 2022-10-04 PROCEDURE — 84100 ASSAY OF PHOSPHORUS: CPT | Performed by: INTERNAL MEDICINE

## 2022-10-04 PROCEDURE — 85018 HEMOGLOBIN: CPT | Performed by: INTERNAL MEDICINE

## 2022-10-04 PROCEDURE — 25010000002 VANCOMYCIN 10 G RECONSTITUTED SOLUTION: Performed by: INTERNAL MEDICINE

## 2022-10-04 PROCEDURE — 85027 COMPLETE CBC AUTOMATED: CPT | Performed by: INTERNAL MEDICINE

## 2022-10-04 RX ORDER — IPRATROPIUM BROMIDE AND ALBUTEROL SULFATE 2.5; .5 MG/3ML; MG/3ML
3 SOLUTION RESPIRATORY (INHALATION)
Status: DISCONTINUED | OUTPATIENT
Start: 2022-10-04 | End: 2022-10-13

## 2022-10-04 RX ADMIN — TAMSULOSIN HYDROCHLORIDE 0.4 MG: 0.4 CAPSULE ORAL at 20:05

## 2022-10-04 RX ADMIN — ACETAMINOPHEN 650 MG: 325 TABLET, FILM COATED ORAL at 05:27

## 2022-10-04 RX ADMIN — TAZOBACTAM SODIUM AND PIPERACILLIN SODIUM 3.38 G: 375; 3 INJECTION, SOLUTION INTRAVENOUS at 04:45

## 2022-10-04 RX ADMIN — SODIUM CHLORIDE 100 ML/HR: 9 INJECTION, SOLUTION INTRAVENOUS at 07:37

## 2022-10-04 RX ADMIN — TAZOBACTAM SODIUM AND PIPERACILLIN SODIUM 3.38 G: 375; 3 INJECTION, SOLUTION INTRAVENOUS at 20:05

## 2022-10-04 RX ADMIN — PANTOPRAZOLE SODIUM 40 MG: 40 TABLET, DELAYED RELEASE ORAL at 05:27

## 2022-10-04 RX ADMIN — IPRATROPIUM BROMIDE AND ALBUTEROL SULFATE 3 ML: 2.5; .5 SOLUTION RESPIRATORY (INHALATION) at 16:15

## 2022-10-04 RX ADMIN — Medication 10 ML: at 12:29

## 2022-10-04 RX ADMIN — VANCOMYCIN HYDROCHLORIDE 1000 MG: 1 INJECTION, SOLUTION INTRAVENOUS at 00:32

## 2022-10-04 RX ADMIN — Medication 10 ML: at 20:09

## 2022-10-04 RX ADMIN — IPRATROPIUM BROMIDE AND ALBUTEROL SULFATE 3 ML: 2.5; .5 SOLUTION RESPIRATORY (INHALATION) at 19:21

## 2022-10-04 RX ADMIN — POTASSIUM PHOSPHATE, MONOBASIC AND POTASSIUM PHOSPHATE, DIBASIC 30 MMOL: 224; 236 INJECTION, SOLUTION, CONCENTRATE INTRAVENOUS at 12:29

## 2022-10-04 RX ADMIN — DULOXETINE HYDROCHLORIDE 60 MG: 60 CAPSULE, DELAYED RELEASE ORAL at 09:48

## 2022-10-04 RX ADMIN — MONTELUKAST SODIUM 10 MG: 10 TABLET, FILM COATED ORAL at 20:05

## 2022-10-04 RX ADMIN — OXYCODONE AND ACETAMINOPHEN 1 TABLET: 5; 325 TABLET ORAL at 09:48

## 2022-10-04 RX ADMIN — MULTIPLE VITAMINS W/ MINERALS TAB 1 TABLET: TAB at 14:50

## 2022-10-04 RX ADMIN — VANCOMYCIN HYDROCHLORIDE 1250 MG: 10 INJECTION, POWDER, LYOPHILIZED, FOR SOLUTION INTRAVENOUS at 09:49

## 2022-10-04 RX ADMIN — CETIRIZINE HYDROCHLORIDE 5 MG: 10 TABLET ORAL at 14:50

## 2022-10-04 RX ADMIN — TAZOBACTAM SODIUM AND PIPERACILLIN SODIUM 3.38 G: 375; 3 INJECTION, SOLUTION INTRAVENOUS at 12:29

## 2022-10-04 RX ADMIN — IPRATROPIUM BROMIDE AND ALBUTEROL SULFATE 3 ML: 2.5; .5 SOLUTION RESPIRATORY (INHALATION) at 11:55

## 2022-10-04 RX ADMIN — VANCOMYCIN HYDROCHLORIDE 1250 MG: 10 INJECTION, POWDER, LYOPHILIZED, FOR SOLUTION INTRAVENOUS at 22:25

## 2022-10-04 NOTE — PLAN OF CARE
Goal Outcome Evaluation:           Progress: no change  Outcome Evaluation: Pts VSS on 3L NC this shift. Pt. turned Q2 this shift d/t redness developing on pts backside. Pt. advanced to puree w/ nectar/syrup thick liquids again per GI. Pts. phosphorus 1.5 this AM and was replaced via IV infusuon this shift. Will continue to monitor.

## 2022-10-04 NOTE — PROGRESS NOTES
Chief Complaint:    Liver mass and diverticulitis    Subjective:    The patient is clinically stable with abdominal pain.  He had a CT scan of the abdomen performed yesterday with liver protocol that shows an apparent mass in the liver with hemorrhage.  There is some degree of hemoperitoneum.    Objective:    Temp:  [97.7 °F (36.5 °C)-98.1 °F (36.7 °C)] 98 °F (36.7 °C)  Heart Rate:  [] 83  Resp:  [16-22] 16  BP: (121-154)/(77-89) 154/88    Physical Exam  Constitutional:       General: He is awake.      Appearance: He is ill-appearing. He is not toxic-appearing.   Abdominal:      Palpations: Abdomen is soft.      Tenderness: There is generalized abdominal tenderness (Mildly tender).   Psychiatric:         Behavior: Behavior is cooperative.         Results:    H/H is 9.7/27.5.    Assessment/Plan:    The patient has acute diverticulitis that is being treated with antibiotics.  It is expected to resolve with conservative management.    The patient has a mass in the liver that has evidence of bleeding.  He is hemodynamically stable and his H/H is stable.  The bleeding does not appear to be clinically significant.  The clinical situation is difficult for this patient because he is frail and in poor health and not a surgical candidate.  The options for management are either a biopsy of the liver mass for definitive diagnosis with potential chemotherapy or consideration for palliative care.      If aggressive management is pursued, he may need transfer to [facility] where he can be seen by a liver surgeon to help direct management of the bleeding which may require embolization if it becomes more significant.    Edward Meyers Jr., M.D.

## 2022-10-04 NOTE — PROGRESS NOTES
[facility]      Mar/chart reviewed  Follow up left hilar lymphadenopathy and left lung nodule, emphysema  Continue wet cough, says that is not usual for him    Vital Sign Min/Max for last 24 hours  Temp  Min: 97.7 °F (36.5 °C)  Max: 98.1 °F (36.7 °C)   BP  Min: 121/87  Max: 154/88   Pulse  Min: 83  Max: 103   Resp  Min: 16  Max: 22   SpO2  Min: 92 %  Max: 96 %   Flow (L/min)  Min: 3  Max: 3   No data recorded     Appears ill, sleepy arouses, mumbles some but mainly appropriate  perrl, eomi, normal sclera  mmm, no jvd, trachea midline, neck supple,  chest decreased slightly coarse ae bilaterally, no crackles,   no wheezes,   Tachy, regular  soft, tender, slightly distended, firm ruq +bs,  no c/c/  trace edema  Skin warm, dry no rashes    Labs: 10/4: Reviewed:  Wbc 12.7  hgb 8.8  plts 98  Cr 0.48  Bicarb 24  Total protein 4.9  Albumin 2.6    A/P:  1. COPD with extensive emphysema --add nebs and flutter valve  2. Left upper lobe lung nodule and hilar lymphadenopathy - looks like it will be a difficult biopsy. Defer until GI issues are resolved  3. Hypoxemia   4. Sigmoid diverticulitis  5. Liver lesion--with hemorrhage possible malignant?

## 2022-10-04 NOTE — PROGRESS NOTES
Name: Boni Vega ADMIT: 10/1/2022   : 1940  PCP: System, Provider Not In    MRN: [id number] LOS: 1 days   AGE/SEX: 81 y.o. male  ROOM: [facility]     Subjective   Subjective   CC: abdominal pain  No acute events. Patient has no new complaints. Abdominal pain is well-controlled. Taking PO. No CP/dyspnea/f/c/n/v/d.    Objective   Objective   Vital Signs  Temp:  [97.7 °F (36.5 °C)-98.1 °F (36.7 °C)] 98 °F (36.7 °C)  Heart Rate:  [] 84  Resp:  [16-22] 20  BP: (121-162)/(77-92) 162/92  SpO2:  [92 %-98 %] 98 %  on  Flow (L/min):  [3] 3;   Device (Oxygen Therapy): nasal cannula  Body mass index is 24.28 kg/m².  Physical Exam  Vitals and nursing note reviewed.   Constitutional:       General: He is not in acute distress.     Appearance: He is ill-appearing. He is not toxic-appearing or diaphoretic.   HENT:      Head: Normocephalic and atraumatic.      Nose: Nose normal.      Mouth/Throat:      Mouth: Mucous membranes are moist.      Pharynx: Oropharynx is clear.   Eyes:      Conjunctiva/sclera: Conjunctivae normal.      Pupils: Pupils are equal, round, and reactive to light.   Cardiovascular:      Rate and Rhythm: Normal rate and regular rhythm.      Pulses: Normal pulses.   Pulmonary:      Effort: Pulmonary effort is normal.      Breath sounds: Examination of the right-lower field reveals decreased breath sounds. Examination of the left-lower field reveals decreased breath sounds. Decreased breath sounds present.   Abdominal:      General: Bowel sounds are normal.      Palpations: Abdomen is soft.      Tenderness: There is abdominal tenderness (mild, diffuse). There is no guarding or rebound.   Musculoskeletal:         General: No swelling or tenderness.      Cervical back: Normal range of motion and neck supple.   Skin:     General: Skin is warm and dry.      Capillary Refill: Capillary refill takes less than 2 seconds.   Neurological:      General: No focal deficit present.      Mental Status: He is  alert.   Psychiatric:         Mood and Affect: Mood normal.         Behavior: Behavior normal.         Cognition and Memory: Cognition is impaired. Memory is impaired.       Results Review     I reviewed the patient's new clinical results.  I reviewed the patient's telemetry.  I reviewed the patient's CT abdomen/pelvis.  Results from last 7 days   Lab Units 10/04/22  0739 10/04/22  0641 10/04/22  0010 10/03/22  1822 10/03/22  0946 10/02/22  0525 10/01/22  1939   WBC 10*3/mm3  --  12.74*  --   --  13.18* 17.59* 20.61*   HEMOGLOBIN g/dL 9.7* 8.8* 8.5* 9.8* 9.9* 12.4* 15.8   PLATELETS 10*3/mm3  --  98*  --   --  115* 177 216     Results from last 7 days   Lab Units 10/04/22  0641 10/03/22  0946 10/02/22  0525 10/01/22  1939   SODIUM mmol/L 140 139 139 138   POTASSIUM mmol/L 3.5 3.8 3.8 3.7   CHLORIDE mmol/L 107 106 106 102   CO2 mmol/L 24.4 25.4 23.2 26.0   BUN mg/dL 8 13 13 14   CREATININE mg/dL 0.48* 0.62* 0.62* 0.63*   GLUCOSE mg/dL 103* 102* 144* 167*   EGFR mL/min/1.73 102.5 94.8 94.8 94.4     Results from last 7 days   Lab Units 10/04/22  0641 10/03/22  0946 10/01/22  1939   ALBUMIN g/dL 2.60* 2.80* 3.50   BILIRUBIN mg/dL 1.2 1.0 0.6   ALK PHOS U/L 70 74 77   AST (SGOT) U/L 79* 177* 118*   ALT (SGPT) U/L 186* 308* 93*     Results from last 7 days   Lab Units 10/04/22  0641 10/03/22  0946 10/02/22  0525 10/01/22  1939   CALCIUM mg/dL 7.9* 8.0* 7.7* 9.0   ALBUMIN g/dL 2.60* 2.80*  --  3.50   MAGNESIUM mg/dL  --  1.6  --   --    PHOSPHORUS mg/dL 1.5* 2.1*  --   --      Results from last 7 days   Lab Units 10/02/22  1614 10/02/22  1433 10/02/22  0857 10/02/22  0525 10/01/22  1956 10/01/22  1939   PROCALCITONIN ng/mL  --   --   --   --   --  0.09   LACTATE mmol/L 2.0 2.2* 2.4* 2.4*   < >  --     < > = values in this interval not displayed.     No results found for: HGBA1C, POCGLU    CT Abdomen With & Without Contrast    Result Date: 10/3/2022  There is increasing hemoperitoneum and hematoma identified today. The CT  findings are most concerning for a hemorrhage hepatic lesion that has now ruptured. The underlying lesion may represent a hepatocellular carcinoma. There are additional lesions seen within the liver as described above. These findings were discussed by telephone with Dr. Vizcarra at the time of dictation.  Radiation dose reduction techniques were utilized, including automated exposure control and exposure modulation based on body size.       Scheduled Medications  cetirizine, 5 mg, Oral, Daily  DULoxetine, 60 mg, Oral, Daily  ipratropium-albuterol, 3 mL, Nebulization, 4x Daily - RT  montelukast, 10 mg, Oral, Nightly  multivitamin with minerals, 1 tablet, Oral, Daily  pantoprazole, 40 mg, Oral, Q AM  piperacillin-tazobactam, 3.375 g, Intravenous, Q8H  sodium chloride, 10 mL, Intravenous, Q12H  tamsulosin, 0.4 mg, Oral, Nightly  vancomycin, 1,250 mg, Intravenous, Q12H    Infusions  Pharmacy to dose vancomycin,   sodium chloride, 100 mL/hr, Last Rate: 100 mL/hr (10/04/22 0737)    Diet  Diet Pureed; Forks / Syrup Thick       Assessment/Plan     Active Hospital Problems    Diagnosis  POA   • **Sigmoid diverticulitis [K57.32]  Unknown   • COPD (chronic obstructive pulmonary disease) (HCC) [J44.9]  Unknown   • Benign essential tremor [G25.0]  Unknown   • 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   Acute Sigmoid diverticulitis with Sepsis, present on admission  - WBC was 20.61 and Lactic acid 2.5 on admission  - continue vancomycin and zosyn, monitor daily CBC  - blood cx's NGTD     Liver lesion  - CT findings large lesion in the liver, repeat CT scan with hemoperitoneum and hematoma but no evidence  of active extravasation; lesion concerning for underlying hepatocellular carcinoma  - Monitor H/H closely, holding lovenox  - appreciate general surgery and GI recs     Mild hypoxemia/COPD  - CT angiogram of the chest showed bibasilar consolidation likely atelectasis vs pneumonia, coverage with abx as above  - encourage pulmonary toilet and wean O2 as tolerated     History of CVA  - has residual oropharyngeal dysphagia and aspiration  - appreciate SLP recs, on modified diet        Left upper lobe pulmonary nodule, as well as enlarged left hilar lymph node.    - CT chest showed 1.5 cm SHAR nodule in addition to a left hilar node 2.3 x1.5 cm - Pulmonology following, appreciate recs- consideration of BX once abdominal issues improved     Essential hypertension  - blood pressure acceptable  - continue holding antihypertensives     Essential tremor  - status post deep brain stimulator:      Chronic neck pain/degenerative disc disease status post low back surgery  - previously received steroid injections to the neck, pain much improved afterwards  - Continue as needed pain medication      SCDs for DVT prophylaxis.  Full code.  Discussed with patient and nursing staff.  Anticipate discharge TBD timing yet to be determined.      Linden Espinal MD  [facility]  10/04/22  12:13 EDT

## 2022-10-04 NOTE — PROGRESS NOTES
[facility]  Inpatient Progress Note    Reason for Follow Up:  Liver lesion, acute diverticulitis    Subjective     Interval History:   He has some right upper quadrant pain with palpation but otherwise denies discomfort.  No nausea.  Breathing is better per the patient.    Current Facility-Administered Medications:   •  acetaminophen (TYLENOL) tablet 650 mg, 650 mg, Oral, Q4H PRN, 650 mg at 10/04/22 0527 **OR** acetaminophen (TYLENOL) 160 MG/5ML solution 650 mg, 650 mg, Oral, Q4H PRN **OR** acetaminophen (TYLENOL) suppository 650 mg, 650 mg, Rectal, Q4H PRN, Melanie Pickard APRN  •  calcium carbonate (TUMS) chewable tablet 500 mg (200 mg elemental), 2 tablet, Oral, BID PRN, Melanie Pickard APRN  •  cetirizine (zyrTEC) tablet 5 mg, 5 mg, Oral, Daily, Adrianne Gomez MD, 5 mg at 10/03/22 1138  •  DULoxetine (CYMBALTA) DR capsule 60 mg, 60 mg, Oral, Daily, Adrianne Gomez MD, 60 mg at 10/04/22 0948  •  HYDROmorphone (DILAUDID) injection 0.5 mg, 0.5 mg, Intravenous, Q2H PRN, Adrianne Gomez MD  •  ipratropium-albuterol (DUO-NEB) nebulizer solution 3 mL, 3 mL, Nebulization, Q4H PRN, Melanie Pickard APRN  •  ipratropium-albuterol (DUO-NEB) nebulizer solution 3 mL, 3 mL, Nebulization, Q4H PRN, Tej Bowman MD  •  ipratropium-albuterol (DUO-NEB) nebulizer solution 3 mL, 3 mL, Nebulization, 4x Daily - RT, Lauro Laguna MD, 3 mL at 10/04/22 1155  •  Magnesium Sulfate 2 gram Bolus, followed by 8 gram infusion (total Mg dose 10 grams)- Mg less than or equal to 1mg/dL, 2 g, Intravenous, PRN **OR** Magnesium Sulfate 2 gram / 50mL Infusion (GIVE X 3 BAGS TO EQUAL 6GM TOTAL DOSE) - Mg 1.1 - 1.5 mg/dl, 2 g, Intravenous, PRN **OR** Magnesium Sulfate 4 gram infusion- Mg 1.6-1.9 mg/dL, 4 g, Intravenous, PRN, Bryant Huber MD  •  montelukast (SINGULAIR) tablet 10 mg, 10 mg, Oral, Nightly, Adrianne Gomez MD, 10 mg at 10/03/22 2125  •  multivitamin with  minerals 1 tablet, 1 tablet, Oral, Daily, Adrianne Gomez MD, 1 tablet at 10/03/22 1138  •  nitroglycerin (NITROSTAT) SL tablet 0.4 mg, 0.4 mg, Sublingual, Q5 Min PRN, Melanie Pickard APRN  •  ondansetron (ZOFRAN) tablet 4 mg, 4 mg, Oral, Q6H PRN **OR** ondansetron (ZOFRAN) injection 4 mg, 4 mg, Intravenous, Q6H PRN, Melanie Pickard APRN  •  oxyCODONE-acetaminophen (PERCOCET) 5-325 MG per tablet 1 tablet, 1 tablet, Oral, Q6H PRN, Adrianne Gomez MD, 1 tablet at 10/04/22 0948  •  pantoprazole (PROTONIX) EC tablet 40 mg, 40 mg, Oral, Q AM, Adrianne Gomez MD, 40 mg at 10/04/22 0527  •  Pharmacy to dose vancomycin, , Does not apply, Continuous PRN, Melanie Pickard APRN  •  piperacillin-tazobactam (ZOSYN) 3.375 g in iso-osmotic dextrose 50 ml (premix), 3.375 g, Intravenous, Q8H, Adrianne Gomez MD, Last Rate: 0 mL/hr at 10/03/22 0341, 3.375 g at 10/04/22 0445  •  potassium chloride (K-DUR,KLOR-CON) ER tablet 40 mEq, 40 mEq, Oral, PRN **OR** potassium chloride (KLOR-CON) packet 40 mEq, 40 mEq, Oral, PRN **OR** potassium chloride 10 mEq in 100 mL IVPB, 10 mEq, Intravenous, Q1H PRN, Bryant Huber MD  •  potassium phosphate 45 mmol in sodium chloride 0.9 % 500 mL infusion, 45 mmol, Intravenous, PRN **OR** potassium phosphate 30 mmol in sodium chloride 0.9 % 250 mL infusion, 30 mmol, Intravenous, PRN **OR** potassium phosphate 15 mmol in sodium chloride 0.9 % 100 mL infusion, 15 mmol, Intravenous, PRN, 15 mmol at 10/03/22 1748 **OR** sodium phosphates 40 mmol in sodium chloride 0.9 % 500 mL IVPB, 40 mmol, Intravenous, PRN **OR** sodium phosphates 20 mmol in sodium chloride 0.9 % 250 mL IVPB, 20 mmol, Intravenous, PRN, Bryant Huber MD  •  [COMPLETED] Insert peripheral IV, , , Once **AND** sodium chloride 0.9 % flush 10 mL, 10 mL, Intravenous, PRN, Jacob Yang III, PA  •  sodium chloride 0.9 % flush 10 mL, 10 mL, Intravenous, Q12H, Melanie Pickard, APRN, 10  mL at 10/03/22 2126  •  sodium chloride 0.9 % flush 10 mL, 10 mL, Intravenous, PRN, Melanie Pickard, APRN, 10 mL at 10/02/22 1054  •  sodium chloride 0.9 % infusion, 100 mL/hr, Intravenous, Continuous, Adrianne Gomez MD, Last Rate: 100 mL/hr at 10/04/22 0737, 100 mL/hr at 10/04/22 0737  •  tamsulosin (FLOMAX) 24 hr capsule 0.4 mg, 0.4 mg, Oral, Nightly, Adrianne Gomez MD, 0.4 mg at 10/03/22 2125  •  vancomycin 1250 mg/250 mL 0.9% NS IVPB ([facility]), 1,250 mg, Intravenous, Q12H, Linden Espinal MD, 1,250 mg at 10/04/22 0949  Review of Systems:    Positive for right upper quadrant pain, negative for nausea or vomiting, negative for fevers or chills    Objective     Vital Signs  Temp:  [97.7 °F (36.5 °C)-98.1 °F (36.7 °C)] 98 °F (36.7 °C)  Heart Rate:  [] 84  Resp:  [16-22] 20  BP: (121-162)/(77-92) 162/92  Body mass index is 24.28 kg/m².    Intake/Output Summary (Last 24 hours) at 10/4/2022 1223  Last data filed at 10/4/2022 0527  Gross per 24 hour   Intake 180 ml   Output 300 ml   Net -120 ml     No intake/output data recorded.     Physical Exam:   General: patient awake, alert and cooperative   Eyes: Normal lids and lashes, no scleral icterus   Neck: supple, normal ROM   Skin: warm and dry, not jaundiced   Cardiovascular: regular rhythm and rate    Pulm: coarse bs bilat, regular and unlabored   Abdomen: soft, ruq tenderness, nondistended    Psychiatric: Normal mood and behavior; memory intact     Results Review:     I reviewed the patient's new clinical results.    Results from last 7 days   Lab Units 10/04/22  0739 10/04/22  0641 10/04/22  0010 10/03/22  1822 10/03/22  0946 10/02/22  0525   WBC 10*3/mm3  --  12.74*  --   --  13.18* 17.59*   HEMOGLOBIN g/dL 9.7* 8.8* 8.5*   < > 9.9* 12.4*   HEMATOCRIT % 27.5* 25.9* 25.2*   < > 28.0* 36.2*   PLATELETS 10*3/mm3  --  98*  --   --  115* 177    < > = values in this interval not displayed.     Results from last 7 days   Lab Units 10/04/22  0641  10/03/22  0946 10/02/22  0525 10/01/22  1939   SODIUM mmol/L 140 139 139 138   POTASSIUM mmol/L 3.5 3.8 3.8 3.7   CHLORIDE mmol/L 107 106 106 102   CO2 mmol/L 24.4 25.4 23.2 26.0   BUN mg/dL 8 13 13 14   CREATININE mg/dL 0.48* 0.62* 0.62* 0.63*   CALCIUM mg/dL 7.9* 8.0* 7.7* 9.0   BILIRUBIN mg/dL 1.2 1.0  --  0.6   ALK PHOS U/L 70 74  --  77   ALT (SGPT) U/L 186* 308*  --  93*   AST (SGOT) U/L 79* 177*  --  118*   GLUCOSE mg/dL 103* 102* 144* 167*     Results from last 7 days   Lab Units 10/03/22  1249   INR  1.31*     Lab Results   Lab Value Date/Time    LIPASE 13 10/01/2022 1939    LIPASE 11 (L) 06/29/2018 1214       Radiology:  CT Abdomen With & Without Contrast   Preliminary Result   There is increasing hemoperitoneum and hematoma identified today. The CT   findings are most concerning for a hemorrhage hepatic lesion that has   now ruptured. The underlying lesion may represent a hepatocellular   carcinoma. There are additional lesions seen within the liver as   described above. These findings were discussed by telephone with Dr. Vizcarra at the time of dictation.       Radiation dose reduction techniques were utilized, including automated   exposure control and exposure modulation based on body size.              XR Chest 1 View   Final Result   Nonspecific bibasilar consolidation.       This report was finalized on 10/1/2022 11:00 PM by Dr. Soheial Mann M.D.          CT Abdomen Pelvis With Contrast   Final Result       1. Bibasilar consolidation may reflect atelectasis, although correlation   with any evidence of pneumonia is recommended.   2. Left upper lobe pulmonary nodule, as well as enlarged left hilar   lymph node. Malignancy cannot be excluded.   3. Apparent heterogeneous lesions within the right lobe of the liver.   Neoplasm versus abscess would be considerations. Full assessment of the   liver lesions is severely limited due to streak artifact and motion   artifact. Liver protocol CT or MRI  could be considered for further   assessment.   4. Diverticulosis, as well as a thick walled segment of sigmoid colon.   This may reflect diverticulitis, although follow-up colonoscopy is   suggested to exclude any underlying neoplasm.   5. Patient does have some free fluid tracking along the paracolic   gutters bilaterally. There may be some additional free fluid around the   liver and spleen.       Radiation dose reduction techniques were utilized, including automated   exposure control and exposure modulation based on body size.       This report was finalized on 10/1/2022 10:42 PM by Dr. Soheila Mann M.D.          CT Angiogram Chest   Final Result       1. Bibasilar consolidation may reflect atelectasis, although correlation   with any evidence of pneumonia is recommended.   2. Left upper lobe pulmonary nodule, as well as enlarged left hilar   lymph node. Malignancy cannot be excluded.   3. Apparent heterogeneous lesions within the right lobe of the liver.   Neoplasm versus abscess would be considerations. Full assessment of the   liver lesions is severely limited due to streak artifact and motion   artifact. Liver protocol CT or MRI could be considered for further   assessment.   4. Diverticulosis, as well as a thick walled segment of sigmoid colon.   This may reflect diverticulitis, although follow-up colonoscopy is   suggested to exclude any underlying neoplasm.   5. Patient does have some free fluid tracking along the paracolic   gutters bilaterally. There may be some additional free fluid around the   liver and spleen.       Radiation dose reduction techniques were utilized, including automated   exposure control and exposure modulation based on body size.       This report was finalized on 10/1/2022 10:42 PM by Dr. Soheila Mann M.D.              Assessment & Plan     Patient Active Problem List   Diagnosis   • Diverticulitis   • COPD (chronic obstructive pulmonary disease) (HCC)   • Benign  essential tremor   • Status post deep brain stimulator placement   • Sigmoid diverticulitis   • Liver lesion   • History of CVA (cerebrovascular accident)   • History of aspiration pneumonia   • Essential hypertension       Assessment:  1. Liver lesion-CT suggest possible bleeding from liver lesion, not an abscess per radiology  2. Acute diverticulitis  3. Anemia - acute post hemorrhagic  4. Hemoperitoneum  5. Elevated lfts        Plan:  · Hemoglobin stabilizing - continue to follow closely-hemodynamically stable  · Ok for diet per speech recs  · Check AFP.  Will need consideration of liver biopsy once acute issues have improved for definitive diagnosis of liver lesions    I discussed the patients findings and my recommendations with patient.    Leeann Buckley MD

## 2022-10-04 NOTE — PROGRESS NOTES
"[facility] Clinical Pharmacy Services: Vancomycin Monitoring Note    Boni Vega is a 81 y.o. male who is on day 3 of pharmacy to dose vancomycin for Sepsis.    Previous Vancomycin Dose:   1000 mg IV every  12  hours  Updated Cultures and Sensitivities: 10/1 BCx x2 no growth x 2 days   Results from last 7 days   Lab Units 10/03/22  0946   VANCOMYCIN TR mcg/mL 12.80     Vitals/Labs  Ht: 182.9 cm (72\"); Wt: 81.2 kg (179 lb 0.2 oz)   Temp Readings from Last 1 Encounters:   10/04/22 98 °F (36.7 °C) (Oral)     Estimated Creatinine Clearance: 133.9 mL/min (A) (by C-G formula based on SCr of 0.48 mg/dL (L)).       Results from last 7 days   Lab Units 10/04/22  0641 10/03/22  0946 10/02/22  0525   CREATININE mg/dL 0.48* 0.62* 0.62*   WBC 10*3/mm3 12.74* 13.18* 17.59*     Assessment/Plan    Current Vancomycin Dose: increase to 1250 mg IV every  12  hours; provides a predicted  mg/L.hr   Next Level Date and Time: Vanc Trough on 10/5 at 0930 or sooner if patient status acutely changes  We will continue to monitor patient changes and renal function     Thank you for involving pharmacy in this patient's care. Please contact pharmacy with any questions or concerns.       Boni Deluca [facility]  Clinical Pharmacist            "

## 2022-10-04 NOTE — CASE MANAGEMENT/SOCIAL WORK
Discharge Planning Assessment  [facility]     Patient Name: Boni Vega  MRN: [id number]  Today's Date: 10/4/2022    Admit Date: 10/1/2022    Plan: Home with family   Discharge Needs Assessment     Row Name 10/04/22 1223       Living Environment    People in Home child(amanda), adult    Name(s) of People in Home Leanna    Current Living Arrangements home    Provides Primary Care For no one, unable/limited ability to care for self    Family Caregiver if Needed child(amanda), adult    Quality of Family Relationships involved;helpful    Able to Return to Prior Arrangements yes       Resource/Environmental Concerns    Resource/Environmental Concerns none       Transition Planning    Patient/Family Anticipates Transition to home with family    Patient/Family Anticipated Services at Transition none    Transportation Anticipated family or friend will provide       Discharge Needs Assessment    Equipment Currently Used at Home cane, quad;cane, quad tip;cane, straight;walker, rolling;rollator;commode;shower chair;grab bar;hospital bed;power chair,(recliner lift)    Concerns to be Addressed adjustment to diagnosis/illness    Anticipated Changes Related to Illness none    Equipment Needed After Discharge none               Discharge Plan     Row Name 10/04/22 1229       Plan    Plan Home with family    Patient/Family in Agreement with Plan yes    Plan Comments Spoke with pt., and his daughter Leanna at bedside, introduced self and explained CCP role. Confirmed face sheet and pharmacy information. Updated PCP now uses [facility] MD, Jessica Evans. Pt lives with Leanna who is primary caregiver for him. She employs a nurse on the weekends to help as well, offered caregiver list to asst with bathing and was declined at this time. Pt is dependent for all ADL’s and per Leanna he has been bed bound for about 6 months or so. Per Leanna when he goes to SNF they get [facility] to [facility] and then insurance kicks him out and  that sets him into a depression. He has been to many facilities for SNF but is not interested in any at this time. Pt has w/c, cane straight and quad, rollator and rolling walker, bsc, elevated commode in bathroom, s/c and grab bars also had lifting recliner chair and hospital bed. Denies needs at this time, may need transport. CCP will follow for possible needs. -Connie CHILDRESS              Continued Care and Services - Admitted Since 10/1/2022    Coordination has not been started for this encounter.       Expected Discharge Date and Time     Expected Discharge Date Expected Discharge Time    Oct 6, 2022          Demographic Summary     Row Name 10/04/22 1222       General Information    Admission Type inpatient               Functional Status     Row Name 10/04/22 1222       Functional Status    Usual Activity Tolerance poor    Current Activity Tolerance poor       Assessment of Health Literacy    Health Literacy Fair       Functional Status, IADL    Medications completely dependent    Meal Preparation completely dependent    Housekeeping completely dependent    Laundry completely dependent    Shopping completely dependent       Mental Status    General Appearance WDL WDL       Mental Status Summary    Recent Changes in Mental Status/Cognitive Functioning decision-making/judgment;memory (recent)               Psychosocial    No documentation.                Abuse/Neglect    No documentation.                Legal     Row Name 10/04/22 1222       Financial/Legal    Who Manages Finances if Patient Unable Dtr Leanna               Substance Abuse    No documentation.                Patient Forms    No documentation.                   Connie Dickey RN

## 2022-10-05 LAB
ALBUMIN SERPL-MCNC: 2.4 G/DL (ref 3.5–5.2)
ALBUMIN/GLOB SERPL: 1.3 G/DL
ALP SERPL-CCNC: 62 U/L (ref 39–117)
ALPHA-FETOPROTEIN: 2.35 NG/ML (ref 0–8.3)
ALT SERPL W P-5'-P-CCNC: 108 U/L (ref 1–41)
ANION GAP SERPL CALCULATED.3IONS-SCNC: 6 MMOL/L (ref 5–15)
AST SERPL-CCNC: 36 U/L (ref 1–40)
BASOPHILS # BLD MANUAL: 0.11 10*3/MM3 (ref 0–0.2)
BASOPHILS NFR BLD MANUAL: 1 % (ref 0–1.5)
BILIRUB SERPL-MCNC: 0.9 MG/DL (ref 0–1.2)
BUN SERPL-MCNC: 10 MG/DL (ref 8–23)
BUN/CREAT SERPL: 13 (ref 7–25)
CALCIUM SPEC-SCNC: 7.6 MG/DL (ref 8.6–10.5)
CHLORIDE SERPL-SCNC: 109 MMOL/L (ref 98–107)
CO2 SERPL-SCNC: 26 MMOL/L (ref 22–29)
CREAT SERPL-MCNC: 0.77 MG/DL (ref 0.76–1.27)
DEPRECATED RDW RBC AUTO: 42.2 FL (ref 37–54)
EGFRCR SERPLBLD CKD-EPI 2021: 88.8 ML/MIN/1.73
EOSINOPHIL # BLD MANUAL: 0.21 10*3/MM3 (ref 0–0.4)
EOSINOPHIL NFR BLD MANUAL: 2 % (ref 0.3–6.2)
ERYTHROCYTE [DISTWIDTH] IN BLOOD BY AUTOMATED COUNT: 12.3 % (ref 12.3–15.4)
GLOBULIN UR ELPH-MCNC: 1.9 GM/DL
GLUCOSE SERPL-MCNC: 101 MG/DL (ref 65–99)
HCT VFR BLD AUTO: 23.7 % (ref 37.5–51)
HCT VFR BLD AUTO: 23.8 % (ref 37.5–51)
HCT VFR BLD AUTO: 25.6 % (ref 37.5–51)
HCT VFR BLD AUTO: 27.2 % (ref 37.5–51)
HGB BLD-MCNC: 7.9 G/DL (ref 13–17.7)
HGB BLD-MCNC: 8.1 G/DL (ref 13–17.7)
HGB BLD-MCNC: 8.8 G/DL (ref 13–17.7)
HGB BLD-MCNC: 9.1 G/DL (ref 13–17.7)
LYMPHOCYTES # BLD MANUAL: 1.09 10*3/MM3 (ref 0.7–3.1)
LYMPHOCYTES NFR BLD MANUAL: 3.1 % (ref 5–12)
MCH RBC QN AUTO: 31.3 PG (ref 26.6–33)
MCHC RBC AUTO-ENTMCNC: 33.3 G/DL (ref 31.5–35.7)
MCV RBC AUTO: 94 FL (ref 79–97)
MONOCYTES # BLD: 0.33 10*3/MM3 (ref 0.1–0.9)
NEUTROPHILS # BLD AUTO: 8.96 10*3/MM3 (ref 1.7–7)
NEUTROPHILS NFR BLD MANUAL: 83.7 % (ref 42.7–76)
PHOSPHATE SERPL-MCNC: 3 MG/DL (ref 2.5–4.5)
PLAT MORPH BLD: NORMAL
PLATELET # BLD AUTO: 109 10*3/MM3 (ref 140–450)
PMV BLD AUTO: 10.8 FL (ref 6–12)
POTASSIUM SERPL-SCNC: 3.3 MMOL/L (ref 3.5–5.2)
POTASSIUM SERPL-SCNC: 3.9 MMOL/L (ref 3.5–5.2)
PROT SERPL-MCNC: 4.3 G/DL (ref 6–8.5)
RBC # BLD AUTO: 2.52 10*6/MM3 (ref 4.14–5.8)
RBC MORPH BLD: NORMAL
SODIUM SERPL-SCNC: 141 MMOL/L (ref 136–145)
VANCOMYCIN TROUGH SERPL-MCNC: 22.8 MCG/ML (ref 5–20)
VARIANT LYMPHS NFR BLD MANUAL: 10.2 % (ref 19.6–45.3)
WBC MORPH BLD: NORMAL
WBC NRBC COR # BLD: 10.71 10*3/MM3 (ref 3.4–10.8)

## 2022-10-05 PROCEDURE — 85018 HEMOGLOBIN: CPT | Performed by: INTERNAL MEDICINE

## 2022-10-05 PROCEDURE — 25010000002 PIPERACILLIN SOD-TAZOBACTAM PER 1 G: Performed by: STUDENT IN AN ORGANIZED HEALTH CARE EDUCATION/TRAINING PROGRAM

## 2022-10-05 PROCEDURE — 85025 COMPLETE CBC W/AUTO DIFF WBC: CPT | Performed by: INTERNAL MEDICINE

## 2022-10-05 PROCEDURE — 85007 BL SMEAR W/DIFF WBC COUNT: CPT | Performed by: INTERNAL MEDICINE

## 2022-10-05 PROCEDURE — 84132 ASSAY OF SERUM POTASSIUM: CPT | Performed by: INTERNAL MEDICINE

## 2022-10-05 PROCEDURE — 94799 UNLISTED PULMONARY SVC/PX: CPT

## 2022-10-05 PROCEDURE — 82105 ALPHA-FETOPROTEIN SERUM: CPT | Performed by: INTERNAL MEDICINE

## 2022-10-05 PROCEDURE — 80053 COMPREHEN METABOLIC PANEL: CPT | Performed by: INTERNAL MEDICINE

## 2022-10-05 PROCEDURE — 92610 EVALUATE SWALLOWING FUNCTION: CPT

## 2022-10-05 PROCEDURE — 25010000002 VANCOMYCIN 10 G RECONSTITUTED SOLUTION: Performed by: NURSE PRACTITIONER

## 2022-10-05 PROCEDURE — 80202 ASSAY OF VANCOMYCIN: CPT | Performed by: NURSE PRACTITIONER

## 2022-10-05 PROCEDURE — 99232 SBSQ HOSP IP/OBS MODERATE 35: CPT | Performed by: INTERNAL MEDICINE

## 2022-10-05 PROCEDURE — 85014 HEMATOCRIT: CPT | Performed by: INTERNAL MEDICINE

## 2022-10-05 PROCEDURE — 99231 SBSQ HOSP IP/OBS SF/LOW 25: CPT | Performed by: SURGERY

## 2022-10-05 PROCEDURE — 94664 DEMO&/EVAL PT USE INHALER: CPT

## 2022-10-05 PROCEDURE — 84100 ASSAY OF PHOSPHORUS: CPT | Performed by: INTERNAL MEDICINE

## 2022-10-05 RX ADMIN — DULOXETINE HYDROCHLORIDE 60 MG: 60 CAPSULE, DELAYED RELEASE ORAL at 08:51

## 2022-10-05 RX ADMIN — TAZOBACTAM SODIUM AND PIPERACILLIN SODIUM 3.38 G: 375; 3 INJECTION, SOLUTION INTRAVENOUS at 11:32

## 2022-10-05 RX ADMIN — OXYCODONE AND ACETAMINOPHEN 1 TABLET: 5; 325 TABLET ORAL at 02:47

## 2022-10-05 RX ADMIN — IPRATROPIUM BROMIDE AND ALBUTEROL SULFATE 3 ML: 2.5; .5 SOLUTION RESPIRATORY (INHALATION) at 19:16

## 2022-10-05 RX ADMIN — PANTOPRAZOLE SODIUM 40 MG: 40 TABLET, DELAYED RELEASE ORAL at 05:11

## 2022-10-05 RX ADMIN — IPRATROPIUM BROMIDE AND ALBUTEROL SULFATE 3 ML: 2.5; .5 SOLUTION RESPIRATORY (INHALATION) at 11:12

## 2022-10-05 RX ADMIN — ACETAMINOPHEN 650 MG: 325 TABLET, FILM COATED ORAL at 20:32

## 2022-10-05 RX ADMIN — CETIRIZINE HYDROCHLORIDE 5 MG: 10 TABLET ORAL at 08:51

## 2022-10-05 RX ADMIN — TAMSULOSIN HYDROCHLORIDE 0.4 MG: 0.4 CAPSULE ORAL at 20:23

## 2022-10-05 RX ADMIN — Medication 10 ML: at 20:32

## 2022-10-05 RX ADMIN — TAZOBACTAM SODIUM AND PIPERACILLIN SODIUM 3.38 G: 375; 3 INJECTION, SOLUTION INTRAVENOUS at 20:23

## 2022-10-05 RX ADMIN — SODIUM CHLORIDE 100 ML/HR: 9 INJECTION, SOLUTION INTRAVENOUS at 15:57

## 2022-10-05 RX ADMIN — VANCOMYCIN HYDROCHLORIDE 1250 MG: 10 INJECTION, POWDER, LYOPHILIZED, FOR SOLUTION INTRAVENOUS at 21:49

## 2022-10-05 RX ADMIN — POTASSIUM CHLORIDE 40 MEQ: 750 TABLET, EXTENDED RELEASE ORAL at 08:59

## 2022-10-05 RX ADMIN — MONTELUKAST SODIUM 10 MG: 10 TABLET, FILM COATED ORAL at 20:23

## 2022-10-05 RX ADMIN — MULTIPLE VITAMINS W/ MINERALS TAB 1 TABLET: TAB at 08:51

## 2022-10-05 RX ADMIN — IPRATROPIUM BROMIDE AND ALBUTEROL SULFATE 3 ML: 2.5; .5 SOLUTION RESPIRATORY (INHALATION) at 07:00

## 2022-10-05 RX ADMIN — TAZOBACTAM SODIUM AND PIPERACILLIN SODIUM 3.38 G: 375; 3 INJECTION, SOLUTION INTRAVENOUS at 05:11

## 2022-10-05 RX ADMIN — SODIUM CHLORIDE 100 ML/HR: 9 INJECTION, SOLUTION INTRAVENOUS at 05:11

## 2022-10-05 RX ADMIN — POTASSIUM CHLORIDE 40 MEQ: 750 TABLET, EXTENDED RELEASE ORAL at 12:35

## 2022-10-05 RX ADMIN — Medication 10 ML: at 08:54

## 2022-10-05 NOTE — PROGRESS NOTES
"[facility]      Mar/chart reviewed  Follow up left hilar lymphadenopathy and left lung nodule, emphysema  Confused, not able to provide much history  \"I'm in a strange place\"    Vital Sign Min/Max for last 24 hours  Temp  Min: 97.5 °F (36.4 °C)  Max: 98.4 °F (36.9 °C)   BP  Min: 119/83  Max: 162/92   Pulse  Min: 77  Max: 97   Resp  Min: 16  Max: 22   SpO2  Min: 95 %  Max: 99 %   Flow (L/min)  Min: 3  Max: 3   No data recorded     Appears ill, sleepy arouses, not oriented  perrl, eomi, normal sclera  mmm, no jvd, trachea midline, neck supple,  chest decreased slightly coarse ae bilaterally, no crackles,   no wheezes,   Tachy, regular  soft, tender, slightly distended, firm ruq +bs,  no c/c/  trace edema  Skin warm, dry no rashes    Labs: 10/5: reviewed:  Cr 0.77  Bicarb 26  Wbc 10.7  hgb 7.9  plts 109    A/P:  1. COPD with extensive emphysema -- nebs and flutter valve  2. Left upper lobe lung nodule and hilar lymphadenopathy - looks like it will be a difficult biopsy. Defer until GI issues are resolved  3. Hypoxemia   4. Sigmoid diverticulitis  5. Liver lesion--with hemorrhage possible malignant?  "

## 2022-10-05 NOTE — PROGRESS NOTES
[facility]  Inpatient Progress Note    Reason for Follow Up:  Liver lesion, acute diverticulitis    Subjective     Interval History:   Reports that his abdominal pain is better.  He has eaten some.  No other complaints at present    Current Facility-Administered Medications:   •  acetaminophen (TYLENOL) tablet 650 mg, 650 mg, Oral, Q4H PRN, 650 mg at 10/04/22 0527 **OR** acetaminophen (TYLENOL) 160 MG/5ML solution 650 mg, 650 mg, Oral, Q4H PRN **OR** acetaminophen (TYLENOL) suppository 650 mg, 650 mg, Rectal, Q4H PRN, Melanie Pickard APRN  •  calcium carbonate (TUMS) chewable tablet 500 mg (200 mg elemental), 2 tablet, Oral, BID PRN, Melanie Pickard APRN  •  cetirizine (zyrTEC) tablet 5 mg, 5 mg, Oral, Daily, Adrianne Gomez MD, 5 mg at 10/05/22 0851  •  DULoxetine (CYMBALTA) DR capsule 60 mg, 60 mg, Oral, Daily, Adrianne Gomez MD, 60 mg at 10/05/22 0851  •  HYDROmorphone (DILAUDID) injection 0.5 mg, 0.5 mg, Intravenous, Q2H PRN, Adrianne Gomez MD  •  ipratropium-albuterol (DUO-NEB) nebulizer solution 3 mL, 3 mL, Nebulization, Q4H PRN, Melanie Pickard APRN  •  ipratropium-albuterol (DUO-NEB) nebulizer solution 3 mL, 3 mL, Nebulization, Q4H PRN, Tej Bowman MD  •  ipratropium-albuterol (DUO-NEB) nebulizer solution 3 mL, 3 mL, Nebulization, 4x Daily - RT, Lauro Laguna MD, 3 mL at 10/05/22 1112  •  Magnesium Sulfate 2 gram Bolus, followed by 8 gram infusion (total Mg dose 10 grams)- Mg less than or equal to 1mg/dL, 2 g, Intravenous, PRN **OR** Magnesium Sulfate 2 gram / 50mL Infusion (GIVE X 3 BAGS TO EQUAL 6GM TOTAL DOSE) - Mg 1.1 - 1.5 mg/dl, 2 g, Intravenous, PRN **OR** Magnesium Sulfate 4 gram infusion- Mg 1.6-1.9 mg/dL, 4 g, Intravenous, PRN, Bryant Huber MD  •  montelukast (SINGULAIR) tablet 10 mg, 10 mg, Oral, Nightly, Adrianne Gomez MD, 10 mg at 10/04/22 2005  •  multivitamin with minerals 1 tablet, 1 tablet, Oral, Daily,  Adrianne Gomez MD, 1 tablet at 10/05/22 0851  •  nitroglycerin (NITROSTAT) SL tablet 0.4 mg, 0.4 mg, Sublingual, Q5 Min PRN, Melanie Pickard APRN  •  ondansetron (ZOFRAN) tablet 4 mg, 4 mg, Oral, Q6H PRN **OR** ondansetron (ZOFRAN) injection 4 mg, 4 mg, Intravenous, Q6H PRN, Melanie Pickard APRN  •  oxyCODONE-acetaminophen (PERCOCET) 5-325 MG per tablet 1 tablet, 1 tablet, Oral, Q6H PRN, Adrianne Gomez MD, 1 tablet at 10/05/22 0247  •  pantoprazole (PROTONIX) EC tablet 40 mg, 40 mg, Oral, Q AM, Adrianne Gomez MD, 40 mg at 10/05/22 0511  •  Pharmacy to dose vancomycin, , Does not apply, Continuous PRN, Melanie Pickard APRN  •  piperacillin-tazobactam (ZOSYN) 3.375 g in iso-osmotic dextrose 50 ml (premix), 3.375 g, Intravenous, Q8H, Adrianne Gomez MD, Last Rate: 0 mL/hr at 10/03/22 0341, 3.375 g at 10/05/22 1132  •  potassium chloride (K-DUR,KLOR-CON) ER tablet 40 mEq, 40 mEq, Oral, PRN, 40 mEq at 10/05/22 1235 **OR** potassium chloride (KLOR-CON) packet 40 mEq, 40 mEq, Oral, PRN **OR** potassium chloride 10 mEq in 100 mL IVPB, 10 mEq, Intravenous, Q1H PRN, Bryant Huber MD  •  potassium phosphate 45 mmol in sodium chloride 0.9 % 500 mL infusion, 45 mmol, Intravenous, PRN **OR** potassium phosphate 30 mmol in sodium chloride 0.9 % 250 mL infusion, 30 mmol, Intravenous, PRN, Last Rate: 31.3 mL/hr at 10/04/22 1229, 30 mmol at 10/04/22 1229 **OR** potassium phosphate 15 mmol in sodium chloride 0.9 % 100 mL infusion, 15 mmol, Intravenous, PRN, 15 mmol at 10/03/22 1748 **OR** sodium phosphates 40 mmol in sodium chloride 0.9 % 500 mL IVPB, 40 mmol, Intravenous, PRN **OR** sodium phosphates 20 mmol in sodium chloride 0.9 % 250 mL IVPB, 20 mmol, Intravenous, PRN, Bryant Huber MD  •  [COMPLETED] Insert peripheral IV, , , Once **AND** sodium chloride 0.9 % flush 10 mL, 10 mL, Intravenous, PRN, Jacob Yang III, PA  •  sodium chloride 0.9 % flush 10 mL, 10 mL,  Intravenous, Q12H, Melanie Pickard APRN, 10 mL at 10/05/22 0854  •  sodium chloride 0.9 % flush 10 mL, 10 mL, Intravenous, PRN, Melanie Pickard APRN, 10 mL at 10/02/22 1054  •  sodium chloride 0.9 % infusion, 100 mL/hr, Intravenous, Continuous, Adrianne Gomez MD, Held at 10/05/22 1406  •  tamsulosin (FLOMAX) 24 hr capsule 0.4 mg, 0.4 mg, Oral, Nightly, Adrianne Gomez MD, 0.4 mg at 10/04/22 2005  •  vancomycin 1250 mg/250 mL 0.9% NS IVPB ([facility]), 1,250 mg, Intravenous, Q24H, Melanie Pickard APRN  Review of Systems:    Positive for abdominal pain, negative for nausea, negative for fevers or chills    Objective     Vital Signs  Temp:  [97.5 °F (36.4 °C)-98.4 °F (36.9 °C)] 98.4 °F (36.9 °C)  Heart Rate:  [] 100  Resp:  [16-22] 16  BP: (119-166)/(68-92) 166/92  Body mass index is 24.28 kg/m².    Intake/Output Summary (Last 24 hours) at 10/5/2022 1538  Last data filed at 10/5/2022 0853  Gross per 24 hour   Intake 4628 ml   Output 150 ml   Net 4478 ml     I/O this shift:  In: 236 [P.O.:236]  Out: 150 [Urine:150]     Physical Exam:   General: patient awake, alert and cooperative   Eyes: Normal lids and lashes, no scleral icterus   Neck: supple, normal ROM   Skin: warm and dry, not jaundiced   Pulm:   regular and unlabored   Abdomen: soft, right upper quadrant tenderness, nondistended    Extremities: no rash or edema   Psychiatric: Normal mood and behavior; memory intact     Results Review:     I reviewed the patient's new clinical results.    Results from last 7 days   Lab Units 10/05/22  1149 10/05/22  0559 10/05/22  0008 10/04/22  0739 10/04/22  0641 10/03/22  1822 10/03/22  0946   WBC 10*3/mm3  --  10.71  --   --  12.74*  --  13.18*   HEMOGLOBIN g/dL 8.8* 7.9* 8.1*   < > 8.8*   < > 9.9*   HEMATOCRIT % 25.6* 23.7* 23.8*   < > 25.9*   < > 28.0*   PLATELETS 10*3/mm3  --  109*  --   --  98*  --  115*    < > = values in this interval not displayed.     Results from last 7 days   Lab Units  10/05/22  0559 10/04/22  0641 10/03/22  0946   SODIUM mmol/L 141 140 139   POTASSIUM mmol/L 3.3* 3.5 3.8   CHLORIDE mmol/L 109* 107 106   CO2 mmol/L 26.0 24.4 25.4   BUN mg/dL 10 8 13   CREATININE mg/dL 0.77 0.48* 0.62*   CALCIUM mg/dL 7.6* 7.9* 8.0*   BILIRUBIN mg/dL 0.9 1.2 1.0   ALK PHOS U/L 62 70 74   ALT (SGPT) U/L 108* 186* 308*   AST (SGOT) U/L 36 79* 177*   GLUCOSE mg/dL 101* 103* 102*     Results from last 7 days   Lab Units 10/03/22  1249   INR  1.31*     Lab Results   Lab Value Date/Time    LIPASE 13 10/01/2022 1939    LIPASE 11 (L) 06/29/2018 1214       Radiology:  CT Abdomen With & Without Contrast   Final Result   There is increasing hemoperitoneum and hematoma identified today. The CT   findings are most concerning for hepatic lesion that is hemorrhage and   subsequently ruptured. The underlying lesion may represent a   hepatocellular carcinoma. There are additional lesions seen within the   liver as described above. These findings were discussed by telephone   with Dr. Vizcarra at the time of dictation.       Radiation dose reduction techniques were utilized, including automated   exposure control and exposure modulation based on body size.       This report was finalized on 10/5/2022 12:44 PM by Dr. Baldev Kraft M.D.          XR Chest 1 View   Final Result   Nonspecific bibasilar consolidation.       This report was finalized on 10/1/2022 11:00 PM by Dr. Soheila Mann M.D.          CT Abdomen Pelvis With Contrast   Final Result       1. Bibasilar consolidation may reflect atelectasis, although correlation   with any evidence of pneumonia is recommended.   2. Left upper lobe pulmonary nodule, as well as enlarged left hilar   lymph node. Malignancy cannot be excluded.   3. Apparent heterogeneous lesions within the right lobe of the liver.   Neoplasm versus abscess would be considerations. Full assessment of the   liver lesions is severely limited due to streak artifact and motion    artifact. Liver protocol CT or MRI could be considered for further   assessment.   4. Diverticulosis, as well as a thick walled segment of sigmoid colon.   This may reflect diverticulitis, although follow-up colonoscopy is   suggested to exclude any underlying neoplasm.   5. Patient does have some free fluid tracking along the paracolic   gutters bilaterally. There may be some additional free fluid around the   liver and spleen.       Radiation dose reduction techniques were utilized, including automated   exposure control and exposure modulation based on body size.       This report was finalized on 10/1/2022 10:42 PM by Dr. Soheila Mann M.D.          CT Angiogram Chest   Final Result       1. Bibasilar consolidation may reflect atelectasis, although correlation   with any evidence of pneumonia is recommended.   2. Left upper lobe pulmonary nodule, as well as enlarged left hilar   lymph node. Malignancy cannot be excluded.   3. Apparent heterogeneous lesions within the right lobe of the liver.   Neoplasm versus abscess would be considerations. Full assessment of the   liver lesions is severely limited due to streak artifact and motion   artifact. Liver protocol CT or MRI could be considered for further   assessment.   4. Diverticulosis, as well as a thick walled segment of sigmoid colon.   This may reflect diverticulitis, although follow-up colonoscopy is   suggested to exclude any underlying neoplasm.   5. Patient does have some free fluid tracking along the paracolic   gutters bilaterally. There may be some additional free fluid around the   liver and spleen.       Radiation dose reduction techniques were utilized, including automated   exposure control and exposure modulation based on body size.       This report was finalized on 10/1/2022 10:42 PM by Dr. Soheila Mann M.D.              Assessment & Plan     Patient Active Problem List   Diagnosis   • Diverticulitis   • COPD (chronic obstructive  pulmonary disease) (HCC)   • Benign essential tremor   • Status post deep brain stimulator placement   • Sigmoid diverticulitis   • Liver lesion   • History of CVA (cerebrovascular accident)   • History of aspiration pneumonia   • Essential hypertension       Assessment:  1. Liver lesion-CT suggest possible bleeding from liver lesion, not an abscess per radiology  2. Acute diverticulitis  3. Anemia - acute post hemorrhagic  4. Hemoperitoneum  5. Elevated lfts        Plan:  · Hemoglobin relatively stable-continue to follow closely  · Follow-up AFP -consider CT-guided biopsy of liver lesions depending on AFP level for definitive diagnosis if needed  · Dr. Meyers's note reviewed.  Patient is frail with multiple comorbidities.  Not a surgical candidate     I discussed the patients findings and my recommendations with patient.    Leeann Buckley MD

## 2022-10-05 NOTE — PROGRESS NOTES
Name: Boni Vega ADMIT: 10/1/2022   : 1940  PCP: Radha Mata    MRN: [id number] LOS: 2 days   AGE/SEX: 81 y.o. male  ROOM: [facility]     Subjective   Subjective   CC: abdominal pain  No acute events. Patient has no new complaints. Abdominal pain is well-controlled. Taking PO, on modified diet. No CP/dyspnea/f/c/n/v/d.    Objective   Objective   Vital Signs  Temp:  [97.5 °F (36.4 °C)-98.4 °F (36.9 °C)] 98.4 °F (36.9 °C)  Heart Rate:  [] 100  Resp:  [16-22] 16  BP: (119-166)/(68-92) 166/92  SpO2:  [90 %-99 %] 92 %  on  Flow (L/min):  [3-4] 3;   Device (Oxygen Therapy): room air  Body mass index is 24.28 kg/m².  Physical Exam  Vitals and nursing note reviewed.   Constitutional:       General: He is not in acute distress.     Appearance: He is ill-appearing. He is not toxic-appearing or diaphoretic.   HENT:      Head: Normocephalic and atraumatic.      Nose: Nose normal.      Mouth/Throat:      Mouth: Mucous membranes are moist.      Pharynx: Oropharynx is clear.   Eyes:      Conjunctiva/sclera: Conjunctivae normal.      Pupils: Pupils are equal, round, and reactive to light.   Cardiovascular:      Rate and Rhythm: Normal rate and regular rhythm.      Pulses: Normal pulses.   Pulmonary:      Effort: Pulmonary effort is normal.      Breath sounds: Examination of the right-lower field reveals decreased breath sounds. Examination of the left-lower field reveals decreased breath sounds. Decreased breath sounds present.   Abdominal:      General: Bowel sounds are normal.      Palpations: Abdomen is soft.      Tenderness: There is abdominal tenderness (mild, diffuse). There is no guarding or rebound.   Musculoskeletal:         General: No swelling or tenderness.      Cervical back: Normal range of motion and neck supple.   Skin:     General: Skin is warm and dry.      Capillary Refill: Capillary refill takes less than 2 seconds.   Neurological:      General: No focal deficit present.      Mental Status:  He is alert.   Psychiatric:         Mood and Affect: Mood normal.         Behavior: Behavior normal.         Cognition and Memory: Cognition is impaired. Memory is impaired.       Results Review     I reviewed the patient's new clinical results.  I reviewed the patient's telemetry.  Results from last 7 days   Lab Units 10/05/22  1149 10/05/22  0559 10/05/22  0008 10/04/22  1556 10/04/22  0739 10/04/22  0641 10/03/22  1822 10/03/22  0946 10/02/22  0525   WBC 10*3/mm3  --  10.71  --   --   --  12.74*  --  13.18* 17.59*   HEMOGLOBIN g/dL 8.8* 7.9* 8.1* 8.7*   < > 8.8*   < > 9.9* 12.4*   PLATELETS 10*3/mm3  --  109*  --   --   --  98*  --  115* 177    < > = values in this interval not displayed.     Results from last 7 days   Lab Units 10/05/22  0559 10/04/22  0641 10/03/22  0946 10/02/22  0525   SODIUM mmol/L 141 140 139 139   POTASSIUM mmol/L 3.3* 3.5 3.8 3.8   CHLORIDE mmol/L 109* 107 106 106   CO2 mmol/L 26.0 24.4 25.4 23.2   BUN mg/dL 10 8 13 13   CREATININE mg/dL 0.77 0.48* 0.62* 0.62*   GLUCOSE mg/dL 101* 103* 102* 144*   EGFR mL/min/1.73 88.8 102.5 94.8 94.8     Results from last 7 days   Lab Units 10/05/22  0559 10/04/22  0641 10/03/22  0946 10/01/22  1939   ALBUMIN g/dL 2.40* 2.60* 2.80* 3.50   BILIRUBIN mg/dL 0.9 1.2 1.0 0.6   ALK PHOS U/L 62 70 74 77   AST (SGOT) U/L 36 79* 177* 118*   ALT (SGPT) U/L 108* 186* 308* 93*     Results from last 7 days   Lab Units 10/05/22  0559 10/04/22  0641 10/03/22  0946 10/02/22  0525 10/01/22  1939   CALCIUM mg/dL 7.6* 7.9* 8.0* 7.7* 9.0   ALBUMIN g/dL 2.40* 2.60* 2.80*  --  3.50   MAGNESIUM mg/dL  --   --  1.6  --   --    PHOSPHORUS mg/dL 3.0 1.5* 2.1*  --   --      Results from last 7 days   Lab Units 10/02/22  1614 10/02/22  1433 10/02/22  0857 10/02/22  0525 10/01/22  1956 10/01/22  1939   PROCALCITONIN ng/mL  --   --   --   --   --  0.09   LACTATE mmol/L 2.0 2.2* 2.4* 2.4*   < >  --     < > = values in this interval not displayed.     No results found for: HGBA1C,  POCGLU    No radiology results for the last day  Scheduled Medications  cetirizine, 5 mg, Oral, Daily  DULoxetine, 60 mg, Oral, Daily  ipratropium-albuterol, 3 mL, Nebulization, 4x Daily - RT  montelukast, 10 mg, Oral, Nightly  multivitamin with minerals, 1 tablet, Oral, Daily  pantoprazole, 40 mg, Oral, Q AM  piperacillin-tazobactam, 3.375 g, Intravenous, Q8H  sodium chloride, 10 mL, Intravenous, Q12H  tamsulosin, 0.4 mg, Oral, Nightly  vancomycin, 1,250 mg, Intravenous, Q24H    Infusions  Pharmacy to dose vancomycin,   sodium chloride, 100 mL/hr, Last Rate: 100 mL/hr (10/05/22 1557)    Diet  Diet Pureed; [place] / Syrup Thick       Assessment/Plan     Active Hospital Problems    Diagnosis  POA   • **Sigmoid diverticulitis [K57.32]  Unknown   • COPD (chronic obstructive pulmonary disease) (HCC) [J44.9]  Unknown   • Benign essential tremor [G25.0]  Unknown   • 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   Acute Sigmoid diverticulitis with Sepsis, present on admission  - WBC was 20.61 and Lactic acid 2.5 on admission  - continue vancomycin and zosyn, monitor daily CBC  - blood cx's NGTD     Liver lesion  - CT findings large lesion in the liver, repeat CT scan with hemoperitoneum and hematoma but no evidence of active extravasation; lesion concerning for underlying hepatocellular carcinoma  - Monitor H/H closely, holding lovenox  - appreciate general surgery and GI recs     Mild hypoxemia/COPD  - CT angiogram of the chest showed bibasilar consolidation likely atelectasis vs pneumonia, coverage with abx as above  - encourage pulmonary toilet and wean O2 as tolerated     History of CVA  - has  residual oropharyngeal dysphagia and aspiration  - appreciate SLP recs, on modified diet    Left upper lobe pulmonary nodule, as well as enlarged left hilar lymph node.    - CT chest showed 1.5 cm SHAR nodule in addition to a left hilar node 2.3 x1.5 cm - Pulmonology following, appreciate recs- consideration of BX once abdominal issues improved     Essential hypertension  - blood pressure acceptable  - continue holding antihypertensives     Essential tremor  - status post deep brain stimulator     Chronic neck pain/degenerative disc disease status post low back surgery  - previously received steroid injections to the neck, pain much improved afterwards  - Continue as needed pain medication    Overall guarded prognosis. If we do end up getting him a biopsy and confirming malignancy I am not sure if he would be a candidate for aggressive treatment given his overall poor health and frail state. I did attempt to contact the patient's daughter, Leanna, at   [phone] at the time of this writing and she did not answer.     SCDs for DVT prophylaxis.  Full code.  Discussed with patient and nursing staff.  Anticipate discharge TBD timing yet to be determined.      Linden Espinal MD  [facility]  10/05/22  16:47 EDT

## 2022-10-05 NOTE — PROGRESS NOTES
Chief Complaint:    Liver mass and acute diverticulitis    Subjective:    The patient is generally feeling better.  He no longer has left lower quadrant abdominal pain but only right upper quadrant abdominal pain.    Objective:    Temp:  [97.5 °F (36.4 °C)-98.4 °F (36.9 °C)] 98 °F (36.7 °C)  Heart Rate:  [] 100  Resp:  [16-22] 16  BP: (119-166)/(68-92) 152/89    Physical Exam  Constitutional:       Appearance: He is ill-appearing. He is not toxic-appearing.   Abdominal:      Palpations: Abdomen is soft.      Tenderness: There is abdominal tenderness (Mildly tender) in the right upper quadrant.   Neurological:      Mental Status: He is alert.   Psychiatric:         Behavior: Behavior is cooperative.         Results:    WBC is 10.71.  H/H was 7.9/23.7 this morning but repeat H/H was 8.8/25.6.  AFP is 2.35.    Assessment/Plan:    The patient has acute diverticulitis that is clinically resolving with conservative management.    He has a liver mass that is likely a malignancy.  There is evidence that it has bled but his H/H is stable suggesting no active or significant bleeding.  I tried to call his daughter regarding further treatment options but she did not answer.  A decision needs to be made regarding proceeding with a biopsy or opting for palliative care.    Edward Meyers Jr., M.D.

## 2022-10-05 NOTE — PLAN OF CARE
Goal Outcome Evaluation:      Clinical swallow re-evaluation completed.    Pt presents with oral dysphagia in part due to lack of dentures, and oral motor weakness/incoordination and s/s of pharyngeal   dysphagia. Difficulty coordinating respiration with mastication/swallowing also noted as O2 sats dropped during attempts to swallow soft solid/regular consistencies.    Recommend:    Continue puree diet with nectar thick liquids.  Upright for all meals. Slow rate. Small bites and single cup sips. Alternate liquid/solid. Upright for 30 min after meals. Supervision for meals.    Medications crushed  and given in puree.     Re-eval when dentures are available and/or  pt is stronger.

## 2022-10-05 NOTE — PLAN OF CARE
Goal Outcome Evaluation:      ST note - Addendum to swallow re-evaluaiton     Pt may  benefit from instrumental assessment ( VFSS or FEES) if MD concerned re: possibility of current aspiration.  Note chest x ray of bibasilar consolidation with possibility of PNA.    Please order VFSS if feel warranted.

## 2022-10-05 NOTE — PROGRESS NOTES
"[facility] Clinical Pharmacy Services: Vancomycin Monitoring Note    Boni Vega is a 81 y.o. male who is on day 3 of pharmacy to dose vancomycin for Sepsis.    Previous Vancomycin Dose:   1250 mg IV every  12  hours  Updated Cultures and Sensitivities: 10/1 BCx x2 no growth x 3 days   Results from last 7 days   Lab Units 10/05/22  0924 10/03/22  0946   VANCOMYCIN TR mcg/mL 22.80* 12.80     Vitals/Labs  Ht: 182.9 cm (72\"); Wt: 81.2 kg (179 lb 0.2 oz)   Temp Readings from Last 1 Encounters:   10/05/22 98.4 °F (36.9 °C) (Oral)     Estimated Creatinine Clearance: 83.5 mL/min (by C-G formula based on SCr of 0.77 mg/dL).       Results from last 7 days   Lab Units 10/05/22  0559 10/04/22  0641 10/03/22  0946   CREATININE mg/dL 0.77 0.48* 0.62*   WBC 10*3/mm3 10.71 12.74* 13.18*     Assessment/Plan    Vanc trough is supratherapeutic at 22.8 mcg/mL (~11 hours post dose). Scr has also increased to 0.77 mg/dL. Will adjust vancomycin dose and continue to monitor renal function.    Current Vancomycin Dose: decrease to 1250 mg IV every  24  hours; provides a predicted  mg/L.hr   Next Level Date and Time: Will wait to schedule trough if duration is extended or if patient status acutely changes  We will continue to monitor patient changes and renal function     Thank you for involving pharmacy in this patient's care. Please contact pharmacy with any questions or concerns.       Ruby Miles, [facility]  Clinical Pharmacist              "

## 2022-10-05 NOTE — PLAN OF CARE
Goal Outcome Evaluation:  Plan of Care Reviewed With: patient     Problem: Adult Inpatient Plan of Care  Goal: Plan of Care Review  Outcome: Ongoing, Progressing  Flowsheets (Taken 10/5/2022 1644)  Progress: improving  Plan of Care Reviewed With: patient  Outcome Evaluation: VSS. No signs of distress. A&O to self and place, confused/forgetful. Q2T. K+ replaced. H&Hs. BMx1. O2 3-4L NC. Encouraging PO intake  Goal: Patient-Specific Goal (Individualized)  Outcome: Ongoing, Progressing  Goal: Absence of Hospital-Acquired Illness or Injury  Outcome: Ongoing, Progressing  Intervention: Identify and Manage Fall Risk  Recent Flowsheet Documentation  Taken 10/5/2022 1634 by Sheree Hills, RN  Safety Promotion/Fall Prevention:   safety round/check completed   room organization consistent   activity supervised   clutter free environment maintained   assistive device/personal items within reach   fall prevention program maintained  Taken 10/5/2022 1413 by Sheree Hills, RN  Safety Promotion/Fall Prevention:   room organization consistent   safety round/check completed   activity supervised   assistive device/personal items within reach   clutter free environment maintained  Taken 10/5/2022 1225 by Sheree Hills, RN  Safety Promotion/Fall Prevention:   room organization consistent   safety round/check completed   activity supervised   assistive device/personal items within reach   clutter free environment maintained   fall prevention program maintained  Taken 10/5/2022 1006 by Sheree Hills, RN  Safety Promotion/Fall Prevention:   safety round/check completed   room organization consistent   activity supervised   assistive device/personal items within reach   clutter free environment maintained  Taken 10/5/2022 0853 by Sheree Hills, RN  Safety Promotion/Fall Prevention:   safety round/check completed   room organization consistent   activity supervised   assistive device/personal items  within reach   clutter free environment maintained  Intervention: Prevent Skin Injury  Recent Flowsheet Documentation  Taken 10/5/2022 1634 by Sheree Hills RN  Body Position:   turned   supine  Taken 10/5/2022 1413 by Sheree Hills RN  Body Position:   right   turned  Skin Protection:   adhesive use limited   transparent dressing maintained   tubing/devices free from skin contact  Taken 10/5/2022 1225 by Sheree Hills RN  Body Position:   left   turned  Taken 10/5/2022 1006 by Sheree Hills RN  Body Position:   right   turned  Taken 10/5/2022 0853 by Sheree Hills RN  Body Position: supine  Skin Protection:   adhesive use limited   transparent dressing maintained   tubing/devices free from skin contact  Intervention: Prevent and Manage VTE (Venous Thromboembolism) Risk  Recent Flowsheet Documentation  Taken 10/5/2022 1634 by Sheree Hills RN  Activity Management:   activity adjusted per tolerance   activity encouraged  Taken 10/5/2022 1413 by Sheree Hills RN  Activity Management:   activity adjusted per tolerance   activity encouraged  Taken 10/5/2022 1225 by Sheree Hills RN  Activity Management:   activity adjusted per tolerance   activity encouraged  Taken 10/5/2022 1006 by Sheree Hills RN  Activity Management: activity adjusted per tolerance  Taken 10/5/2022 0853 by Sheree Hills RN  Activity Management:   activity adjusted per tolerance   activity encouraged  Goal: Optimal Comfort and Wellbeing  Outcome: Ongoing, Progressing  Goal: Readiness for Transition of Care  Outcome: Ongoing, Progressing     Problem: Fall Injury Risk  Goal: Absence of Fall and Fall-Related Injury  Outcome: Ongoing, Progressing  Intervention: Identify and Manage Contributors  Recent Flowsheet Documentation  Taken 10/5/2022 1634 by Sheree Hills RN  Medication Review/Management: medications reviewed  Taken 10/5/2022 1413 by Luiz Albert  ZAINAB Bentley  Medication Review/Management: medications reviewed  Taken 10/5/2022 1006 by Sheree Hills RN  Medication Review/Management: medications reviewed  Taken 10/5/2022 0853 by Sheree Hills RN  Medication Review/Management: medications reviewed  Intervention: Promote Injury-Free Environment  Recent Flowsheet Documentation  Taken 10/5/2022 1634 by Sheree Hills RN  Safety Promotion/Fall Prevention:   safety round/check completed   room organization consistent   activity supervised   clutter free environment maintained   assistive device/personal items within reach   fall prevention program maintained  Taken 10/5/2022 1413 by Sheree Hills RN  Safety Promotion/Fall Prevention:   room organization consistent   safety round/check completed   activity supervised   assistive device/personal items within reach   clutter free environment maintained  Taken 10/5/2022 1225 by Sheree Hlils RN  Safety Promotion/Fall Prevention:   room organization consistent   safety round/check completed   activity supervised   assistive device/personal items within reach   clutter free environment maintained   fall prevention program maintained  Taken 10/5/2022 1006 by Sheree Hills RN  Safety Promotion/Fall Prevention:   safety round/check completed   room organization consistent   activity supervised   assistive device/personal items within reach   clutter free environment maintained  Taken 10/5/2022 0853 by Sheree Hills RN  Safety Promotion/Fall Prevention:   safety round/check completed   room organization consistent   activity supervised   assistive device/personal items within reach   clutter free environment maintained     Problem: Skin Injury Risk Increased  Goal: Skin Health and Integrity  Outcome: Ongoing, Progressing  Intervention: Optimize Skin Protection  Recent Flowsheet Documentation  Taken 10/5/2022 1634 by Sheree Hills RN  Head of Bed ([facility])  Positioning: HOB elevated  Taken 10/5/2022 1413 by Sheree Hills RN  Pressure Reduction Techniques:   sit time limited to 2 hours   heels elevated off bed   positioned off wounds  Head of Bed (HOB) Positioning: HOB elevated  Pressure Reduction Devices:   alternating pressure pump (ADD)   positioning supports utilized  Skin Protection:   adhesive use limited   transparent dressing maintained   tubing/devices free from skin contact  Taken 10/5/2022 1225 by Sheree Hills RN  Head of Bed (HOB) Positioning: HOB elevated  Taken 10/5/2022 1006 by Sheree Hills RN  Head of Bed (HOB) Positioning: HOB elevated  Taken 10/5/2022 0853 by Sheree Hills RN  Pressure Reduction Techniques:   sit time limited to 2 hours   weight shift assistance provided   heels elevated off bed   pressure points protected  Head of Bed (HOB) Positioning: HOB elevated  Pressure Reduction Devices: positioning supports utilized  Skin Protection:   adhesive use limited   transparent dressing maintained   tubing/devices free from skin contact     Problem: Infection (Intestinal Obstruction)  Goal: Absence of Infection Signs and Symptoms  Outcome: Ongoing, Progressing     Problem: Pain (Intestinal Obstruction)  Goal: Acceptable Pain Control  Outcome: Ongoing, Progressing        Progress: improving  Outcome Evaluation: VSS. No signs of distress. A&O to self and place, confused/forgetful. Q2T. K+ replaced. H&Hs. BMx1. O2 3-4L NC. Encouraging PO intake

## 2022-10-05 NOTE — THERAPY RE-EVALUATION
Acute Care - Speech Language Pathology   Swallow Re-Evaluation [facility]     Patient Name: Boni Vega  : 1940  MRN: [id number]  Today's Date: 10/5/2022               Admit Date: 10/1/2022    Visit Dx:     ICD-10-CM ICD-9-CM   1. Diverticulitis  K57.92 562.11   2. Sepsis, due to unspecified organism, unspecified whether acute organ dysfunction present ([facility])  A41.9 038.9     995.91   3. Abnormal CT of the chest  R93.89 793.2   4. Abnormal CT of the abdomen  R93.5 793.6     Patient Active Problem List   Diagnosis   • Diverticulitis   • COPD (chronic obstructive pulmonary disease) ([facility])   • Benign essential tremor   • Status post deep brain stimulator placement   • Sigmoid diverticulitis   • Liver lesion   • History of CVA (cerebrovascular accident)   • History of aspiration pneumonia   • Essential hypertension     Past Medical History:   Diagnosis Date   • COPD (chronic obstructive pulmonary disease) ([facility])    • S/P deep brain stimulator placement     essential tremors--15 yrs ago   • Stroke ([facility])     left hand dexterity affected and aspiration issues after--15 yrs ago     Past Surgical History:   Procedure Laterality Date   • CHOLECYSTECTOMY     • HIP FRACTURE SURGERY Left     1 1/2 yrs ago---has 2 large screws in place   • KYPHOPLASTY      1 1/2 yrs ago       SLP Recommendation and Plan  SLP Swallowing Diagnosis: oral dysphagia, suspected pharyngeal dysphagia (10/05/22 1525)  SLP Diet Recommendation: puree, nectar thick liquids (10/05/22 1525)  Recommended Precautions and Strategies: upright posture during/after eating, small bites of food and sips of liquid, no straw, multiple swallows per bite of food, alternate between small bites of food and sips of liquid, hard swallow with each bite or sip (10/05/22 1525)  SLP Rec. for Method of Medication Administration: meds crushed, with pudding or applesauce, as tolerated (10/05/22 1525)     Monitor for Signs of Aspiration: yes, notify SLP if any concerns  (10/05/22 1525)  Recommended Diagnostics: reassess via clinical swallow evaluation, other (see comments) (when dentures available/or when pt stronger) (10/05/22 1525)  Swallow Criteria for Skilled Therapeutic Interventions Met: demonstrates skilled criteria (10/05/22 1525)  Anticipated Discharge Disposition (SLP): unknown (10/05/22 1525)  Rehab Potential/Prognosis, Swallowing: re-evaluate goals as necessary, adequate, monitor progress closely (10/05/22 1525)  Therapy Frequency (Swallow): PRN (10/05/22 1525)  Predicted Duration Therapy Intervention (Days): until discharge (10/05/22 1525)                                            SWALLOW EVALUATION (last 72 hours)     SLP Adult Swallow Evaluation     Row Name 10/05/22 1525 10/03/22 1500                Rehab Evaluation    Document Type re-evaluation  -SL evaluation  -OC       Subjective Information no complaints  -SL no complaints  -OC       Patient Observations alert  -SL alert;cooperative;agree to therapy  pleasantly confused  -OC       Patient/Family/Caregiver Comments/Observations mostly unitelligible speech  -SL Patient not oriented to place/situiation this date  -OC       Patient Effort adequate  -SL good  -OC       Symptoms Noted During/After Treatment none  -SL none  -OC                General Information    Patient Profile Reviewed yes  -SL yes  -OC       Pertinent History Of Current Problem Admitted with diverticulitis, abdominal pain, liver lesion; HX- CVA, COPD, and modified diet  -SL Patient admitted with sigmoid diverticulitis, abdominal pain, liver lesion.  COPD, Hx CVA. Hx dysphagia and mechanical soft/nectar thick liquid diet- also hx of refusing thickened liquids.  -OC       Current Method of Nutrition pureed;nectar/syrup-thick liquids  -SL NPO  -OC       Precautions/Limitations, Vision -- WFL  -OC       Precautions/Limitations, Hearing -- hearing impairment, bilaterally  -OC       Prior Level of Function-Communication unknown  -SL unknown  -OC        Prior Level of Function-Swallowing mechanical soft textures;nectar thick liquids  -SL mechanical soft textures;nectar thick liquids  -OC       Plans/Goals Discussed with patient  -SL patient;agreed upon  -OC       Barriers to Rehab previous functional deficit;medically complex  -SL none identified  -OC       Patient's Goals for Discharge patient did not state  -SL patient could not state  -OC                Pain    Additional Documentation -- Pain Scale: Numbers Pre/Post-Treatment (Group)  -OC                Pain Scale: Numbers Pre/Post-Treatment    Pretreatment Pain Rating -- 0/10 - no pain  -OC       Posttreatment Pain Rating -- 0/10 - no pain  -OC                Oral Motor Structure and Function    Dentition Assessment edentulous;edentulous, does not have dentures  -SL edentulous, dentures not available  -OC       Secretion Management WNL/WFL  -SL WNL/WFL  -OC       Mucosal Quality moist, healthy  -SL moist, healthy  -OC       Volitional Swallow delayed  -SL unable to elicit;other (see comments)  incomplete swallow  -OC       Volitional Cough weak  -SL weak  -OC                Oral Musculature and Cranial Nerve Assessment    Oral Motor General Assessment generalized oral motor weakness  -SL generalized oral motor weakness  -OC       Oral Motor, Comment speech was mostly unitelligible  -SL --                General Eating/Swallowing Observations    Respiratory Support Currently in Use nasal cannula  -SL --       Eating/Swallowing Skills fed by SLP  -SL --       Positioning During Eating upright 90 degree;upright in bed  -SL --       Utensils Used spoon;cup  -SL --       Consistencies Trialed ice chips;thin liquids;pureed;mixed consistency;soft textures;regular textures  -SL --                Clinical Swallow Eval    Clinical Swallow Evaluation Summary no overt clinical s/s aspiration noted on ice chip trials (2/2), however immediate coughing noted on thin lqiuid presentations 92/2)  via spoon, and after mixed soft  solid consistency ( peaches); weak throat clearing noted after trial of regular texture  (cracker);  No overt clinical s/s aspiration noted on nectar thick liquids via small cup sips, or puree. Pt noted to display reduced mastication with soft solids and regular consistencies, in part related to lack of dentures, and oral motor weakness/incoordination. Of note, O2 sats did drop to 91 when pt attempting to chew solids, as he appeared to have difficulty handling the effort and coordination  of respiration with mastication/swallowing;  would recommend that pt remain on puree diet with nectar thick lqiuids for now.  -SL Patient demonstrated unintelligible speech, rated to be 50% intelligible to unfamiliar listener. Patient demonstrated adequate acceptance ice chips and immediate wet coughing with ice chips. Patient demonstrated change in vocal quality with thin liquids. Patient demonstrated adequate acceptance nectar thick via cup and straw. No overt s/s aspiration with nectar thick liquids. Patient demonstrated timely swallow with puree, noted double swallow with puree. Patient demonstrated absent mastication mechanical soft and immediate swallow.  -OC                SLP Evaluation Clinical Impression    SLP Swallowing Diagnosis oral dysphagia;suspected pharyngeal dysphagia  -SL oral dysphagia;suspected pharyngeal dysphagia  -OC       Functional Impact risk of aspiration/pneumonia  -SL risk of aspiration/pneumonia  -OC       Rehab Potential/Prognosis, Swallowing re-evaluate goals as necessary;adequate, monitor progress closely  -SL good, to achieve stated therapy goals  -OC       Swallow Criteria for Skilled Therapeutic Interventions Met demonstrates skilled criteria  -SL demonstrates skilled criteria  -OC                SLP Treatment Clinical Impressions    Barriers to Overall Progress (SLP) Medically complex;Cognitive status  -SL --                Recommendations    Therapy Frequency (Swallow) PRN  -SL PRN  -OC        Predicted Duration Therapy Intervention (Days) until discharge  -SL until discharge  -OC       SLP Diet Recommendation puree;nectar thick liquids  -SL puree;nectar thick liquids  -OC       Recommended Diagnostics reassess via clinical swallow evaluation;other (see comments)  when dentures available/or when pt stronger  -SL --       Recommended Precautions and Strategies upright posture during/after eating;small bites of food and sips of liquid;no straw;multiple swallows per bite of food;alternate between small bites of food and sips of liquid;hard swallow with each bite or sip  -SL upright posture during/after eating;small bites of food and sips of liquid;alternate between small bites of food and sips of liquid;1:1 supervision;assist with feeding  -OC       Oral Care Recommendations Oral Care before breakfast, after meals and PRN  -SL Oral Care BID/PRN  -OC       SLP Rec. for Method of Medication Administration meds crushed;with pudding or applesauce;as tolerated  -SL meds crushed;with pudding or applesauce;as tolerated  -OC       Monitor for Signs of Aspiration yes;notify SLP if any concerns  -SL yes;notify SLP if any concerns  -OC       Anticipated Discharge Disposition (SLP) unknown  -SL unknown  -OC                Swallow Goals (SLP)    Swallow LTGs -- Swallow Long Term Goal (free text)  -OC                (LTG) Swallow    (LTG) Swallow -- Tolerate least restrictive diet with no overt s/s aspiration.  -OC       [place] (Swallow Long Term Goal) -- with 1:1 assist/ supervision  -OC       Time Frame (Swallow Long Term Goal) by discharge  -SL by discharge  -OC       Comment (Swallow Long Term Goal) safe swallow - least restrictive diet  -SL --             User Key  (r) = Recorded By, (t) = Taken By, (c) = Cosigned By    Initials Name Effective Dates    OC Karley Lynch MA,[facility]-SLP 06/16/21 -     SL Carolina Dalton MS CCC-SLP 06/16/21 -                 EDUCATION  The patient has been educated in the following areas:    Cognitive Impairment Communication Impairment Dysphagia (Swallowing Impairment).        SLP GOALS     Row Name 10/05/22 1525 10/03/22 1500          (STG) Swallow 1    (LTG) Swallow -- Tolerate least restrictive diet with no overt s/s aspiration.  -OC     [place] (Swallow Long Term Goal) -- with 1:1 assist/ supervision  -OC     Time Frame (Swallow Long Term Goal) by discharge  -SL by discharge  -OC     Comment (Swallow Long Term Goal) safe swallow - least restrictive diet  -SL --           User Key  (r) = Recorded By, (t) = Taken By, (c) = Cosigned By    Initials Name Provider Type    Karley Robertson MA,CCC-SLP Speech and Language Pathologist    Carolina Chahal MS CCC-SLP Speech and Language Pathologist                   Time Calculation:    Time Calculation- SLP     Row Name 10/05/22 1536             Time Calculation- SLP    SLP Received On 10/05/22  -            User Key  (r) = Recorded By, (t) = Taken By, (c) = Cosigned By    Initials Name Provider Type    Carolina Chahal MS CCC-SLP Speech and Language Pathologist                Therapy Charges for Today     Code Description Service Date Service Provider Modifiers Qty    [id number]  ST EVAL ORAL PHARYNG SWALLOW 4 10/5/2022 aCrolina Dalton MS CCC-SLP GN 1               Carolina Dalton MS CCC-SLP  10/5/2022

## 2022-10-05 NOTE — PLAN OF CARE
Goal Outcome Evaluation:  Plan of Care Reviewed With: patient           Outcome Evaluation: Has slept long intervals. Med x1 for abd pain with good relief. Denies nausea. VSS Weak loose cough. No distress

## 2022-10-05 NOTE — DISCHARGE PLACEMENT REQUEST
"Cecil Vega (81 y.o. Male)             Date of Birth   1940    Social Security Number       Address   [address] [address]    Home Phone   [phone]    MRN   [id number]       [facility]   None    Marital Status   Single                            Admission Date   10/1/22    Admission Type   Emergency    Admitting Provider   Adrianne Gomez MD    Attending Provider   Linden Espinal MD    Department, Room/Bed   [address], [address]       Discharge Date       Discharge Disposition       Discharge Destination                               Attending Provider: Linden Espinal MD    Allergies: No Known Allergies    Isolation: None   Infection: None   Code Status: CPR   Advance Care Planning Activity    Ht: 182.9 cm (72\")   Wt: 81.2 kg (179 lb 0.2 oz)    Admission Cmt: None   Principal Problem: Sigmoid diverticulitis [K57.32]                 Active Insurance as of 10/1/2022     Primary Coverage     Payor Plan Insurance Group Employer/Plan Group    HUMANA MEDICARE REPLACEMENT HUMANA MEDICARE REPLACEMENT [id number]     Payor Plan Address Payor Plan Phone Number Payor Plan Fax Number Effective Dates    [address] [address] [phone]  1/1/2021 - None Entered    [facility] [address]       Subscriber Name Subscriber Birth Date Member ID       CECIL VEGA 1940 [id number]                 Emergency Contacts      (Rel.) Home Phone Work Phone Mobile Phone    caty Sahni (Daughter) -- -- [phone]              "

## 2022-10-05 NOTE — CASE MANAGEMENT/SOCIAL WORK
Continued Stay Note  [facility]     Patient Name: Boni Vega  MRN: [id number]  Today's Date: 10/5/2022    Admit Date: 10/1/2022    Plan: SNF   Discharge Plan     Row Name 10/05/22 1406       Plan    Plan SNF    Patient/Family in Agreement with Plan yes    Plan Comments Spoke with patient daughter Leanna, she is interested in rehab near home, [facility], [facility] and [place]. Pt will need precert and presumably transport. PT/OT eval pending. -CCP will follow, Connie CHILDRESS               Discharge Codes    No documentation.               Expected Discharge Date and Time     Expected Discharge Date Expected Discharge Time    Oct 8, 2022             Connie Dickey RN

## 2022-10-06 LAB
ALBUMIN SERPL-MCNC: 2.4 G/DL (ref 3.5–5.2)
ALBUMIN/GLOB SERPL: 1 G/DL
ALP SERPL-CCNC: 69 U/L (ref 39–117)
ALT SERPL W P-5'-P-CCNC: 74 U/L (ref 1–41)
ANION GAP SERPL CALCULATED.3IONS-SCNC: 6.4 MMOL/L (ref 5–15)
AST SERPL-CCNC: 24 U/L (ref 1–40)
BACTERIA SPEC AEROBE CULT: NORMAL
BACTERIA SPEC AEROBE CULT: NORMAL
BASOPHILS # BLD AUTO: 0.05 10*3/MM3 (ref 0–0.2)
BASOPHILS NFR BLD AUTO: 0.5 % (ref 0–1.5)
BILIRUB SERPL-MCNC: 1 MG/DL (ref 0–1.2)
BUN SERPL-MCNC: 10 MG/DL (ref 8–23)
BUN/CREAT SERPL: 11.8 (ref 7–25)
CALCIUM SPEC-SCNC: 7.8 MG/DL (ref 8.6–10.5)
CHLORIDE SERPL-SCNC: 112 MMOL/L (ref 98–107)
CO2 SERPL-SCNC: 23.6 MMOL/L (ref 22–29)
CREAT SERPL-MCNC: 0.85 MG/DL (ref 0.76–1.27)
DEPRECATED RDW RBC AUTO: 42.2 FL (ref 37–54)
EGFRCR SERPLBLD CKD-EPI 2021: 86.2 ML/MIN/1.73
EOSINOPHIL # BLD AUTO: 0.24 10*3/MM3 (ref 0–0.4)
EOSINOPHIL NFR BLD AUTO: 2.3 % (ref 0.3–6.2)
ERYTHROCYTE [DISTWIDTH] IN BLOOD BY AUTOMATED COUNT: 12.4 % (ref 12.3–15.4)
GLOBULIN UR ELPH-MCNC: 2.4 GM/DL
GLUCOSE SERPL-MCNC: 98 MG/DL (ref 65–99)
HCT VFR BLD AUTO: 23.9 % (ref 37.5–51)
HCT VFR BLD AUTO: 24.1 % (ref 37.5–51)
HGB BLD-MCNC: 8.2 G/DL (ref 13–17.7)
HGB BLD-MCNC: 8.3 G/DL (ref 13–17.7)
IMM GRANULOCYTES # BLD AUTO: 0.05 10*3/MM3 (ref 0–0.05)
IMM GRANULOCYTES NFR BLD AUTO: 0.5 % (ref 0–0.5)
LYMPHOCYTES # BLD AUTO: 0.99 10*3/MM3 (ref 0.7–3.1)
LYMPHOCYTES NFR BLD AUTO: 9.5 % (ref 19.6–45.3)
MCH RBC QN AUTO: 31.8 PG (ref 26.6–33)
MCHC RBC AUTO-ENTMCNC: 34.3 G/DL (ref 31.5–35.7)
MCV RBC AUTO: 92.6 FL (ref 79–97)
MONOCYTES # BLD AUTO: 0.79 10*3/MM3 (ref 0.1–0.9)
MONOCYTES NFR BLD AUTO: 7.6 % (ref 5–12)
NEUTROPHILS NFR BLD AUTO: 79.6 % (ref 42.7–76)
NEUTROPHILS NFR BLD AUTO: 8.29 10*3/MM3 (ref 1.7–7)
NRBC BLD AUTO-RTO: 0.1 /100 WBC (ref 0–0.2)
PLATELET # BLD AUTO: 135 10*3/MM3 (ref 140–450)
PMV BLD AUTO: 10.3 FL (ref 6–12)
POTASSIUM SERPL-SCNC: 3.7 MMOL/L (ref 3.5–5.2)
PROT SERPL-MCNC: 4.8 G/DL (ref 6–8.5)
RBC # BLD AUTO: 2.58 10*6/MM3 (ref 4.14–5.8)
SODIUM SERPL-SCNC: 142 MMOL/L (ref 136–145)
WBC NRBC COR # BLD: 10.41 10*3/MM3 (ref 3.4–10.8)

## 2022-10-06 PROCEDURE — 94664 DEMO&/EVAL PT USE INHALER: CPT

## 2022-10-06 PROCEDURE — 97530 THERAPEUTIC ACTIVITIES: CPT

## 2022-10-06 PROCEDURE — 94799 UNLISTED PULMONARY SVC/PX: CPT

## 2022-10-06 PROCEDURE — 97166 OT EVAL MOD COMPLEX 45 MIN: CPT

## 2022-10-06 PROCEDURE — 85025 COMPLETE CBC W/AUTO DIFF WBC: CPT | Performed by: INTERNAL MEDICINE

## 2022-10-06 PROCEDURE — 92526 ORAL FUNCTION THERAPY: CPT

## 2022-10-06 PROCEDURE — 80053 COMPREHEN METABOLIC PANEL: CPT | Performed by: INTERNAL MEDICINE

## 2022-10-06 PROCEDURE — 99232 SBSQ HOSP IP/OBS MODERATE 35: CPT

## 2022-10-06 PROCEDURE — 99231 SBSQ HOSP IP/OBS SF/LOW 25: CPT | Performed by: SURGERY

## 2022-10-06 PROCEDURE — 97162 PT EVAL MOD COMPLEX 30 MIN: CPT

## 2022-10-06 PROCEDURE — 94760 N-INVAS EAR/PLS OXIMETRY 1: CPT

## 2022-10-06 PROCEDURE — 94761 N-INVAS EAR/PLS OXIMETRY MLT: CPT

## 2022-10-06 PROCEDURE — 25010000002 PIPERACILLIN SOD-TAZOBACTAM PER 1 G: Performed by: STUDENT IN AN ORGANIZED HEALTH CARE EDUCATION/TRAINING PROGRAM

## 2022-10-06 RX ADMIN — PANTOPRAZOLE SODIUM 40 MG: 40 TABLET, DELAYED RELEASE ORAL at 05:43

## 2022-10-06 RX ADMIN — Medication 10 ML: at 20:34

## 2022-10-06 RX ADMIN — IPRATROPIUM BROMIDE AND ALBUTEROL SULFATE 3 ML: 2.5; .5 SOLUTION RESPIRATORY (INHALATION) at 21:07

## 2022-10-06 RX ADMIN — TAZOBACTAM SODIUM AND PIPERACILLIN SODIUM 3.38 G: 375; 3 INJECTION, SOLUTION INTRAVENOUS at 12:00

## 2022-10-06 RX ADMIN — SODIUM CHLORIDE 100 ML/HR: 9 INJECTION, SOLUTION INTRAVENOUS at 05:43

## 2022-10-06 RX ADMIN — DULOXETINE HYDROCHLORIDE 60 MG: 60 CAPSULE, DELAYED RELEASE ORAL at 08:19

## 2022-10-06 RX ADMIN — MONTELUKAST SODIUM 10 MG: 10 TABLET, FILM COATED ORAL at 20:33

## 2022-10-06 RX ADMIN — MULTIPLE VITAMINS W/ MINERALS TAB 1 TABLET: TAB at 08:19

## 2022-10-06 RX ADMIN — TAZOBACTAM SODIUM AND PIPERACILLIN SODIUM 3.38 G: 375; 3 INJECTION, SOLUTION INTRAVENOUS at 20:35

## 2022-10-06 RX ADMIN — TAZOBACTAM SODIUM AND PIPERACILLIN SODIUM 3.38 G: 375; 3 INJECTION, SOLUTION INTRAVENOUS at 05:43

## 2022-10-06 RX ADMIN — IPRATROPIUM BROMIDE AND ALBUTEROL SULFATE 3 ML: 2.5; .5 SOLUTION RESPIRATORY (INHALATION) at 07:05

## 2022-10-06 RX ADMIN — TAMSULOSIN HYDROCHLORIDE 0.4 MG: 0.4 CAPSULE ORAL at 20:34

## 2022-10-06 RX ADMIN — OXYCODONE AND ACETAMINOPHEN 1 TABLET: 5; 325 TABLET ORAL at 02:31

## 2022-10-06 RX ADMIN — IPRATROPIUM BROMIDE AND ALBUTEROL SULFATE 3 ML: 2.5; .5 SOLUTION RESPIRATORY (INHALATION) at 14:59

## 2022-10-06 RX ADMIN — CETIRIZINE HYDROCHLORIDE 5 MG: 10 TABLET ORAL at 08:19

## 2022-10-06 RX ADMIN — IPRATROPIUM BROMIDE AND ALBUTEROL SULFATE 3 ML: 2.5; .5 SOLUTION RESPIRATORY (INHALATION) at 11:13

## 2022-10-06 RX ADMIN — Medication 10 ML: at 08:19

## 2022-10-06 NOTE — THERAPY EVALUATION
Patient Name: Boni Vega  : 1940    MRN: [id number]                              Today's Date: 10/6/2022       Admit Date: 10/1/2022    Visit Dx:     ICD-10-CM ICD-9-CM   1. Diverticulitis  K57.92 562.11   2. Sepsis, due to unspecified organism, unspecified whether acute organ dysfunction present ([facility])  A41.9 038.9     995.91   3. Abnormal CT of the chest  R93.89 793.2   4. Abnormal CT of the abdomen  R93.5 793.6     Patient Active Problem List   Diagnosis   • Diverticulitis   • COPD (chronic obstructive pulmonary disease) ([facility])   • Benign essential tremor   • Status post deep brain stimulator placement   • Sigmoid diverticulitis   • Liver lesion   • History of CVA (cerebrovascular accident)   • History of aspiration pneumonia   • Essential hypertension     Past Medical History:   Diagnosis Date   • COPD (chronic obstructive pulmonary disease) ([facility])    • S/P deep brain stimulator placement     essential tremors--15 yrs ago   • Stroke ([facility])     left hand dexterity affected and aspiration issues after--15 yrs ago     Past Surgical History:   Procedure Laterality Date   • CHOLECYSTECTOMY     • HIP FRACTURE SURGERY Left     1 1/2 yrs ago---has 2 large screws in place   • KYPHOPLASTY      1 1/2 yrs ago      General Information     Row Name 10/06/22 1210          OT Time and Intention    Document Type evaluation  -     Mode of Treatment individual therapy;occupational therapy  -     Row Name 10/06/22 1210          General Information    Patient Profile Reviewed yes  -KA     Prior Level of Function --  Pt poor historian. Unsure of PLOF and no family present at bedside.  -KA     Existing Precautions/Restrictions fall  -     Row Name 10/06/22 1210          Living Environment    People in Home child(amanda), adult  pt reported living alone. Per RN, pt lives with daughter  -     Row Name 10/06/22 1210          Cognition    Orientation Status (Cognition) oriented to;person;verbal cues/prompts needed for  orientation  Reported he was at a truck show  -     Row Name 10/06/22 1210          Safety Issues, Functional Mobility    Safety Issues Affecting Function (Mobility) insight into deficits/self-awareness;awareness of need for assistance;judgment;sequencing abilities;problem-solving  -     Impairments Affecting Function (Mobility) balance;strength;endurance/activity tolerance;cognition  -           User Key  (r) = Recorded By, (t) = Taken By, (c) = Cosigned By    Initials Name Provider Type    Meloyd Tripathi OT Occupational Therapist                 Mobility/ADL's     Row Name 10/06/22 1212          Bed Mobility    Bed Mobility supine-sit;sit-supine  -     Supine-Sit [place] (Bed Mobility) maximum assist (25% patient effort);2 person assist  -     Sit-Supine [place] (Bed Mobility) maximum assist (25% patient effort);2 person assist  -     Comment, (Bed Mobility) cues required throughout for sequencing and initiation of task  -     Row Name 10/06/22 1212          Activities of Daily Living    BADL Assessment/Intervention lower body dressing;grooming;toileting  -     Row Name 10/06/22 1212          Lower Body Dressing Assessment/Training    [place] Level (Lower Body Dressing) lower body dressing skills;doff;don;dependent (less than 25% patient effort);socks  -     Row Name 10/06/22 1212          Grooming Assessment/Training    [place] Level (Grooming) grooming skills;standby assist;wash face, hands  -KA     Position (Grooming) edge of bed sitting  -     Comment, (Grooming) cues required for task initiation and follow through  -     Row Name 10/06/22 1212          Toileting Assessment/Training    [place] Level (Toileting) toileting skills;dependent (less than 25% patient effort);perform perineal hygiene;change pad/brief;adjust/manage clothing  -           User Key  (r) = Recorded By, (t) = Taken By, (c) = Cosigned By    Initials Name Provider Type    KISHOR Villaseñor  ANJANA Oliver Occupational Therapist               Obj/Interventions     Row Name 10/06/22 1214          Sensory Assessment (Somatosensory)    Sensory Assessment (Somatosensory) unable/difficult to assess  -     Row Name 10/06/22 1214          Range of Motion Comprehensive    Comment, General Range of Motion RUE WFL, LUE ~90 degrees  performed sitting up in bed due to fatiguing while EOB  -     Row Name 10/06/22 1214          Strength Comprehensive (MMT)    General Manual Muscle Testing (MMT) Assessment upper extremity strength deficits identified  -     Comment, General Manual Muscle Testing (MMT) Assessment BUE grossly 3/5  -     Row Name 10/06/22 1214          Motor Skills    Motor Skills functional endurance  -     Functional Endurance poor- fatigued quickly sitting EOB  -     Row Name 10/06/22 1214          Balance    Comment, Balance Sitting balance varied while sitting EOB. Initially required max A. Pt would progress at times to SBA with max cues and use of bedrail. Pt then required max A again as he fatigued  -KISHOR           User Key  (r) = Recorded By, (t) = Taken By, (c) = Cosigned By    Initials Name Provider Type    Melody Tripathi, ANJANA Occupational Therapist               Goals/Plan     Row Name 10/06/22 1223          Bathing Goal 1 (OT)    Activity/Device (Bathing Goal 1, OT) bathing skills, all  -KA     [place] Level/Cues Needed (Bathing Goal 1, OT) moderate assist (50-74% patient effort)  -KA     Progress/Outcomes (Bathing Goal 1, OT) goal ongoing  -     Row Name 10/06/22 1223          Toileting Goal 1 (OT)    Activity/Device (Toileting Goal 1, OT) toileting skills, all  -KA     [place] Level/Cues Needed (Toileting Goal 1, OT) moderate assist (50-74% patient effort)  -KA     Progress/Outcome (Toileting Goal 1, OT) goal ongoing  -     Row Name 10/06/22 1223          Grooming Goal 1 (OT)    Activity/Device (Grooming Goal 1, OT) grooming skills, all  -KA     [place] (Grooming  Goal 1, OT) minimum assist (75% or more patient effort)  -KISHOR     Progress/Outcome (Grooming Goal 1, OT) goal ongoing  -KISHOR     Row Name 10/06/22 1223          Therapy Assessment/Plan (OT)    Planned Therapy Interventions (OT) activity tolerance training;functional balance retraining;occupation/activity based interventions;ROM/therapeutic exercise;strengthening exercise;transfer/mobility retraining;patient/caregiver education/training;BADL retraining  -           User Key  (r) = Recorded By, (t) = Taken By, (c) = Cosigned By    Initials Name Provider Type    Melody Tripathi, ANJANA Occupational Therapist               Clinical Impression     Row Name 10/06/22 1217          Pain Assessment    Pretreatment Pain Rating 0/10 - no pain  -     Posttreatment Pain Rating 0/10 - no pain  -KISHOR     Row Name 10/06/22 1217          Plan of Care Review    Plan of Care Reviewed With patient  -KISHOR     Outcome Evaluation Pt seen for OT eval this AM. Pt admitted with c/o abdominal pain, nausea, and vomiting. CT scan showed acute diverticulitis and a liver mass. Hx includes CVA, COPD, degenerative disc disease s/p back sx. Pt with garbled speech today that was present at baseline. Pt A&O to self only. No family present at bedside to obtain PLOF. RN reports pt lived with his daughter. Pt required max Ax2 to transition to sit EOB. Sat EOB minimal time and requested to return to supine. Pt's sitting balance varied. Initially required max A that progressed to SBA with max cues and use of bedrail. Max A with dynamic sitting balance.  Pt SBA for simple grooming task and dep with donning socks and toileting. Pt demonstrates decreased strength, endurance, activity tolerance, balance, functional mobility and ADL performance. Pt to benefit from skilled OT to address deficits and maximize independence with ADLs.  -KISHOR     Row Name 10/06/22 1217          Therapy Assessment/Plan (OT)    Therapy Frequency (OT) 3 times/wk  -KISHOR     Row Name 10/06/22  1217          Vital Signs    Pre Patient Position Supine  -KA     Intra Patient Position Sitting  -KA     Post Patient Position Supine  -KA     Row Name 10/06/22 1217          Positioning and Restraints    Pre-Treatment Position in bed  -KA     Post Treatment Position bed  -KA     In Bed supine;call light within reach;encouraged to call for assist;notified nsg;exit alarm on  -KA           User Key  (r) = Recorded By, (t) = Taken By, (c) = Cosigned By    Initials Name Provider Type    Melody Tripathi OT Occupational Therapist               Outcome Measures     Row Name 10/06/22 1224          How much help from another is currently needed...    Putting on and taking off regular lower body clothing? 1  -KA     Bathing (including washing, rinsing, and drying) 2  -KA     Toileting (which includes using toilet bed pan or urinal) 1  -KA     Putting on and taking off regular upper body clothing 2  -KA     Taking care of personal grooming (such as brushing teeth) 2  -KA     Eating meals 3  -KA     AM-PAC 6 Clicks Score (OT) 11  -KA     Row Name 10/06/22 1224          Functional Assessment    Outcome Measure Options AM-PAC 6 Clicks Daily Activity (OT)  -KA           User Key  (r) = Recorded By, (t) = Taken By, (c) = Cosigned By    Initials Name Provider Type    Melody Tripathi OT Occupational Therapist                Occupational Therapy Education                 Title: PT OT SLP Therapies (Done)     Topic: Occupational Therapy (Done)     Point: ADL training (Done)     Description:   Instruct learner(s) on proper safety adaptation and remediation techniques during self care or transfers.   Instruct in proper use of assistive devices.              Learning Progress Summary           Patient Acceptance, E, VU by KISHOR at 10/6/2022 1225                               User Key     Initials Effective Dates Name Provider Type Roseanne IVERSON 09/22/22 -  Melody Villaseñor OT Occupational Therapist OT              OT  Recommendation and Plan  Planned Therapy Interventions (OT): activity tolerance training, functional balance retraining, occupation/activity based interventions, ROM/therapeutic exercise, strengthening exercise, transfer/mobility retraining, patient/caregiver education/training, BADL retraining  Therapy Frequency (OT): 3 times/wk  Plan of Care Review  Plan of Care Reviewed With: patient  Outcome Evaluation: Pt seen for OT eval this AM. Pt admitted with c/o abdominal pain, nausea, and vomiting. CT scan showed acute diverticulitis and a liver mass. Hx includes CVA, COPD, degenerative disc disease s/p back sx. Pt with garbled speech today that was present at baseline. Pt A&O to self only. No family present at bedside to obtain PLOF. RN reports pt lived with his daughter. Pt required max Ax2 to transition to sit EOB. Sat EOB minimal time and requested to return to supine. Pt's sitting balance varied. Initially required max A that progressed to SBA with max cues and use of bedrail. Max A with dynamic sitting balance.  Pt SBA for simple grooming task and dep with donning socks and toileting. Pt demonstrates decreased strength, endurance, activity tolerance, balance, functional mobility and ADL performance. Pt to benefit from skilled OT to address deficits and maximize independence with ADLs.     Time Calculation:    Time Calculation- OT     Row Name 10/06/22 1225             Time Calculation- OT    OT Start Time 0920  -KA      OT Stop Time 0936  -KA      OT Time Calculation (min) 16 min  -KA      Total Timed Code Minutes- OT 8 minute(s)  -KA      OT Received On 10/06/22  -KA      OT - Next Appointment 10/07/22  -KA      OT Goal Re-Cert Due Date 10/20/22  -KA              Timed Charges    23492 - OT Therapeutic Activity Minutes 8  -KA              Untimed Charges    OT Eval/Re-eval Minutes 8  -KA              Total Minutes    Timed Charges Total Minutes 8  -KA      Untimed Charges Total Minutes 8  -KA       Total Minutes  16  -KA            User Key  (r) = Recorded By, (t) = Taken By, (c) = Cosigned By    Initials Name Provider Type    Melody Tripathi OT Occupational Therapist              Therapy Charges for Today     Code Description Service Date Service Provider Modifiers Qty    [id number]  OT THERAPEUTIC ACT EA 15 MIN 10/6/2022 Melody Villaseñor OT GO 1    [id number]  OT EVAL MOD COMPLEXITY 2 10/6/2022 Melody Villaseñor OT GO 1               Melody Villaseñor OT  10/6/2022

## 2022-10-06 NOTE — PLAN OF CARE
Goal Outcome Evaluation:  Plan of Care Reviewed With: patient           Outcome Evaluation: Pt is an 82 y/o M admitted to [facility] with abdominal pain, N/V. Pt seen for PT/OT co-eval this morning, difficulty obtaining PLOF d/t pt's cognitive status, A&Ox1. RN states pt lives with his daughter at baseline. Unsure of mobility status. Today, pt was maxA x2 for sup<>sit and sit<>sup. Pt needing many VC/TC for sequencing. Once seated EOB, sitting balance ranges from maxA x 1 to SBA. VC's for correct posture, pt demo's posterior lean but improves with cue to bring his nose to therapist's hand. Pt demo's dec'd strength, endurance, and balance and would benefit from skilled PT. Rec D/C to SNF.

## 2022-10-06 NOTE — PROGRESS NOTES
[facility]      Mar/chart reviewed  Follow up left hilar lymphadenopathy and left lung nodule, emphysema  Confused, not able to provide much history    Vital Sign Min/Max for last 24 hours  Temp  Min: 97.9 °F (36.6 °C)  Max: 98.3 °F (36.8 °C)   BP  Min: 123/72  Max: 166/92   Pulse  Min: 73  Max: 100   Resp  Min: 14  Max: 20   SpO2  Min: 90 %  Max: 98 %   Flow (L/min)  Min: 1  Max: 4   No data recorded     Appears ill, sleepy arouses, not oriented  perrl, eomi, normal sclera  mmm, no jvd, trachea midline, neck supple,  chest decreased slightly coarse ae bilaterally, no crackles,   no wheezes,   Tachy, regular  soft, tender, slightly distended, firm ruq +bs,  no c/c/  trace edema  Skin warm, dry no rashes    Labs: 10/6: reviewed:  Wbc 10.4  hgb 8.2  plts 135  Glucose 98  Bun 10  Cr 0.85  Bicarb 23    A/P:  1. COPD with extensive emphysema -- nebs and flutter valve  2. Left upper lobe lung nodule and hilar lymphadenopathy - looks like it will be a difficult biopsy.   3. Hypoxemia   4. Sigmoid diverticulitis  5. Liver lesion--with hemorrhage possible malignant? --decision being made re biopsy

## 2022-10-06 NOTE — PROGRESS NOTES
"Nutrition Services    Patient Name:  Boni Vega  YOB: 1940  MRN: [id number]  Admit Date:  10/1/2022      81 y.o. male admitted for nausea and vomiting. Nutrition following for swallowing dysfunction and poor po intake. Pt is pleasantly confused, unable to answer questions. Unable to obtain consent for NFPE, pt wrapped up in blank. Noticed significant muscle wasting and fat loss in face. Suspect malnutrition.  Encouraged adequate po intake and pt agreed to supplement once diet is advanced. Will continue to follow.       CLINICAL NUTRITION ASSESSMENT      Reason for Assessment NPO/Clear Liquid Status     Diagnosis/Problem   CC: Abdominal pain, vomiting  Dx: Diverticulitis, Sepsis, liver lesion   Medical/Surgical History Past Medical History:   Diagnosis Date   • COPD (chronic obstructive pulmonary disease) ([facility])    • S/P deep brain stimulator placement     essential tremors--15 yrs ago   • Stroke ([facility])     left hand dexterity affected and aspiration issues after--15 yrs ago       Past Surgical History:   Procedure Laterality Date   • CHOLECYSTECTOMY     • HIP FRACTURE SURGERY Left     1 1/2 yrs ago---has 2 large screws in place   • KYPHOPLASTY      1 1/2 yrs ago        Encounter Information        Nutrition/Diet History:     Food Preferences:    Supplements:    Factors Affecting Intake: pain issues, swallow impairment, vomiting     Anthropometrics        Current Height  Current Weight  BMI kg/m2 Height: 182.9 cm (72\")  Weight: 81.2 kg (179 lb 0.2 oz) (10/02/22 1225)  Body mass index is 24.28 kg/m².       Admission Weight 179#   Ideal Body Weight (IBW) 178#   Usual Body Weight (UBW)    Weight Change/Trend        Weight History Wt Readings from Last 30 Encounters:   10/02/22 1225 81.2 kg (179 lb 0.2 oz)   10/01/22 2138 83.9 kg (185 lb)        Tests/Procedures        Tests/Procedures CT scan, X-Ray     Labs       Pertinent Labs    Results from last 7 days   Lab Units 10/06/22  0601 10/05/22  1620 " 10/05/22  0559 10/04/22  0641   SODIUM mmol/L 142  --  141 140   POTASSIUM mmol/L 3.7 3.9 3.3* 3.5   CHLORIDE mmol/L 112*  --  109* 107   CO2 mmol/L 23.6  --  26.0 24.4   BUN mg/dL 10  --  10 8   CREATININE mg/dL 0.85  --  0.77 0.48*   CALCIUM mg/dL 7.8*  --  7.6* 7.9*   BILIRUBIN mg/dL 1.0  --  0.9 1.2   ALK PHOS U/L 69  --  62 70   ALT (SGPT) U/L 74*  --  108* 186*   AST (SGOT) U/L 24  --  36 79*   GLUCOSE mg/dL 98  --  101* 103*     Results from last 7 days   Lab Units 10/06/22  0610 10/05/22  1149 10/05/22  0559 10/03/22  1822 10/03/22  0946   MAGNESIUM mg/dL  --   --   --   --  1.6   PHOSPHORUS mg/dL  --   --  3.0   < > 2.1*   HEMOGLOBIN g/dL 8.2*   < > 7.9*   < > 9.9*   HEMATOCRIT % 23.9*   < > 23.7*   < > 28.0*   WBC 10*3/mm3 10.41  --  10.71   < > 13.18*    < > = values in this interval not displayed.     Results from last 7 days   Lab Units 10/06/22  0610 10/05/22  0559 10/04/22  0641 10/03/22  1249 10/03/22  0946 10/02/22  0525   INR   --   --   --  1.31*  --   --    PLATELETS 10*3/mm3 135* 109* 98*  --  115* 177       No results found for: HGBA1C       Medications           Scheduled Medications cetirizine, 5 mg, Oral, Daily  DULoxetine, 60 mg, Oral, Daily  ipratropium-albuterol, 3 mL, Nebulization, 4x Daily - RT  montelukast, 10 mg, Oral, Nightly  multivitamin with minerals, 1 tablet, Oral, Daily  pantoprazole, 40 mg, Oral, Q AM  piperacillin-tazobactam, 3.375 g, Intravenous, Q8H  sodium chloride, 10 mL, Intravenous, Q12H  tamsulosin, 0.4 mg, Oral, Nightly       Infusions     PRN Medications •  acetaminophen **OR** acetaminophen **OR** acetaminophen  •  calcium carbonate  •  HYDROmorphone  •  ipratropium-albuterol  •  nitroglycerin  •  ondansetron **OR** ondansetron  •  oxyCODONE-acetaminophen  •  [COMPLETED] Insert peripheral IV **AND** sodium chloride  •  sodium chloride     Physical Findings        Physical Appearance disoriented     NFPE Pending, Potential for patient discomfort   --  Edema  no edema  "  Gastrointestinal hypoactive bowel sounds, non-distended , last bowel movement:10/6   Tubes/Drains none   Oral/Mouth Cavity teeth missing   Skin skin tear Left posterior elbow   --  Current Nutrition Orders & Evaluation of Intake       Oral Nutrition     Food Allergies NKFA   Current PO Diet Diet Pureed; Flat / Syrup Thick   Supplement n/a   PO Evaluation     Trending % PO Intake 50%       --  Estimated/Assessed Needs       Energy Requirements    Height for Calculation  Height: 182.9 cm (72\")   Weight for Calculation 81.2 kg (current)   Method for Estimation  25 kcal/kg, 30 kcal/kg   EST Needs (kcal/day) 2030 - 2436 kcal/day       Protein Requirements    Weight for Calculation 81.2 kg (current)   EST Protein Needs (g/kg) 1.0 - 1.2 gm/kg   EST Daily Needs (g/day) 81 - 97 gm Pro/day       Fluid Requirements     Method for Estimation 1 mL/kcal    Estimated Needs (mL/day) 2030 - 2436     PES STATEMENT / NUTRITION DIAGNOSIS      Nutrition Dx Problem  Problem: Predicted Suboptimal Intake  Etiology: Functional Diagnosis and Factors Affecting Nutrition  Signs/Symptoms: Report/Observation    Comment:    --  NUTRITION INTERVENTION      Intervention Goal(s) Meet estimated needs, Establish PO intake, Maintain weight and No significant weight loss         RD Intervention/Action Encourage intake, Follow Tx Progress and Care plan reviewed         Prescription/Orders:       PO Diet       Supplements Magic cup BID (chocolate)      Enteral Nutrition       Parenteral Nutrition    New Prescription Ordered? No, npo   --      Monitor/Evaluation Per protocol, PO intake, Supplement intake, Pertinent labs, Weight   Education Will instruct as appropriate   --    RD to follow per protocol.      Electronically signed by:  Chelly Grossman RD  10/06/22 09:45 EDT  "

## 2022-10-06 NOTE — PLAN OF CARE
Goal Outcome Evaluation:  Plan of Care Reviewed With: patient           Outcome Evaluation: Pt seen for OT eval this AM. Pt admitted with c/o abdominal pain, nausea, and vomiting. CT scan showed acute diverticulitis and a liver mass. Hx includes CVA, COPD, degenerative disc disease s/p back sx. Pt with garbled speech today that was present at baseline. Pt A&O to self only. No family present at bedside to obtain PLOF. RN reports pt lived with his daughter. Pt required max Ax2 to transition to sit EOB. Sat EOB minimal time and requested to return to supine. Pt's sitting balance varied. Initially required max A that progressed to SBA with max cues and use of bedrail. Max A with dynamic sitting balance.  Pt SBA for simple grooming task and dep with donning socks and toileting. Pt demonstrates decreased strength, endurance, activity tolerance, balance, functional mobility and ADL performance. Pt to benefit from skilled OT to address deficits and maximize independence with ADLs.

## 2022-10-06 NOTE — PROGRESS NOTES
Gastroenterology   Inpatient Progress Note    Reason for Follow Up:  Liver lesion, acute diverticulitis    Subjective  Interval History:     Patient denies abdominal pain.  Confused to time and place.  AFP was noted to be 2.35 hemoglobin is relatively stable at 8.2.  LFTs have improved.  He continues to experience difficulty with swallowing thickened liquids nursing staff reports speech has plans to reassess patient.  Denies nausea or vomiting.  Bowel movement overnight free of melena or hematochezia.    Contacted patient's daughter who reports will make final decision on whether or not she would like to proceed with liver biopsy once she is able to speak with patient later this afternoon.          Current Facility-Administered Medications:   •  acetaminophen (TYLENOL) tablet 650 mg, 650 mg, Oral, Q4H PRN, 650 mg at 10/05/22 2032 **OR** acetaminophen (TYLENOL) 160 MG/5ML solution 650 mg, 650 mg, Oral, Q4H PRN **OR** acetaminophen (TYLENOL) suppository 650 mg, 650 mg, Rectal, Q4H PRN, Melanie Pickard APRN  •  calcium carbonate (TUMS) chewable tablet 500 mg (200 mg elemental), 2 tablet, Oral, BID PRN, Melanie Pickard APRN  •  cetirizine (zyrTEC) tablet 5 mg, 5 mg, Oral, Daily, Adrianne Gomez MD, 5 mg at 10/05/22 0851  •  DULoxetine (CYMBALTA) DR capsule 60 mg, 60 mg, Oral, Daily, Adrianne Gomez MD, 60 mg at 10/05/22 0851  •  HYDROmorphone (DILAUDID) injection 0.5 mg, 0.5 mg, Intravenous, Q2H PRN, Adrianne Gomez MD  •  ipratropium-albuterol (DUO-NEB) nebulizer solution 3 mL, 3 mL, Nebulization, Q4H PRN, Melanie Pickard APRN  •  ipratropium-albuterol (DUO-NEB) nebulizer solution 3 mL, 3 mL, Nebulization, Q4H PRN, Tej Bowman MD  •  ipratropium-albuterol (DUO-NEB) nebulizer solution 3 mL, 3 mL, Nebulization, 4x Daily - RT, Lauro Laguna MD, 3 mL at 10/06/22 0705  •  Magnesium Sulfate 2 gram Bolus, followed by 8 gram infusion (total Mg dose 10 grams)- Mg less than or equal  to 1mg/dL, 2 g, Intravenous, PRN **OR** Magnesium Sulfate 2 gram / 50mL Infusion (GIVE X 3 BAGS TO EQUAL 6GM TOTAL DOSE) - Mg 1.1 - 1.5 mg/dl, 2 g, Intravenous, PRN **OR** Magnesium Sulfate 4 gram infusion- Mg 1.6-1.9 mg/dL, 4 g, Intravenous, PRN, Bryant Huber MD  •  montelukast (SINGULAIR) tablet 10 mg, 10 mg, Oral, Nightly, Adrianne Gomez MD, 10 mg at 10/05/22 2023  •  multivitamin with minerals 1 tablet, 1 tablet, Oral, Daily, Adrianne Gomez MD, 1 tablet at 10/05/22 0851  •  nitroglycerin (NITROSTAT) SL tablet 0.4 mg, 0.4 mg, Sublingual, Q5 Min PRN, Melanie Pickard APRN  •  ondansetron (ZOFRAN) tablet 4 mg, 4 mg, Oral, Q6H PRN **OR** ondansetron (ZOFRAN) injection 4 mg, 4 mg, Intravenous, Q6H PRN, Melanie Pickard APRN  •  oxyCODONE-acetaminophen (PERCOCET) 5-325 MG per tablet 1 tablet, 1 tablet, Oral, Q6H PRN, Adrianne Gomez MD, 1 tablet at 10/06/22 0231  •  pantoprazole (PROTONIX) EC tablet 40 mg, 40 mg, Oral, Q AM, Adrianne Gomez MD, 40 mg at 10/06/22 0543  •  Pharmacy to dose vancomycin, , Does not apply, Continuous PRN, Melanie Pickard APRN  •  piperacillin-tazobactam (ZOSYN) 3.375 g in iso-osmotic dextrose 50 ml (premix), 3.375 g, Intravenous, Q8H, Adrianne Gomez MD, Last Rate: 0 mL/hr at 10/03/22 0341, 3.375 g at 10/06/22 0543  •  potassium chloride (K-DUR,KLOR-CON) ER tablet 40 mEq, 40 mEq, Oral, PRN, 40 mEq at 10/05/22 1235 **OR** potassium chloride (KLOR-CON) packet 40 mEq, 40 mEq, Oral, PRN **OR** potassium chloride 10 mEq in 100 mL IVPB, 10 mEq, Intravenous, Q1H PRN, Bryant Huber MD  •  potassium phosphate 45 mmol in sodium chloride 0.9 % 500 mL infusion, 45 mmol, Intravenous, PRN **OR** potassium phosphate 30 mmol in sodium chloride 0.9 % 250 mL infusion, 30 mmol, Intravenous, PRN, Last Rate: 31.3 mL/hr at 10/04/22 1229, 30 mmol at 10/04/22 1229 **OR** potassium phosphate 15 mmol in sodium chloride 0.9 % 100 mL infusion, 15 mmol,  Intravenous, PRN, 15 mmol at 10/03/22 1748 **OR** sodium phosphates 40 mmol in sodium chloride 0.9 % 500 mL IVPB, 40 mmol, Intravenous, PRN **OR** sodium phosphates 20 mmol in sodium chloride 0.9 % 250 mL IVPB, 20 mmol, Intravenous, PRN, Bryant Huber MD  •  [COMPLETED] Insert peripheral IV, , , Once **AND** sodium chloride 0.9 % flush 10 mL, 10 mL, Intravenous, PRN, Jacob Yang III, PA  •  sodium chloride 0.9 % flush 10 mL, 10 mL, Intravenous, Q12H, Melanie Pickard APRN, 10 mL at 10/05/22 2032  •  sodium chloride 0.9 % flush 10 mL, 10 mL, Intravenous, PRN, Melanie Pickard APRN, 10 mL at 10/02/22 1054  •  sodium chloride 0.9 % infusion, 100 mL/hr, Intravenous, Continuous, Adrianne Gomez MD, Last Rate: 100 mL/hr at 10/06/22 0543, 100 mL/hr at 10/06/22 0543  •  tamsulosin (FLOMAX) 24 hr capsule 0.4 mg, 0.4 mg, Oral, Nightly, Adrianne Gomez MD, 0.4 mg at 10/05/22 2023  •  vancomycin 1250 mg/250 mL 0.9% NS IVPB ([facility]), 1,250 mg, Intravenous, Q24H, Melanie Pickard APRN, 1,250 mg at 10/05/22 2149  Review of Systems:               The following systems were reviewed and negative;  gastrointestinal    Objective     Vital Signs  Temp:  [97.9 °F (36.6 °C)-98.3 °F (36.8 °C)] 98.3 °F (36.8 °C)  Heart Rate:  [] 79  Resp:  [14-20] 16  BP: (123-166)/(72-92) 124/72  Body mass index is 24.28 kg/m².                  General Appearance:  in no acute distress, confused and appears frail  Abdomen:  Soft, non-tender, normal bowel sounds; no bruits, organomegaly or masses.                Results Review:                I reviewed the patient's new clinical results.    Results from last 7 days   Lab Units 10/06/22  0610 10/05/22  2353 10/05/22  1838 10/05/22  1149 10/05/22  0559 10/04/22  0739 10/04/22  0641   WBC 10*3/mm3 10.41  --   --   --  10.71  --  12.74*   HEMOGLOBIN g/dL 8.2* 8.3* 9.1*   < > 7.9*   < > 8.8*   HEMATOCRIT % 23.9* 24.1* 27.2*   < > 23.7*   < > 25.9*   PLATELETS  10*3/mm3 135*  --   --   --  109*  --  98*    < > = values in this interval not displayed.     Results from last 7 days   Lab Units 10/06/22  0601 10/05/22  1620 10/05/22  0559 10/04/22  0641   SODIUM mmol/L 142  --  141 140   POTASSIUM mmol/L 3.7 3.9 3.3* 3.5   CHLORIDE mmol/L 112*  --  109* 107   CO2 mmol/L 23.6  --  26.0 24.4   BUN mg/dL 10  --  10 8   CREATININE mg/dL 0.85  --  0.77 0.48*   CALCIUM mg/dL 7.8*  --  7.6* 7.9*   BILIRUBIN mg/dL 1.0  --  0.9 1.2   ALK PHOS U/L 69  --  62 70   ALT (SGPT) U/L 74*  --  108* 186*   AST (SGOT) U/L 24  --  36 79*   GLUCOSE mg/dL 98  --  101* 103*     Results from last 7 days   Lab Units 10/03/22  1249   INR  1.31*     Lab Results   Lab Value Date/Time    LIPASE 13 10/01/2022 1939    LIPASE 11 (L) 06/29/2018 1214       Radiology:  CT Abdomen With & Without Contrast   Final Result   There is increasing hemoperitoneum and hematoma identified today. The CT   findings are most concerning for hepatic lesion that is hemorrhage and   subsequently ruptured. The underlying lesion may represent a   hepatocellular carcinoma. There are additional lesions seen within the   liver as described above. These findings were discussed by telephone   with Dr. Vizcarra at the time of dictation.       Radiation dose reduction techniques were utilized, including automated   exposure control and exposure modulation based on body size.       This report was finalized on 10/5/2022 12:44 PM by Dr. Baldev Kraft M.D.          XR Chest 1 View   Final Result   Nonspecific bibasilar consolidation.       This report was finalized on 10/1/2022 11:00 PM by Dr. Soheila Mann M.D.          CT Abdomen Pelvis With Contrast   Final Result       1. Bibasilar consolidation may reflect atelectasis, although correlation   with any evidence of pneumonia is recommended.   2. Left upper lobe pulmonary nodule, as well as enlarged left hilar   lymph node. Malignancy cannot be excluded.   3. Apparent  heterogeneous lesions within the right lobe of the liver.   Neoplasm versus abscess would be considerations. Full assessment of the   liver lesions is severely limited due to streak artifact and motion   artifact. Liver protocol CT or MRI could be considered for further   assessment.   4. Diverticulosis, as well as a thick walled segment of sigmoid colon.   This may reflect diverticulitis, although follow-up colonoscopy is   suggested to exclude any underlying neoplasm.   5. Patient does have some free fluid tracking along the paracolic   gutters bilaterally. There may be some additional free fluid around the   liver and spleen.       Radiation dose reduction techniques were utilized, including automated   exposure control and exposure modulation based on body size.       This report was finalized on 10/1/2022 10:42 PM by Dr. Soheila Mann M.D.          CT Angiogram Chest   Final Result       1. Bibasilar consolidation may reflect atelectasis, although correlation   with any evidence of pneumonia is recommended.   2. Left upper lobe pulmonary nodule, as well as enlarged left hilar   lymph node. Malignancy cannot be excluded.   3. Apparent heterogeneous lesions within the right lobe of the liver.   Neoplasm versus abscess would be considerations. Full assessment of the   liver lesions is severely limited due to streak artifact and motion   artifact. Liver protocol CT or MRI could be considered for further   assessment.   4. Diverticulosis, as well as a thick walled segment of sigmoid colon.   This may reflect diverticulitis, although follow-up colonoscopy is   suggested to exclude any underlying neoplasm.   5. Patient does have some free fluid tracking along the paracolic   gutters bilaterally. There may be some additional free fluid around the   liver and spleen.       Radiation dose reduction techniques were utilized, including automated   exposure control and exposure modulation based on body size.        This report was finalized on 10/1/2022 10:42 PM by Dr. Soheila Mann M.D.              Assessment & Plan     Patient Active Problem List   Diagnosis   • Diverticulitis   • COPD (chronic obstructive pulmonary disease) (HCC)   • Benign essential tremor   • Status post deep brain stimulator placement   • Sigmoid diverticulitis   • Liver lesion   • History of CVA (cerebrovascular accident)   • History of aspiration pneumonia   • Essential hypertension       Assessment:  1. Liver lesion-CT suggest possible bleeding from liver lesion, not an abscess per radiology  2. Acute diverticulitis  3. Anemia-acute posthemorrhagic  4. Hemoperitoneum  5. Elevated LFTs      Plan:  · Hemoglobin relatively stable-continue to follow closely  · Follow-up AFP-consider CT-guided biopsy of liver lesions depending on AFP level for definitive diagnosis if needed  · Dr. Meyers with general surgery feels that patient is not a surgical candidate secondary to frail health status with multiple comorbidities    I discussed the patients findings and my recommendations with patient, family, nursing staff and consulting provider.          OSMANI Alcala  [facility] [address] [address]

## 2022-10-06 NOTE — THERAPY EVALUATION
Patient Name: Boni Vega  : 1940    MRN: [id number]                              Today's Date: 10/6/2022       Admit Date: 10/1/2022    Visit Dx:     ICD-10-CM ICD-9-CM   1. Diverticulitis  K57.92 562.11   2. Sepsis, due to unspecified organism, unspecified whether acute organ dysfunction present ([facility])  A41.9 038.9     995.91   3. Abnormal CT of the chest  R93.89 793.2   4. Abnormal CT of the abdomen  R93.5 793.6     Patient Active Problem List   Diagnosis   • Diverticulitis   • COPD (chronic obstructive pulmonary disease) ([facility])   • Benign essential tremor   • Status post deep brain stimulator placement   • Sigmoid diverticulitis   • Liver lesion   • History of CVA (cerebrovascular accident)   • History of aspiration pneumonia   • Essential hypertension     Past Medical History:   Diagnosis Date   • COPD (chronic obstructive pulmonary disease) ([facility])    • S/P deep brain stimulator placement     essential tremors--15 yrs ago   • Stroke ([facility])     left hand dexterity affected and aspiration issues after--15 yrs ago     Past Surgical History:   Procedure Laterality Date   • CHOLECYSTECTOMY     • HIP FRACTURE SURGERY Left     1 1/2 yrs ago---has 2 large screws in place   • KYPHOPLASTY      1 1/2 yrs ago      General Information     Row Name 10/06/22 1307          Physical Therapy Time and Intention    Document Type evaluation  -DB     Mode of Treatment physical therapy;co-treatment;occupational therapy  -DB     Row Name 10/06/22 1301          General Information    Patient Profile Reviewed yes  -DB     Prior Level of Function --  pt unable to provide PLOF d/t cognitive status  -DB     Existing Precautions/Restrictions fall  -DB     Barriers to Rehab medically complex  -DB     Row Name 10/06/22 1307          Living Environment    People in Home child(amanda), adult  per RN, pt lives with his daughter  -DB     Row Name 10/06/22 1307          Cognition    Orientation Status (Cognition) oriented to;person  -DB     Row  Name 10/06/22 1307          Safety Issues, Functional Mobility    Safety Issues Affecting Function (Mobility) awareness of need for assistance;judgment;sequencing abilities;problem-solving;insight into deficits/self-awareness  -DB     Impairments Affecting Function (Mobility) balance;strength;endurance/activity tolerance;cognition  -DB     Cognitive Impairments, Mobility Safety/Performance awareness, need for assistance;insight into deficits/self-awareness;judgment;problem-solving/reasoning;sequencing abilities  -DB           User Key  (r) = Recorded By, (t) = Taken By, (c) = Cosigned By    Initials Name Provider Type    DB Jaclyn Fang PT Physical Therapist               Mobility     Row Name 10/06/22 1309          Bed Mobility    Bed Mobility supine-sit;sit-supine;scooting/bridging  -DB     Scooting/Bridging [place] (Bed Mobility) dependent (less than 25% patient effort);2 person assist  -DB     Supine-Sit [place] (Bed Mobility) maximum assist (25% patient effort);2 person assist;verbal cues  -DB     Sit-Supine [place] (Bed Mobility) maximum assist (25% patient effort);2 person assist;verbal cues  -DB     Assistive Device (Bed Mobility) bed rails;draw sheet;head of bed elevated  -DB     Row Name 10/06/22 1309          Sit-Stand Transfer    Sit-Stand [place] (Transfers) unable to assess  -DB     Row Name 10/06/22 1309          Gait/Stairs (Locomotion)    [place] Level (Gait) unable to assess  -DB           User Key  (r) = Recorded By, (t) = Taken By, (c) = Cosigned By    Initials Name Provider Type    DB Jaclyn Fang PT Physical Therapist               Obj/Interventions     Row Name 10/06/22 1310          Range of Motion Comprehensive    General Range of Motion no range of motion deficits identified  -DB     Row Name 10/06/22 1310          Strength Comprehensive (MMT)    Comment, General Manual Muscle Testing (MMT) Assessment BLE ~3/5 MMT  -DB     Row Name 10/06/22 1310           Balance    Balance Assessment sitting static balance;sitting dynamic balance  -DB     Static Sitting Balance standby assist;maximum assist;verbal cues  -DB     Dynamic Sitting Balance maximum assist;verbal cues  -DB     Position, Sitting Balance unsupported;sitting edge of bed  -DB     Comment, Balance sitting balance ranging from SBA to maxA. Needing max VC/TC to correcting sitting posture, fatigues very quickly.  -DB           User Key  (r) = Recorded By, (t) = Taken By, (c) = Cosigned By    Initials Name Provider Type    DB Jaclyn Fang, PT Physical Therapist               Goals/Plan     Row Name 10/06/22 1325          Bed Mobility Goal 1 (PT)    Activity/Assistive Device (Bed Mobility Goal 1, PT) bed mobility activities, all  -DB     [place] Level/Cues Needed (Bed Mobility Goal 1, PT) moderate assist (50-74% patient effort)  -DB     Time Frame (Bed Mobility Goal 1, PT) 1 week  -DB     Row Name 10/06/22 1325          Transfer Goal 1 (PT)    Activity/Assistive Device (Transfer Goal 1, PT) transfers, all  -DB     [place] Level/Cues Needed (Transfer Goal 1, PT) moderate assist (50-74% patient effort)  -DB     Time Frame (Transfer Goal 1, PT) 1 week  -DB     Row Name 10/06/22 1325          Therapy Assessment/Plan (PT)    Planned Therapy Interventions (PT) balance training;bed mobility training;gait training;home exercise program;neuromuscular re-education;patient/family education;transfer training;strengthening  -DB           User Key  (r) = Recorded By, (t) = Taken By, (c) = Cosigned By    Initials Name Provider Type    Jaclyn Merrill PT Physical Therapist               Clinical Impression     Row Name 10/06/22 1311          Pain    Pain Intervention(s) Ambulation/increased activity;Repositioned  -DB     Row Name 10/06/22 1311          Plan of Care Review    Plan of Care Reviewed With patient  -DB     Outcome Evaluation Pt is an 80 y/o M admitted to [facility] with abdominal pain, N/V. Pt seen for PT/OT  co-eval this morning, difficulty obtaining PLOF d/t pt's cognitive status, A&Ox1. RN states pt lives with his daughter at baseline. Unsure of mobility status. Today, pt was maxA x2 for sup<>sit and sit<>sup. Pt needing many VC/TC for sequencing. Once seated EOB, sitting balance ranges from maxA x 1 to SBA. VC's for correct posture, pt dany's posterior lean but improves with cue to bring his nose to therapist's hand. Pt demo's dec'd strength, endurance, and balance and would benefit from skilled PT. Rec D/C to SNF.  -DB     Row Name 10/06/22 1311          Therapy Assessment/Plan (PT)    Rehab Potential (PT) fair, will monitor progress closely  -DB     Criteria for Skilled Interventions Met (PT) yes  -DB     Therapy Frequency (PT) 5 times/wk  -DB     Row Name 10/06/22 1311          Vital Signs    O2 Delivery Pre Treatment room air  -DB     O2 Delivery Intra Treatment room air  -DB     O2 Delivery Post Treatment room air  -DB     Pre Patient Position Supine  -DB     Intra Patient Position Sitting  -DB     Post Patient Position Supine  -DB     Row Name 10/06/22 1311          Positioning and Restraints    Pre-Treatment Position in bed  -DB     Post Treatment Position bed  -DB     In Bed supine;call light within reach;encouraged to call for assist;exit alarm on;notified nsg  -DB           User Key  (r) = Recorded By, (t) = Taken By, (c) = Cosigned By    Initials Name Provider Type    Jaclyn Merrill PT Physical Therapist               Outcome Measures     Row Name 10/06/22 8512          How much help from another person do you currently need...    Turning from your back to your side while in flat bed without using bedrails? 2  -DB     Moving from lying on back to sitting on the side of a flat bed without bedrails? 2  -DB     Moving to and from a bed to a chair (including a wheelchair)? 1  -DB     Standing up from a chair using your arms (e.g., wheelchair, bedside chair)? 1  -DB     Climbing 3-5 steps with a railing?  1  -DB     To walk in hospital room? 1  -DB     AM-PAC 6 Clicks Score (PT) 8  -DB     Highest level of mobility 3 --> Sat at edge of bed  -DB     Row Name 10/06/22 1326 10/06/22 1224       Functional Assessment    Outcome Measure Options AM-PAC 6 Clicks Basic Mobility (PT)  -DB AM-PAC 6 Clicks Daily Activity (OT)  -KISHOR          User Key  (r) = Recorded By, (t) = Taken By, (c) = Cosigned By    Initials Name Provider Type    DB Jaclyn Fang, PT Physical Therapist    Melody Tripathi, OT Occupational Therapist                             Physical Therapy Education                 Title: PT OT SLP Therapies (In Progress)     Topic: Physical Therapy (In Progress)     Point: Mobility training (In Progress)     Learning Progress Summary           Patient Acceptance, E, NR by DB at 10/6/2022 1327                   Point: Home exercise program (In Progress)     Learning Progress Summary           Patient Acceptance, E, NR by DB at 10/6/2022 1327                   Point: Body mechanics (In Progress)     Learning Progress Summary           Patient Acceptance, E, NR by DB at 10/6/2022 1327                   Point: Precautions (In Progress)     Learning Progress Summary           Patient Acceptance, E, NR by DB at 10/6/2022 1327                               User Key     Initials Effective Dates Name Provider Type Discipline    DB 06/16/21 -  Jaclyn Fang, PT Physical Therapist PT              PT Recommendation and Plan  Planned Therapy Interventions (PT): balance training, bed mobility training, gait training, home exercise program, neuromuscular re-education, patient/family education, transfer training, strengthening  Plan of Care Reviewed With: patient  Outcome Evaluation: Pt is an 80 y/o M admitted to [facility] with abdominal pain, N/V. Pt seen for PT/OT co-eval this morning, difficulty obtaining PLOF d/t pt's cognitive status, A&Ox1. RN states pt lives with his daughter at baseline. Unsure of mobility status. Today, pt  was maxA x2 for sup<>sit and sit<>sup. Pt needing many VC/TC for sequencing. Once seated EOB, sitting balance ranges from maxA x 1 to SBA. VC's for correct posture, pt dany's posterior lean but improves with cue to bring his nose to therapist's hand. Pt dany's dec'd strength, endurance, and balance and would benefit from skilled PT. Rec D/C to SNF.     Time Calculation:    PT Charges     Row Name 10/06/22 1328             Time Calculation    Start Time 0920  -DB      Stop Time 0936  -DB      Time Calculation (min) 16 min  -DB      PT Received On 10/06/22  -DB      PT - Next Appointment 10/07/22  -DB      PT Goal Re-Cert Due Date 10/13/22  -DB              Time Calculation- PT    Total Timed Code Minutes- PT 8 minute(s)  -DB            User Key  (r) = Recorded By, (t) = Taken By, (c) = Cosigned By    Initials Name Provider Type    DB Jaclyn Fang, PT Physical Therapist              Therapy Charges for Today     Code Description Service Date Service Provider Modifiers Qty    76484718475 HC PT EVAL MOD COMPLEXITY 3 10/6/2022 Jaclyn Fang, PT GP 1    48310973555 HC PT THERAPEUTIC ACT EA 15 MIN 10/6/2022 Jaclyn Fang, PT GP 1          PT G-Codes  Outcome Measure Options: AM-PAC 6 Clicks Basic Mobility (PT)  AM-PAC 6 Clicks Score (PT): 8  AM-PAC 6 Clicks Score (OT): 11    Jaclyn Fang PT  10/6/2022

## 2022-10-06 NOTE — PROGRESS NOTES
The patient remained stable and has clinically improved regarding the degree of his abdominal pain.    I reviewed Dr. Sheldon's note and agree with the plan as outlined after discussing the situation with his daughter.  I tried to reach out to his daughter again but could not reach her.    The acute diverticulitis has essentially resolved.    I will continue to follow peripherally regarding the liver mass but he is not a surgical candidate for the liver process.

## 2022-10-06 NOTE — THERAPY RE-EVALUATION
Acute Care - Speech Language Pathology   Swallow Initial Evaluation [facility]     Patient Name: Boni Vega  : 1940  MRN: [id number]  Today's Date: 10/6/2022               Admit Date: 10/1/2022    Visit Dx:     ICD-10-CM ICD-9-CM   1. Diverticulitis  K57.92 562.11   2. Sepsis, due to unspecified organism, unspecified whether acute organ dysfunction present ([facility])  A41.9 038.9     995.91   3. Abnormal CT of the chest  R93.89 793.2   4. Abnormal CT of the abdomen  R93.5 793.6     Patient Active Problem List   Diagnosis   • Diverticulitis   • COPD (chronic obstructive pulmonary disease) ([facility])   • Benign essential tremor   • Status post deep brain stimulator placement   • Sigmoid diverticulitis   • Liver lesion   • History of CVA (cerebrovascular accident)   • History of aspiration pneumonia   • Essential hypertension     Past Medical History:   Diagnosis Date   • COPD (chronic obstructive pulmonary disease) ([facility])    • S/P deep brain stimulator placement     essential tremors--15 yrs ago   • Stroke ([facility])     left hand dexterity affected and aspiration issues after--15 yrs ago     Past Surgical History:   Procedure Laterality Date   • CHOLECYSTECTOMY     • HIP FRACTURE SURGERY Left     1 1/2 yrs ago---has 2 large screws in place   • KYPHOPLASTY      1 1/2 yrs ago       SLP Recommendation and Plan  SLP Swallowing Diagnosis: oral dysphagia, suspected pharyngeal dysphagia (10/06/22 1100)  SLP Diet Recommendation: NPO (10/06/22 1100)     SLP Rec. for Method of Medication Administration: meds crushed, with pudding or applesauce, as tolerated (10/06/22 1100)     Monitor for Signs of Aspiration: yes, notify SLP if any concerns (10/06/22 1100)  Recommended Diagnostics: other (see comments) (re-eval pending goals of care) (10/06/22 1100)  Swallow Criteria for Skilled Therapeutic Interventions Met: demonstrates skilled criteria (10/06/22 1100)  Anticipated Discharge Disposition (SLP): unknown (10/06/22 1100)  Rehab  Potential/Prognosis, Swallowing: re-evaluate goals as necessary (10/06/22 1100)  Therapy Frequency (Swallow): PRN (10/06/22 1100)  Predicted Duration Therapy Intervention (Days): until discharge (10/06/22 1100)                                     Plan of Care Reviewed With: patient  Outcome Evaluation: Re-eval of swallow completed secondary to RN concern for patient ability to tolerate puree/nectar thick liquids. Patient demonstrated overt s/s aspiration with nectar thick via spoon, honey thick via spoon/cup, and puree. Recommend NPO with vital meds crushed with puree at this time. Recommend requent oral care with suction. Do not feel VFSS warranted seconary to severity of swallow and wet/gurgly vocal quality/breathing s/p trials of most restrictive textures. SLP to follow for re-eval and goals of care.      SWALLOW EVALUATION (last 72 hours)     SLP Adult Swallow Evaluation     Row Name 10/06/22 1100 10/05/22 1525 10/03/22 1500             Rehab Evaluation    Document Type re-evaluation  -OC re-evaluation  -SL evaluation  -OC      Subjective Information no complaints  -OC no complaints  -SL no complaints  -OC      Patient Observations alert;cooperative;agree to therapy  -OC alert  -SL alert;cooperative;agree to therapy  pleasantly confused  -OC      Patient/Family/Caregiver Comments/Observations -- mostly unitelligible speech  -SL Patient not oriented to place/situiation this date  -OC      Patient Effort adequate  -OC adequate  -SL good  -OC      Symptoms Noted During/After Treatment none  -OC none  -SL none  -OC              General Information    Patient Profile Reviewed yes  -OC yes  -SL yes  -OC      Pertinent History Of Current Problem -- Admitted with diverticulitis, abdominal pain, liver lesion; HX- CVA, COPD, and modified diet  -SL Patient admitted with sigmoid diverticulitis, abdominal pain, liver lesion.  COPD, Hx CVA. Hx dysphagia and mechanical soft/nectar thick liquid diet- also hx of refusing  thickened liquids.  -OC      Current Method of Nutrition pureed;nectar/syrup-thick liquids  -OC pureed;nectar/syrup-thick liquids  -SL NPO  -OC      Precautions/Limitations, Vision WFL  -OC -- WFL  -OC      Precautions/Limitations, Hearing hearing impairment, bilaterally  -OC -- hearing impairment, bilaterally  -OC      Prior Level of Function-Communication unknown  -OC unknown  -SL unknown  -OC      Prior Level of Function-Swallowing unknown  -OC mechanical soft textures;nectar thick liquids  -SL mechanical soft textures;nectar thick liquids  -OC      Plans/Goals Discussed with patient  -OC patient  -SL patient;agreed upon  -OC      Barriers to Rehab previous functional deficit;medically complex  -OC previous functional deficit;medically complex  -SL none identified  -OC      Patient's Goals for Discharge patient did not state  -OC patient did not state  -SL patient could not state  -OC              Pain    Additional Documentation -- -- Pain Scale: Numbers Pre/Post-Treatment (Group)  -OC              Pain Scale: Numbers Pre/Post-Treatment    Pretreatment Pain Rating 0/10 - no pain  -OC -- 0/10 - no pain  -OC      Posttreatment Pain Rating 0/10 - no pain  -OC -- 0/10 - no pain  -OC              Oral Motor Structure and Function    Dentition Assessment edentulous, does not have dentures  -OC edentulous;edentulous, does not have dentures  -SL edentulous, dentures not available  -OC      Secretion Management WNL/WFL  -OC WNL/WFL  -SL WNL/WFL  -OC      Mucosal Quality moist, healthy  -OC moist, healthy  -SL moist, healthy  -OC      Volitional Swallow delayed  -OC delayed  -SL unable to elicit;other (see comments)  incomplete swallow  -OC      Volitional Cough weak  -OC weak  -SL weak  -OC              Oral Musculature and Cranial Nerve Assessment    Oral Motor General Assessment generalized oral motor weakness  -OC generalized oral motor weakness  -SL generalized oral motor weakness  -OC      Oral Motor, Comment --  speech was mostly unitelligible  -SL --              General Eating/Swallowing Observations    Respiratory Support Currently in Use nasal cannula  -OC nasal cannula  -SL --      Eating/Swallowing Skills fed by SLP  -OC fed by SLP  -SL --      Positioning During Eating upright 90 degree;upright in bed  -OC upright 90 degree;upright in bed  -SL --      Utensils Used spoon;cup  -OC spoon;cup  -SL --      Consistencies Trialed nectar/syrup-thick liquids;honey-thick liquids;pureed  -OC ice chips;thin liquids;pureed;mixed consistency;soft textures;regular textures  -SL --              Clinical Swallow Eval    Clinical Swallow Evaluation Summary Patient demonstrated delayed coughing and wet vocal quality with nectar thick via cup and spoon. Patient with continuous throat clearing, eventually able to clear wetness. Patient demonstrated delayed wet vocal quality and coughing with honey thick via spoon. Again, patient required extended recovery time and continuous throat clearing. Patient demonstrated adequate acceptance puree, delayed throat clear. Multiple swallows noted, no change in vocal quality with puree. Concern for pharyngeal residue with puree, unable to tolerate honey thick or nectar thick liquid wash to aid in clearance.  -OC no overt clinical s/s aspiration noted on ice chip trials (2/2), however immediate coughing noted on thin lqiuid presentations 92/2)  via spoon, and after mixed soft solid consistency ( peaches); weak throat clearing noted after trial of regular texture  (cracker);  No overt clinical s/s aspiration noted on nectar thick liquids via small cup sips, or puree. Pt noted to display reduced mastication with soft solids and regular consistencies, in part related to lack of dentures, and oral motor weakness/incoordination. Of note, O2 sats did drop to 91 when pt attempting to chew solids, as he appeared to have difficulty handling the effort and coordination  of respiration with  mastication/swallowing;  would recommend that pt remain on puree diet with nectar thick lqiuids for now.  -SL Patient demonstrated unintelligible speech, rated to be 50% intelligible to unfamiliar listener. Patient demonstrated adequate acceptance ice chips and immediate wet coughing with ice chips. Patient demonstrated change in vocal quality with thin liquids. Patient demonstrated adequate acceptance nectar thick via cup and straw. No overt s/s aspiration with nectar thick liquids. Patient demonstrated timely swallow with puree, noted double swallow with puree. Patient demonstrated absent mastication mechanical soft and immediate swallow.  -OC              SLP Evaluation Clinical Impression    SLP Swallowing Diagnosis oral dysphagia;suspected pharyngeal dysphagia  -OC oral dysphagia;suspected pharyngeal dysphagia  -SL oral dysphagia;suspected pharyngeal dysphagia  -OC      Functional Impact risk of aspiration/pneumonia  -OC risk of aspiration/pneumonia  -SL risk of aspiration/pneumonia  -OC      Rehab Potential/Prognosis, Swallowing re-evaluate goals as necessary  -OC re-evaluate goals as necessary;adequate, monitor progress closely  -SL good, to achieve stated therapy goals  -OC      Swallow Criteria for Skilled Therapeutic Interventions Met demonstrates skilled criteria  -OC demonstrates skilled criteria  -SL demonstrates skilled criteria  -OC              SLP Treatment Clinical Impressions    Barriers to Overall Progress (SLP) -- Medically complex;Cognitive status  -SL --              Recommendations    Therapy Frequency (Swallow) PRN  -OC PRN  -SL PRN  -OC      Predicted Duration Therapy Intervention (Days) until discharge  -OC until discharge  -SL until discharge  -OC      SLP Diet Recommendation NPO  -OC puree;nectar thick liquids  -SL puree;nectar thick liquids  -OC      Recommended Diagnostics other (see comments)  re-eval pending goals of care  -OC reassess via clinical swallow evaluation;other (see  comments)  when dentures available/or when pt stronger  -SL --      Recommended Precautions and Strategies -- upright posture during/after eating;small bites of food and sips of liquid;no straw;multiple swallows per bite of food;alternate between small bites of food and sips of liquid;hard swallow with each bite or sip  -SL upright posture during/after eating;small bites of food and sips of liquid;alternate between small bites of food and sips of liquid;1:1 supervision;assist with feeding  -OC      Oral Care Recommendations Oral Care before breakfast, after meals and PRN  -OC Oral Care before breakfast, after meals and PRN  -SL Oral Care BID/PRN  -OC      SLP Rec. for Method of Medication Administration meds crushed;with pudding or applesauce;as tolerated  -OC meds crushed;with pudding or applesauce;as tolerated  -SL meds crushed;with pudding or applesauce;as tolerated  -OC      Monitor for Signs of Aspiration yes;notify SLP if any concerns  -OC yes;notify SLP if any concerns  -SL yes;notify SLP if any concerns  -OC      Anticipated Discharge Disposition (SLP) unknown  -OC unknown  -SL unknown  -OC              Swallow Goals (SLP)    Swallow LTGs -- -- Swallow Long Term Goal (free text)  -OC              (LTG) Swallow    (LTG) Swallow -- -- Tolerate least restrictive diet with no overt s/s aspiration.  -OC      [place] (Swallow Long Term Goal) -- -- with 1:1 assist/ supervision  -OC      Time Frame (Swallow Long Term Goal) -- by discharge  -SL by discharge  -OC      Comment (Swallow Long Term Goal) -- safe swallow - least restrictive diet  -SL --            User Key  (r) = Recorded By, (t) = Taken By, (c) = Cosigned By    Initials Name Effective Dates    OC Karley Lynch MA,[facility]-SLP 06/16/21 -     SL Carolina Dalton MS CCC-SLP 06/16/21 -                 EDUCATION  The patient has been educated in the following areas:   Dysphagia (Swallowing Impairment).        SLP GOALS     Row Name 10/05/22 1525 10/03/22 1500           (STG) Swallow 1    (LTG) Swallow -- Tolerate least restrictive diet with no overt s/s aspiration.  -OC     [place] (Swallow Long Term Goal) -- with 1:1 assist/ supervision  -OC     Time Frame (Swallow Long Term Goal) by discharge  -SL by discharge  -OC     Comment (Swallow Long Term Goal) safe swallow - least restrictive diet  -SL --           User Key  (r) = Recorded By, (t) = Taken By, (c) = Cosigned By    Initials Name Provider Type    Karley Robertson MA,[facility]-SLP Speech and Language Pathologist    Carolina Chahal, MS CCC-SLP Speech and Language Pathologist                   Time Calculation:    Time Calculation- SLP     Row Name 10/06/22 1421             Time Calculation- SLP    SLP Start Time 1100  -OC      SLP Received On 10/06/22  -OC              Untimed Charges    SLP Treatment ST Treatment Swallow Minutes  - 57513  -OC      [id number] Treatment Swallow Minutes 45  -OC              Total Minutes    Untimed Charges Total Minutes 45  -OC       Total Minutes 45  -OC            User Key  (r) = Recorded By, (t) = Taken By, (c) = Cosigned By    Initials Name Provider Type    Karley Robertson MA,[facility]-SLP Speech and Language Pathologist                Therapy Charges for Today     Code Description Service Date Service Provider Modifiers Qty    [id number]  ST TREATMENT SWALLOW 3 10/6/2022 Karley Lynch MA,CCC-SLP GN 1               Karley Lynch MA,SHAY-SURENDRA  10/6/2022

## 2022-10-06 NOTE — PROGRESS NOTES
"    DAILY PROGRESS NOTE  [facility]    Patient Identification:  Name: Boni Vega  Age: 81 y.o.  Sex: male  :  1940  MRN: [id number]         Primary Care Physician: Radha Mata    Subjective:  Interval History: He is not complaining about exorbitant amounts of abdominal pain.  The breakfast tray just came and he is interested in eating.  He denies any emesis.  He is denying any current abdominal pain.  He also denies any confusion chest pain or nausea.    Objective: Elderly frail.  Nontoxic in appearance though not thriving.  No family at bedside    Scheduled Meds:cetirizine, 5 mg, Oral, Daily  DULoxetine, 60 mg, Oral, Daily  ipratropium-albuterol, 3 mL, Nebulization, 4x Daily - RT  montelukast, 10 mg, Oral, Nightly  multivitamin with minerals, 1 tablet, Oral, Daily  pantoprazole, 40 mg, Oral, Q AM  piperacillin-tazobactam, 3.375 g, Intravenous, Q8H  sodium chloride, 10 mL, Intravenous, Q12H  tamsulosin, 0.4 mg, Oral, Nightly  vancomycin, 1,250 mg, Intravenous, Q24H      Continuous Infusions:Pharmacy to dose vancomycin,   sodium chloride, 100 mL/hr, Last Rate: 100 mL/hr (10/06/22 0543)        Vital signs in last 24 hours:  Temp:  [97.9 °F (36.6 °C)-98.3 °F (36.8 °C)] 98.3 °F (36.8 °C)  Heart Rate:  [] 79  Resp:  [14-20] 16  BP: (123-166)/(72-92) 124/72    Intake/Output:    Intake/Output Summary (Last 24 hours) at 10/6/2022 0837  Last data filed at 10/6/2022 0543  Gross per 24 hour   Intake 2892 ml   Output 150 ml   Net 2742 ml       Exam:  /72 (BP Location: Left arm, Patient Position: Lying)   Pulse 79   Temp 98.3 °F (36.8 °C) (Oral)   Resp 16   Ht 182.9 cm (72\")   Wt 81.2 kg (179 lb 0.2 oz)   SpO2 95%   BMI 24.28 kg/m²     General Appearance:    Alert, cooperative/follows commands, AAOx3 for me                          Head:    Normocephalic, without obvious abnormality, atraumatic                           Eyes:    Conjunctivae/corneas clear -nonicteric, EOM's " intact, both eyes                         Throat:   Oral mucosa pink and moist                           Neck:   Supple, no rigidity or JVD                         Lungs:    Decreased to auscultation bilaterally, respirations unlabored -no use of  muscles                 Chest Wall:    No tenderness or deformity                          Heart:    Regular rate and rhythm, S1 and S2 normal                  Abdomen:     Soft, nontender for me even over liver with no rebound or guarding, bowel sounds positive                 Extremities:   Generalized weakness, no cyanosis or edema                        Pulses:   Pulses palpable in all extremities                            Skin:   Skin is warm and dry, nonjaundiced                  Neurologic:   CNII-XII intact     Data Review:  Labs in chart were reviewed.    Assessment:  Active Hospital Problems    Diagnosis  POA   • **Sigmoid diverticulitis [K57.32]  Unknown   • COPD (chronic obstructive pulmonary disease) ([facility]) [J44.9]  Unknown   • Benign essential tremor [G25.0]  Unknown   • 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       Plan:    Acute diverticulitis - resolving -DC vancomycin.  Continue Zosyn -change to p.o. soon -afebrile/normal white count    Anemia chronic disease/TCP -H&H remaining overall stable -DC every 6 checks   -TCP nearly resolved    Multiple liver lesions   -Patient amenable to biopsy -while quite frail and not likely qualifying for any treatment and already deemed not really a surgical candidate, he is still oriented and is not opposed to a biopsy.  I have reached out discussed the case with the daughter over the  phone who claims to be his healthcare surrogate and main caretaker and reached her at [phone].  Discussed the case and she seems to understand all.  She would not promote the biopsy if he is not can qualify for treatment and I agree with that thought process.  She is going to further come into the hospital today and discussed with the patient at length and will get back to us on whether or not to proceed with biopsy.   -AFP surprisingly normal.  Liver function not terrible with normal bilirubin   -Appreciate surgical and GI assistance    COPD/hypoxemia with left upper lobe nodule/hilar lymphadenopathy   -Pulmonology consulted and following -lesions not really amenable to biopsy due to location    HTN stable    Essential tremor with deep brain stimulator    Past history of CVA    Dietary changes with puréed diet per speech -patient claims to eat a regular diet at home    >30m spent counseling and coordinating care         Nazario Sheldon MD  10/6/2022  08:37 EDT

## 2022-10-06 NOTE — PLAN OF CARE
Goal Outcome Evaluation:  Plan of Care Reviewed With: patient           Outcome Evaluation: Re-eval of swallow completed secondary to RN concern for patient ability to tolerate puree/nectar thick liquids. Patient demonstrated overt s/s aspiration with nectar thick via spoon, honey thick via spoon/cup, and puree. Recommend NPO with vital meds crushed with puree at this time. Recommend requent oral care with suction. Do not feel VFSS warranted seconary to severity of swallow and wet/gurgly vocal quality/breathing s/p trials of most restrictive textures. SLP to follow for re-eval and goals of care.

## 2022-10-06 NOTE — PLAN OF CARE
Problem: Adult Inpatient Plan of Care  Goal: Plan of Care Review  Outcome: Ongoing, Progressing  Flowsheets (Taken 10/6/2022 1700)  Progress: improving  Plan of Care Reviewed With:   patient   daughter  Outcome Evaluation: VSS, O2 titrated down to 1L NC. Nonproductive, congested, wet cough preset. SR on monitor. SLP recomm NPO. Palliative RN consulted. COde status updated. Daughter updated.  Goal: Patient-Specific Goal (Individualized)  Outcome: Ongoing, Progressing  Goal: Absence of Hospital-Acquired Illness or Injury  Outcome: Ongoing, Progressing  Intervention: Identify and Manage Fall Risk  Recent Flowsheet Documentation  Taken 10/6/2022 1612 by Sheree Hills, RN  Safety Promotion/Fall Prevention:   room organization consistent   safety round/check completed   activity supervised   assistive device/personal items within reach   clutter free environment maintained   fall prevention program maintained   muscle strengthening facilitated  Taken 10/6/2022 1400 by Sheree Hills RN  Safety Promotion/Fall Prevention:   safety round/check completed   room organization consistent   activity supervised   assistive device/personal items within reach   clutter free environment maintained  Taken 10/6/2022 1220 by Sheree Hills RN  Safety Promotion/Fall Prevention:   safety round/check completed   room organization consistent   activity supervised   assistive device/personal items within reach   clutter free environment maintained   fall prevention program maintained  Taken 10/6/2022 1000 by Sheree Hills RN  Safety Promotion/Fall Prevention:   safety round/check completed   room organization consistent   clutter free environment maintained   assistive device/personal items within reach   activity supervised   nonskid shoes/slippers when out of bed   fall prevention program maintained  Taken 10/6/2022 0819 by Sheree Hills, RN  Safety Promotion/Fall Prevention:   safety round/check  completed   room organization consistent   activity supervised   assistive device/personal items within reach   clutter free environment maintained   fall prevention program maintained  Intervention: Prevent Skin Injury  Recent Flowsheet Documentation  Taken 10/6/2022 1612 by Sheree Hills RN  Body Position:   supine   turned  Taken 10/6/2022 1400 by Sheree Hills RN  Body Position:   right   turned  Skin Protection:   adhesive use limited   transparent dressing maintained   tubing/devices free from skin contact  Taken 10/6/2022 1220 by Sheree Hills RN  Body Position:   tilted   left  Taken 10/6/2022 1000 by Sheree Hills RN  Body Position:   right   tilted  Taken 10/6/2022 0819 by Sheree Hills RN  Body Position:   supine   sitting up in bed  Skin Protection:   adhesive use limited   tubing/devices free from skin contact   transparent dressing maintained  Intervention: Prevent and Manage VTE (Venous Thromboembolism) Risk  Recent Flowsheet Documentation  Taken 10/6/2022 1612 by Sheree Hills RN  Activity Management:   activity adjusted per tolerance   activity encouraged  Taken 10/6/2022 1400 by Sheree Hills RN  Activity Management: activity adjusted per tolerance  Taken 10/6/2022 1220 by Sheree Hills RN  Activity Management:   activity encouraged   activity adjusted per tolerance  Taken 10/6/2022 1000 by Sheree Hills RN  Activity Management: activity adjusted per tolerance  Taken 10/6/2022 0819 by Sheree Hills RN  Activity Management:   activity encouraged   activity adjusted per tolerance  Goal: Optimal Comfort and Wellbeing  Outcome: Ongoing, Progressing  Goal: Readiness for Transition of Care  Outcome: Ongoing, Progressing     Problem: Fall Injury Risk  Goal: Absence of Fall and Fall-Related Injury  Outcome: Ongoing, Progressing  Intervention: Identify and Manage Contributors  Recent Flowsheet Documentation  Taken  10/6/2022 1612 by Sheree Hills RN  Medication Review/Management: medications reviewed  Taken 10/6/2022 1400 by Sheree Hills RN  Medication Review/Management: medications reviewed  Taken 10/6/2022 1220 by Sheree Hills RN  Medication Review/Management: medications reviewed  Taken 10/6/2022 1000 by Sheree Hills RN  Medication Review/Management: medications reviewed  Taken 10/6/2022 0819 by Sheree Hills RN  Medication Review/Management: medications reviewed  Intervention: Promote Injury-Free Environment  Recent Flowsheet Documentation  Taken 10/6/2022 1612 by Sheree Hills RN  Safety Promotion/Fall Prevention:   room organization consistent   safety round/check completed   activity supervised   assistive device/personal items within reach   clutter free environment maintained   fall prevention program maintained   muscle strengthening facilitated  Taken 10/6/2022 1400 by Sheree Hills RN  Safety Promotion/Fall Prevention:   safety round/check completed   room organization consistent   activity supervised   assistive device/personal items within reach   clutter free environment maintained  Taken 10/6/2022 1220 by Sheree Hills RN  Safety Promotion/Fall Prevention:   safety round/check completed   room organization consistent   activity supervised   assistive device/personal items within reach   clutter free environment maintained   fall prevention program maintained  Taken 10/6/2022 1000 by Sheree Hills RN  Safety Promotion/Fall Prevention:   safety round/check completed   room organization consistent   clutter free environment maintained   assistive device/personal items within reach   activity supervised   nonskid shoes/slippers when out of bed   fall prevention program maintained  Taken 10/6/2022 0819 by Sheree Hills RN  Safety Promotion/Fall Prevention:   safety round/check completed   room organization consistent    activity supervised   assistive device/personal items within reach   clutter free environment maintained   fall prevention program maintained     Problem: Skin Injury Risk Increased  Goal: Skin Health and Integrity  Outcome: Ongoing, Progressing  Intervention: Optimize Skin Protection  Recent Flowsheet Documentation  Taken 10/6/2022 1612 by Sheree Hills RN  Head of Bed (HOB) Positioning: HOB elevated  Taken 10/6/2022 1400 by Sheree Hills RN  Pressure Reduction Techniques:   sit time limited to 2 hours   heels elevated off bed   pressure points protected  Head of Bed (HOB) Positioning: HOB elevated  Pressure Reduction Devices:   alternating pressure pump (ADD)   positioning supports utilized  Skin Protection:   adhesive use limited   transparent dressing maintained   tubing/devices free from skin contact  Taken 10/6/2022 1220 by Sheree Hills RN  Head of Bed (HOB) Positioning: HOB elevated  Taken 10/6/2022 1000 by Sheree Hills RN  Head of Bed (HOB) Positioning: HOB elevated  Taken 10/6/2022 0819 by Sheree Hills RN  Pressure Reduction Techniques:   sit time limited to 2 hours   heels elevated off bed   positioned off wounds   pressure points protected  Head of Bed (HOB) Positioning: HOB elevated  Pressure Reduction Devices:   alternating pressure pump (ADD)   positioning supports utilized  Skin Protection:   adhesive use limited   tubing/devices free from skin contact   transparent dressing maintained     Problem: Infection (Intestinal Obstruction)  Goal: Absence of Infection Signs and Symptoms  Outcome: Ongoing, Progressing     Problem: Pain (Intestinal Obstruction)  Goal: Acceptable Pain Control  Outcome: Ongoing, Progressing   Goal Outcome Evaluation:  Plan of Care Reviewed With: patient, daughter        Progress: improving  Outcome Evaluation: VSS, O2 titrated down to 1L NC. Nonproductive, congested, wet cough preset. SR on monitor. SLP recomm NPO. Palliative RN  consulted. COde status updated. Daughter updated.

## 2022-10-07 LAB
DEPRECATED RDW RBC AUTO: 42.6 FL (ref 37–54)
ERYTHROCYTE [DISTWIDTH] IN BLOOD BY AUTOMATED COUNT: 12.5 % (ref 12.3–15.4)
HCT VFR BLD AUTO: 29.2 % (ref 37.5–51)
HGB BLD-MCNC: 9.8 G/DL (ref 13–17.7)
MCH RBC QN AUTO: 31.4 PG (ref 26.6–33)
MCHC RBC AUTO-ENTMCNC: 33.6 G/DL (ref 31.5–35.7)
MCV RBC AUTO: 93.6 FL (ref 79–97)
PLATELET # BLD AUTO: 181 10*3/MM3 (ref 140–450)
PMV BLD AUTO: 10 FL (ref 6–12)
RBC # BLD AUTO: 3.12 10*6/MM3 (ref 4.14–5.8)
WBC NRBC COR # BLD: 9.75 10*3/MM3 (ref 3.4–10.8)

## 2022-10-07 PROCEDURE — 85027 COMPLETE CBC AUTOMATED: CPT | Performed by: HOSPITALIST

## 2022-10-07 PROCEDURE — 94761 N-INVAS EAR/PLS OXIMETRY MLT: CPT

## 2022-10-07 PROCEDURE — 94799 UNLISTED PULMONARY SVC/PX: CPT

## 2022-10-07 PROCEDURE — 25010000002 PIPERACILLIN SOD-TAZOBACTAM PER 1 G: Performed by: STUDENT IN AN ORGANIZED HEALTH CARE EDUCATION/TRAINING PROGRAM

## 2022-10-07 PROCEDURE — 99232 SBSQ HOSP IP/OBS MODERATE 35: CPT

## 2022-10-07 PROCEDURE — 94760 N-INVAS EAR/PLS OXIMETRY 1: CPT

## 2022-10-07 RX ADMIN — DULOXETINE HYDROCHLORIDE 60 MG: 60 CAPSULE, DELAYED RELEASE ORAL at 09:57

## 2022-10-07 RX ADMIN — CETIRIZINE HYDROCHLORIDE 5 MG: 10 TABLET ORAL at 09:57

## 2022-10-07 RX ADMIN — MULTIPLE VITAMINS W/ MINERALS TAB 1 TABLET: TAB at 09:57

## 2022-10-07 RX ADMIN — MONTELUKAST SODIUM 10 MG: 10 TABLET, FILM COATED ORAL at 21:52

## 2022-10-07 RX ADMIN — TAZOBACTAM SODIUM AND PIPERACILLIN SODIUM 3.38 G: 375; 3 INJECTION, SOLUTION INTRAVENOUS at 04:46

## 2022-10-07 RX ADMIN — TAMSULOSIN HYDROCHLORIDE 0.4 MG: 0.4 CAPSULE ORAL at 21:52

## 2022-10-07 RX ADMIN — IPRATROPIUM BROMIDE AND ALBUTEROL SULFATE 3 ML: 2.5; .5 SOLUTION RESPIRATORY (INHALATION) at 19:02

## 2022-10-07 RX ADMIN — ACETAMINOPHEN 650 MG: 325 TABLET, FILM COATED ORAL at 03:35

## 2022-10-07 RX ADMIN — PANTOPRAZOLE SODIUM 40 MG: 40 TABLET, DELAYED RELEASE ORAL at 06:49

## 2022-10-07 RX ADMIN — IPRATROPIUM BROMIDE AND ALBUTEROL SULFATE 3 ML: 2.5; .5 SOLUTION RESPIRATORY (INHALATION) at 07:20

## 2022-10-07 RX ADMIN — IPRATROPIUM BROMIDE AND ALBUTEROL SULFATE 3 ML: 2.5; .5 SOLUTION RESPIRATORY (INHALATION) at 14:59

## 2022-10-07 NOTE — NURSING NOTE
Patient A & O to self. Continues with difficulty swallowing with eating. Turned and repositioned by staff. Dressings clean, dry, and intact to elbow and coccyx. [facility] consult called to Negin. No c/o pain. No s/s of distress noted.

## 2022-10-07 NOTE — PROGRESS NOTES
Gastroenterology   Inpatient Progress Note    Reason for Follow Up:  Liver lesion, acute diverticulitis    Subjective  Interval History:     Patient denies abdominal pain, denies nausea or vomiting.  Family and patient have declined liver biopsy at this time and acute diverticulitis has resolved.    Hemoglobin relatively stable at 8.2.      Current Facility-Administered Medications:   •  acetaminophen (TYLENOL) tablet 650 mg, 650 mg, Oral, Q4H PRN, 650 mg at 10/07/22 0335 **OR** acetaminophen (TYLENOL) 160 MG/5ML solution 650 mg, 650 mg, Oral, Q4H PRN **OR** acetaminophen (TYLENOL) suppository 650 mg, 650 mg, Rectal, Q4H PRN, Melanie Pickard APRN  •  calcium carbonate (TUMS) chewable tablet 500 mg (200 mg elemental), 2 tablet, Oral, BID PRN, Melanie Pickard APRN  •  cetirizine (zyrTEC) tablet 5 mg, 5 mg, Oral, Daily, Adrianne Gomez MD, 5 mg at 10/06/22 0819  •  DULoxetine (CYMBALTA) DR capsule 60 mg, 60 mg, Oral, Daily, Adrianne Gomez MD, 60 mg at 10/06/22 0819  •  HYDROmorphone (DILAUDID) injection 0.5 mg, 0.5 mg, Intravenous, Q2H PRN, Adrianne Gomez MD  •  ipratropium-albuterol (DUO-NEB) nebulizer solution 3 mL, 3 mL, Nebulization, Q4H PRN, Melanie Pickard APRN  •  ipratropium-albuterol (DUO-NEB) nebulizer solution 3 mL, 3 mL, Nebulization, 4x Daily - RT, Lauro Laguna MD, 3 mL at 10/07/22 0720  •  montelukast (SINGULAIR) tablet 10 mg, 10 mg, Oral, Nightly, Adrianne Gomez MD, 10 mg at 10/06/22 2033  •  multivitamin with minerals 1 tablet, 1 tablet, Oral, Daily, Adrianne Gomez MD, 1 tablet at 10/06/22 0819  •  nitroglycerin (NITROSTAT) SL tablet 0.4 mg, 0.4 mg, Sublingual, Q5 Min PRN, Melanie Pickard APRN  •  ondansetron (ZOFRAN) tablet 4 mg, 4 mg, Oral, Q6H PRN **OR** ondansetron (ZOFRAN) injection 4 mg, 4 mg, Intravenous, Q6H PRN, Melanie Pickard APRN  •  oxyCODONE-acetaminophen (PERCOCET) 5-325 MG per tablet 1 tablet, 1 tablet, Oral, Q6H PRN,  Adrianne Gomez MD, 1 tablet at 10/06/22 0231  •  pantoprazole (PROTONIX) EC tablet 40 mg, 40 mg, Oral, Q AM, Adrianne Gomez MD, 40 mg at 10/07/22 0649  •  piperacillin-tazobactam (ZOSYN) 3.375 g in iso-osmotic dextrose 50 ml (premix), 3.375 g, Intravenous, Q8H, Adrianne Gomez MD, Stopped at 10/07/22 0649  •  [COMPLETED] Insert peripheral IV, , , Once **AND** sodium chloride 0.9 % flush 10 mL, 10 mL, Intravenous, PRN, Jacob Yang III, PA  •  sodium chloride 0.9 % flush 10 mL, 10 mL, Intravenous, Q12H, Melanie Pickard APRN, 10 mL at 10/06/22 2034  •  sodium chloride 0.9 % flush 10 mL, 10 mL, Intravenous, PRN, Melanie Pickard APRN, 10 mL at 10/02/22 1054  •  tamsulosin (FLOMAX) 24 hr capsule 0.4 mg, 0.4 mg, Oral, Nightly, Adrianne Gomez MD, 0.4 mg at 10/06/22 2034  Review of Systems:               All systems were reviewed and negative except for:  Constitution:  positive for anorexia and Dysphagia  Gastrointestinal: positive for  change in bowel habits    Objective     Vital Signs  Temp:  [97.7 °F (36.5 °C)-98.3 °F (36.8 °C)] 98.1 °F (36.7 °C)  Heart Rate:  [72-94] 72  Resp:  [16-22] 20  BP: (108-152)/(77-94) 152/90  Body mass index is 24.28 kg/m².                  General Appearance:  disoriented, thin and appears frail  Abdomen:  Soft, non-tender, normal bowel sounds; no bruits, organomegaly or masses.                Results Review:                I reviewed the patient's new clinical results.    Results from last 7 days   Lab Units 10/06/22  0610 10/05/22  2353 10/05/22  1838 10/05/22  1149 10/05/22  0559 10/04/22  0739 10/04/22  0641   WBC 10*3/mm3 10.41  --   --   --  10.71  --  12.74*   HEMOGLOBIN g/dL 8.2* 8.3* 9.1*   < > 7.9*   < > 8.8*   HEMATOCRIT % 23.9* 24.1* 27.2*   < > 23.7*   < > 25.9*   PLATELETS 10*3/mm3 135*  --   --   --  109*  --  98*    < > = values in this interval not displayed.     Results from last 7 days   Lab Units 10/06/22  0601 10/05/22  2175  10/05/22  0559 10/04/22  0641   SODIUM mmol/L 142  --  141 140   POTASSIUM mmol/L 3.7 3.9 3.3* 3.5   CHLORIDE mmol/L 112*  --  109* 107   CO2 mmol/L 23.6  --  26.0 24.4   BUN mg/dL 10  --  10 8   CREATININE mg/dL 0.85  --  0.77 0.48*   CALCIUM mg/dL 7.8*  --  7.6* 7.9*   BILIRUBIN mg/dL 1.0  --  0.9 1.2   ALK PHOS U/L 69  --  62 70   ALT (SGPT) U/L 74*  --  108* 186*   AST (SGOT) U/L 24  --  36 79*   GLUCOSE mg/dL 98  --  101* 103*     Results from last 7 days   Lab Units 10/03/22  1249   INR  1.31*     Lab Results   Lab Value Date/Time    LIPASE 13 10/01/2022 1939    LIPASE 11 (L) 06/29/2018 1214       Radiology:  CT Abdomen With & Without Contrast   Final Result   There is increasing hemoperitoneum and hematoma identified today. The CT   findings are most concerning for hepatic lesion that is hemorrhage and   subsequently ruptured. The underlying lesion may represent a   hepatocellular carcinoma. There are additional lesions seen within the   liver as described above. These findings were discussed by telephone   with Dr. Vizcarra at the time of dictation.       Radiation dose reduction techniques were utilized, including automated   exposure control and exposure modulation based on body size.       This report was finalized on 10/5/2022 12:44 PM by Dr. Baldev Kraft M.D.          XR Chest 1 View   Final Result   Nonspecific bibasilar consolidation.       This report was finalized on 10/1/2022 11:00 PM by Dr. Soheila Mann M.D.          CT Abdomen Pelvis With Contrast   Final Result       1. Bibasilar consolidation may reflect atelectasis, although correlation   with any evidence of pneumonia is recommended.   2. Left upper lobe pulmonary nodule, as well as enlarged left hilar   lymph node. Malignancy cannot be excluded.   3. Apparent heterogeneous lesions within the right lobe of the liver.   Neoplasm versus abscess would be considerations. Full assessment of the   liver lesions is severely limited due  to streak artifact and motion   artifact. Liver protocol CT or MRI could be considered for further   assessment.   4. Diverticulosis, as well as a thick walled segment of sigmoid colon.   This may reflect diverticulitis, although follow-up colonoscopy is   suggested to exclude any underlying neoplasm.   5. Patient does have some free fluid tracking along the paracolic   gutters bilaterally. There may be some additional free fluid around the   liver and spleen.       Radiation dose reduction techniques were utilized, including automated   exposure control and exposure modulation based on body size.       This report was finalized on 10/1/2022 10:42 PM by Dr. Soheila Mann M.D.          CT Angiogram Chest   Final Result       1. Bibasilar consolidation may reflect atelectasis, although correlation   with any evidence of pneumonia is recommended.   2. Left upper lobe pulmonary nodule, as well as enlarged left hilar   lymph node. Malignancy cannot be excluded.   3. Apparent heterogeneous lesions within the right lobe of the liver.   Neoplasm versus abscess would be considerations. Full assessment of the   liver lesions is severely limited due to streak artifact and motion   artifact. Liver protocol CT or MRI could be considered for further   assessment.   4. Diverticulosis, as well as a thick walled segment of sigmoid colon.   This may reflect diverticulitis, although follow-up colonoscopy is   suggested to exclude any underlying neoplasm.   5. Patient does have some free fluid tracking along the paracolic   gutters bilaterally. There may be some additional free fluid around the   liver and spleen.       Radiation dose reduction techniques were utilized, including automated   exposure control and exposure modulation based on body size.       This report was finalized on 10/1/2022 10:42 PM by Dr. Soheila Mann M.D.              Assessment & Plan     Patient Active Problem List   Diagnosis   • Diverticulitis    • COPD (chronic obstructive pulmonary disease) (HCC)   • Benign essential tremor   • Status post deep brain stimulator placement   • Sigmoid diverticulitis   • Liver lesion   • History of CVA (cerebrovascular accident)   • History of aspiration pneumonia   • Essential hypertension       Assessment:  1. Liver lesion-CT suggest possible bleeding from lesion, not an abscess per radiology  2. Acute diverticulitis-improved  3. Anemia-acute posthemorrhagic  4. Hemoperitoneum  5. Elevated LFTs-improved  6. AFP-2.35      Plan:  · We will sign off at this time secondary to resolved acute diverticulitis episode and patient/family's wishes not to pursue liver biopsy.    GI will sign off for now.  As always, thank you for allowing us to participate in the care of your patient.  If we can be of further assistance please not hesitate to reach out to us.      I discussed the patients findings and my recommendations with patient, family and nursing staff.          OSMANI Alcala  [facility]  [address] [place] [address]

## 2022-10-07 NOTE — PROGRESS NOTES
"    DAILY PROGRESS NOTE  [facility]    Patient Identification:  Name: Boni Vega  Age: 81 y.o.  Sex: male  :  1940  MRN: [id number]         Primary Care Physician: Radha Mata    Subjective:  Interval History: History and review of system not the most reliable today.  He is conversational but not as oriented as what I saw yesterday.  He wants to get up out of bed and I indicated that he is at an increased risk to fall and to please hit his call button.  Aide at bedside and case also discussed with RN at bedside to help reposition patient in bed prior to a.m. breakfast tray.  He is not complain of any abdominal pain and there is been no issues with emesis    Objective: Elderly and quite frail though nontoxic.  No family currently at bedside.    Scheduled Meds:cetirizine, 5 mg, Oral, Daily  DULoxetine, 60 mg, Oral, Daily  ipratropium-albuterol, 3 mL, Nebulization, 4x Daily - RT  montelukast, 10 mg, Oral, Nightly  multivitamin with minerals, 1 tablet, Oral, Daily  pantoprazole, 40 mg, Oral, Q AM  piperacillin-tazobactam, 3.375 g, Intravenous, Q8H  sodium chloride, 10 mL, Intravenous, Q12H  tamsulosin, 0.4 mg, Oral, Nightly      Continuous Infusions:     Vital signs in last 24 hours:  Temp:  [97.7 °F (36.5 °C)-98.3 °F (36.8 °C)] 98.1 °F (36.7 °C)  Heart Rate:  [72-94] 72  Resp:  [16-22] 20  BP: (108-152)/(77-94) 152/90    Intake/Output:    Intake/Output Summary (Last 24 hours) at 10/7/2022 0915  Last data filed at 10/6/2022 2018  Gross per 24 hour   Intake 60 ml   Output --   Net 60 ml       Exam:  /90 (BP Location: Left arm, Patient Position: Lying)   Pulse 72   Temp 98.1 °F (36.7 °C) (Oral)   Resp 20   Ht 182.9 cm (72\")   Wt 81.2 kg (179 lb 0.2 oz)   SpO2 100%   BMI 24.28 kg/m²     General Appearance:    Alert, cooperative and follows commands, not exactly thriving, malnourished                          Head:    Normocephalic, without obvious abnormality, atraumatic          "                  Eyes:    PERRLA/EOM's intact, both eyes                         Throat:   Oral mucosa pink and moist                           Neck:   Supple, symmetrical, trachea midline, no JVD                         Lungs:    Decreased bases otherwise clear to auscultation bilaterally, respirations unlabored                 Chest Wall:    No tenderness or deformity                          Heart:    Regular rate and rhythm, S1 and S2 normal                  Abdomen:     Soft, nontender, bowel sounds active, no rebound no guarding especially over left lower quadrant as well as epigastrium/liver                 Extremities:   Generalized weakness though moving all, no cyanosis or edema                        Pulses:   Pulses palpable in lower extremities                  Neurologic:   CNII-XII intact     Data Review:  Labs in chart were reviewed.    Assessment:  Active Hospital Problems    Diagnosis  POA   • **Sigmoid diverticulitis [K57.32]  Unknown   • COPD (chronic obstructive pulmonary disease) (HCC) [J44.9]  Unknown   • Benign essential tremor [G25.0]  Unknown   • 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       Plan:    Acute diverticulitis - resolving -DC vancomycin.  resolved per surgery - dc Zosyn      Anemia chronic disease/TCP -H&H remaining overall stable -DC every 6 checks              -TCP nearly resolved   -CBC pending      Multiple liver lesions              -final decision - NO plans for Bx     COPD/hypoxemia with left upper lobe nodule/hilar lymphadenopathy              -Pulmonology consulted and following -lesions not really amenable to biopsy due to location   -If  "remains on treatment course, pulmonology suggest imaging surveillance     HTN stable     Essential tremor with deep brain stimulator     Past history of CVA     Dysphagia recommended n.p.o. per speech -extensive conversation with daughter concerning diet and DNR CODE STATUS and patient was eating a regular diet prior to admission and she notes that it provides him \"quality of life\" and she would like for a regular diet to be utilized so speech consult has been discontinued and regular diet has been advanced.  She understands the increased risk of aspiration and accepts that          Disposition -CCP to help coordinate -ultimately dispo plans are not full palliative but I do think this patient is going to need some type of a modified palliative plan post discharge.   -Palliative consult RN is pending and I think this would help to clarify things with the daughter   -Case discussed with Shayy over the phone at [phone]   ->30m spent counseling and coordinating care         Nazario Sheldon MD  10/7/2022  09:15 EDT    "

## 2022-10-07 NOTE — CASE MANAGEMENT/SOCIAL WORK
Continued Stay Note  [facility]     Patient Name: Boni Vega  MRN: [id number]  Today's Date: 10/7/2022    Admit Date: 10/1/2022    Plan: SNF   Discharge Plan     Row Name 10/07/22 1503       Plan    Plan SNF    Patient/Family in Agreement with Plan yes    Plan Comments Spoke to Antonietta/ [facility], she is following pt, spoke to Marissa/Trace she can accept pt and will anticipate starting precert Monday for [facility]. This was first choice per pt dtr., updated her on plan by phone, she is agreeable to plan. Pt will need EMS/Stretcher van transport. CCP will follow -Connie CHILDRESS               Discharge Codes    No documentation.               Expected Discharge Date and Time     Expected Discharge Date Expected Discharge Time    Oct 8, 2022             Connie Dickey RN

## 2022-10-07 NOTE — CONSULTS
"Purpose of the visit was to evaluate for: goals of care/advanced care planning and hospice referral/discussion. Spoke with MD, RN and CCP as well as family and discussed palliative care, goals of care, care options, [facility] and discharge options.      Assessment:  Patient is palliative care appropriate for community based services with [facility] or SNF with palliative care (list reasons): History of CVA, dysphagia, and COPD. Patient unable to care for self at home, incontinent of bowel and bladder. Family providing care at home. Upon assessment, patient getting cleaned up by staff. Per primary RN, pt intermittently agitated and confused. PPS 40%    Recommendations/Plan: Plan for discharge to rehab if able, consult [facility] for explanation of services. Continue modified diet for quality of life.    Other Comments: Spoke with patient's daughter and son in law via phone regarding goals of care. Both voiced good understanding of patient condition, stated they have been dealing with his aspiration for \"10 years since his stroke\" by modifying his diet at home. Both agreed patient goals have always been, \"keep me fed, keep me clean, and hand me the remote\". Daughter also stated patient never wanted to be in a nursing home, but was ok with short term rehab. Discussed hospice services at home. 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 Psychosocial support provided. Both agree patient would be best served by going to rehab at discharge and then coming home. They would like to hear from [facility] for an explanation of services. Discussed with MD.   "

## 2022-10-07 NOTE — PROGRESS NOTES
pulm stable, will follow at a distance  If patient does not go palliative, some follow up on his lad and lung nodule would be recommended.

## 2022-10-07 NOTE — PLAN OF CARE
Goal Outcome Evaluation:  Plan of Care Reviewed With: patient, daughter        Progress: no change  Outcome Evaluation:     Confused. VSS. SR on telemetry. 2L via n/c. Productive cough.     Q2 turning.   Accumax pump in use.     C/O headache -   prn tylenol given.     Will continue to monitor.       Pt diet changed on 10/6/22 on prior shift from NPO to Puree with HTL.      Per report, despite SLP recommendation that pt remain NPO, pt family did not want this. MD was made aware and was agreeable to pt being on a diet.      Spoke with family at bedside this shift and they are pleased that patient now has a diet; however, they deny being disagreeable to pt being NPO. They would like MD to know that they are agreeable to Tube feedings if pt continues to aspirate.      Palliative is expected to see pt possibly today.

## 2022-10-07 NOTE — PLAN OF CARE
Problem: Adult Inpatient Plan of Care  Goal: Plan of Care Review  10/7/2022 1539 by Luz Elena Richard RN  Outcome: Ongoing, Progressing  Flowsheets (Taken 10/7/2022 1539)  Progress: no change  Plan of Care Reviewed With: patient  Outcome Evaluation: Patient A & O to self. Continues with difficulty swallowing with eating. Turned and repositioned by staff. Dressings clean, dry, and intact to elbow and coccyx. [facility] consult called to Negin. No c/o pain. No s/s of distress noted  10/7/2022 1538 by Luz Elena Richard RN  Outcome: Ongoing, Progressing  Flowsheets (Taken 10/7/2022 1538)  Progress: no change  Goal: Patient-Specific Goal (Individualized)  10/7/2022 1539 by Luz Elena Richard RN  Outcome: Ongoing, Progressing  10/7/2022 1538 by Luz Elena Richard RN  Outcome: Ongoing, Progressing  Goal: Absence of Hospital-Acquired Illness or Injury  10/7/2022 1539 by Luz Elena Richard RN  Outcome: Ongoing, Progressing  10/7/2022 1538 by Luz Elena Richard RN  Outcome: Ongoing, Progressing  Intervention: Identify and Manage Fall Risk  Recent Flowsheet Documentation  Taken 10/7/2022 1410 by Luz Elena Richard RN  Safety Promotion/Fall Prevention:   activity supervised   safety round/check completed  Taken 10/7/2022 1200 by Luz Elena Richard RN  Safety Promotion/Fall Prevention:   activity supervised   safety round/check completed  Taken 10/7/2022 1000 by Luz Elena Richard RN  Safety Promotion/Fall Prevention:   activity supervised   safety round/check completed  Taken 10/7/2022 0800 by Luz Elena Richard RN  Safety Promotion/Fall Prevention:   activity supervised   safety round/check completed  Intervention: Prevent Skin Injury  Recent Flowsheet Documentation  Taken 10/7/2022 1410 by Luz Elena Richard RN  Body Position: supine  Skin Protection: adhesive use limited  Taken 10/7/2022 1200 by Luz Elena Richard RN  Body Position:   turned   left  Taken 10/7/2022 1000 by Luz Elena Richard RN  Body Position:   turned   right  Skin Protection: adhesive use  limited  Taken 10/7/2022 0800 by Luz Elena Richard RN  Body Position: supine  Intervention: Prevent and Manage VTE (Venous Thromboembolism) Risk  Recent Flowsheet Documentation  Taken 10/7/2022 1410 by Luz Elena Richard RN  Activity Management: activity adjusted per tolerance  Taken 10/7/2022 1200 by Luz Elena Richard RN  Activity Management: activity adjusted per tolerance  Taken 10/7/2022 1000 by Luz Elena Richard RN  Activity Management: activity adjusted per tolerance  Taken 10/7/2022 0800 by Luz Elena Richard RN  Activity Management: activity adjusted per tolerance  Intervention: Prevent Infection  Recent Flowsheet Documentation  Taken 10/7/2022 1410 by Luz Elena Richard RN  Infection Prevention: rest/sleep promoted  Taken 10/7/2022 1200 by Luz Elena Richard RN  Infection Prevention: rest/sleep promoted  Taken 10/7/2022 1000 by Luz Elena Richard RN  Infection Prevention: rest/sleep promoted  Taken 10/7/2022 0800 by Luz Elena Richard RN  Infection Prevention: rest/sleep promoted  Goal: Optimal Comfort and Wellbeing  10/7/2022 1539 by Luz Elena Richard RN  Outcome: Ongoing, Progressing  10/7/2022 1538 by Luz Elena Richard RN  Outcome: Ongoing, Progressing  Intervention: Provide Person-Centered Care  Recent Flowsheet Documentation  Taken 10/7/2022 1410 by Luz Elena Richard RN  Trust Relationship/Rapport: care explained  Taken 10/7/2022 1000 by Luz Elena Richard RN  Trust Relationship/Rapport:   care explained   questions answered  Goal: Readiness for Transition of Care  10/7/2022 1539 by Luz Elena Richard RN  Outcome: Ongoing, Progressing  10/7/2022 1538 by Luz Elena Richard RN  Outcome: Ongoing, Progressing   Goal Outcome Evaluation:  Plan of Care Reviewed With: patient        Progress: no change  Outcome Evaluation: Patient A & O to self. Continues with difficulty swallowing with eating. Turned and repositioned by staff. Dressings clean, dry, and intact to elbow and coccyx. [facility] consult called to Negin. No c/o pain. No s/s of distress  noted

## 2022-10-07 NOTE — PROGRESS NOTES
The patient's chart was reviewed.  The diverticulitis has resolved and I agree with stopping the antibiotics.  There are no plans for biopsy of the liver mass based on Dr. Sheldon's discussions with the patient's daughter.  I agree with this approach.  There are no active surgical issues.  I will sign off but remain available if needed.

## 2022-10-08 PROCEDURE — 94799 UNLISTED PULMONARY SVC/PX: CPT

## 2022-10-08 PROCEDURE — 94664 DEMO&/EVAL PT USE INHALER: CPT

## 2022-10-08 PROCEDURE — 94761 N-INVAS EAR/PLS OXIMETRY MLT: CPT

## 2022-10-08 PROCEDURE — 97530 THERAPEUTIC ACTIVITIES: CPT

## 2022-10-08 PROCEDURE — 97110 THERAPEUTIC EXERCISES: CPT

## 2022-10-08 RX ADMIN — PANTOPRAZOLE SODIUM 40 MG: 40 TABLET, DELAYED RELEASE ORAL at 06:55

## 2022-10-08 RX ADMIN — IPRATROPIUM BROMIDE AND ALBUTEROL SULFATE 3 ML: 2.5; .5 SOLUTION RESPIRATORY (INHALATION) at 14:50

## 2022-10-08 RX ADMIN — IPRATROPIUM BROMIDE AND ALBUTEROL SULFATE 3 ML: 2.5; .5 SOLUTION RESPIRATORY (INHALATION) at 11:02

## 2022-10-08 RX ADMIN — TAMSULOSIN HYDROCHLORIDE 0.4 MG: 0.4 CAPSULE ORAL at 20:15

## 2022-10-08 RX ADMIN — Medication 10 ML: at 20:15

## 2022-10-08 RX ADMIN — IPRATROPIUM BROMIDE AND ALBUTEROL SULFATE 3 ML: 2.5; .5 SOLUTION RESPIRATORY (INHALATION) at 07:11

## 2022-10-08 RX ADMIN — ACETAMINOPHEN 650 MG: 325 TABLET, FILM COATED ORAL at 08:59

## 2022-10-08 RX ADMIN — MONTELUKAST SODIUM 10 MG: 10 TABLET, FILM COATED ORAL at 20:15

## 2022-10-08 RX ADMIN — DULOXETINE HYDROCHLORIDE 60 MG: 60 CAPSULE, DELAYED RELEASE ORAL at 08:59

## 2022-10-08 RX ADMIN — CETIRIZINE HYDROCHLORIDE 5 MG: 10 TABLET ORAL at 08:59

## 2022-10-08 RX ADMIN — IPRATROPIUM BROMIDE AND ALBUTEROL SULFATE 3 ML: 2.5; .5 SOLUTION RESPIRATORY (INHALATION) at 18:55

## 2022-10-08 NOTE — PROGRESS NOTES
"    DAILY PROGRESS NOTE  [facility]    Patient Identification:  Name: Boni Vega  Age: 81 y.o.  Sex: male  :  1940  MRN: [id number]         Primary Care Physician: Radha Mata    Subjective:  Interval History: Case discussed at length with RN at bedside regarding diet.  Patient had some headache today and received some Tylenol we will see how that responds.  He is otherwise not really voicing anything new and denies any nausea.  He obviously has trouble swallowing and sometimes things that show up on the food plate such as ford or not really a great idea.  Counseled RN in regards to diet and p.o. intake.  And have discussed this at length with POA.  Patient denies any chest pain    Objective: Elderly and frail though pleasant and conversational.  Not thriving    Scheduled Meds:cetirizine, 5 mg, Oral, Daily  DULoxetine, 60 mg, Oral, Daily  ipratropium-albuterol, 3 mL, Nebulization, 4x Daily - RT  montelukast, 10 mg, Oral, Nightly  multivitamin with minerals, 1 tablet, Oral, Daily  pantoprazole, 40 mg, Oral, Q AM  sodium chloride, 10 mL, Intravenous, Q12H  tamsulosin, 0.4 mg, Oral, Nightly      Continuous Infusions:     Vital signs in last 24 hours:  Temp:  [98.3 °F (36.8 °C)-98.5 °F (36.9 °C)] 98.5 °F (36.9 °C)  Heart Rate:  [51-83] 70  Resp:  [16-20] 20  BP: (119-164)/() 159/85    Intake/Output:    Intake/Output Summary (Last 24 hours) at 10/8/2022 1011  Last data filed at 10/8/2022 0300  Gross per 24 hour   Intake 120 ml   Output --   Net 120 ml       Exam:  /85 (BP Location: Left arm, Patient Position: Lying)   Pulse 70   Temp 98.5 °F (36.9 °C) (Oral)   Resp 20   Ht 182.9 cm (72\")   Wt 81.2 kg (179 lb 0.2 oz)   SpO2 92%   BMI 24.28 kg/m²     General Appearance:    Alert, cooperative/follows commands, nontoxic                          Head:    Normocephalic, without obvious abnormality, atraumatic                           Eyes:    Conjunctivae/corneas clear, EOM's " intact, both eyes                         Throat:   Oral mucosa pink and moist                           Neck:   No JVD                         Lungs:    Clear to auscultation bilaterally -referred upper airway breath sounds noted coming from his throat due to dysphagia, respirations unlabored and no current distress with stable oxygenation                          Heart:    Regular rate and rhythm, S1 and S2 normal                  Abdomen:     Soft, nontender, bowel sounds active                 Extremities: Generalized weakness though moving all while sitting in bed, no cyanosis or edema                        Pulses:   Pulses palpable in all extremities                            Skin:   Skin is warm and dry                  Neurologic:   CNII-XII intact     Data Review:  Labs in chart were reviewed.    Assessment:  Active Hospital Problems    Diagnosis  POA   • **Sigmoid diverticulitis [K57.32]  Unknown   • COPD (chronic obstructive pulmonary disease) (HCC) [J44.9]  Unknown   • Benign essential tremor [G25.0]  Unknown   • 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       Plan:    Acute diverticulitis resolved     Liver lesions possibly cancer and no plans for biopsy    Anemia of chronic disease stable with Hgb improved from 8.2 up to 9.8.      TCP resolved    COPD/hypoxemia with left upper lobe nodule and hilar lymphadenopathy   -Possible outpatient surveillance with imaging not really necessary    HTN stable    Dysphagia -regular diet with thin liquids.  No thickened liquids per family request    CODE STATUS DNR      Disposition -patient will be here through the weekend as we are  awaiting insurance precertification and CCP to coordinate rehab per POA request.  Realistic in regards to goals at rehab where she just wants him to retain some upper body strength to help with transfers.  Case discussed with JULISSA/Shayy over the phone at [phone]    Nazario Sheldon MD  10/8/2022  10:11 EDT

## 2022-10-08 NOTE — THERAPY TREATMENT NOTE
Patient Name: Boni Vega  : 1940    MRN: [id number]                              Today's Date: 10/8/2022       Admit Date: 10/1/2022    Visit Dx:     ICD-10-CM ICD-9-CM   1. Diverticulitis  K57.92 562.11   2. Sepsis, due to unspecified organism, unspecified whether acute organ dysfunction present ([facility])  A41.9 038.9     995.91   3. Abnormal CT of the chest  R93.89 793.2   4. Abnormal CT of the abdomen  R93.5 793.6     Patient Active Problem List   Diagnosis   • Diverticulitis   • COPD (chronic obstructive pulmonary disease) ([facility])   • Benign essential tremor   • Status post deep brain stimulator placement   • Sigmoid diverticulitis   • Liver lesion   • History of CVA (cerebrovascular accident)   • History of aspiration pneumonia   • Essential hypertension     Past Medical History:   Diagnosis Date   • COPD (chronic obstructive pulmonary disease) ([facility])    • S/P deep brain stimulator placement     essential tremors--15 yrs ago   • Stroke ([facility])     left hand dexterity affected and aspiration issues after--15 yrs ago     Past Surgical History:   Procedure Laterality Date   • CHOLECYSTECTOMY     • HIP FRACTURE SURGERY Left     1 1/2 yrs ago---has 2 large screws in place   • KYPHOPLASTY      1 1/2 yrs ago      General Information     Row Name 10/08/22 1302          Physical Therapy Time and Intention    Document Type therapy note (daily note)  -     Mode of Treatment physical therapy  -     Row Name 10/08/22 1302          General Information    Existing Precautions/Restrictions fall;oxygen therapy device and L/min  -     Row Name 10/08/22 1302          Cognition    Orientation Status (Cognition) oriented x 3  -           User Key  (r) = Recorded By, (t) = Taken By, (c) = Cosigned By    Initials Name Provider Type     Connie Cedeno PTA Physical Therapist Assistant               Mobility     Row Name 10/08/22 1303          Bed Mobility    Bed Mobility supine-sit;sit-supine  -     Supine-Sit  [place] (Bed Mobility) moderate assist (50% patient effort);2 person assist  -SM     Sit-Supine [place] (Bed Mobility) maximum assist (25% patient effort);2 person assist  -SM     Assistive Device (Bed Mobility) bed rails;head of bed elevated  -     Row Name 10/08/22 1303          Sit-Stand Transfer    Sit-Stand [place] (Transfers) moderate assist (50% patient effort);2 person assist  -SM     Assistive Device (Sit-Stand Transfers) walker, front-wheeled  -     Comment, (Sit-Stand Transfer) stood 2x w/ bed elevated; cues to lean forward; very unsteady, though able to take a few small side steps towards HOB  -SM           User Key  (r) = Recorded By, (t) = Taken By, (c) = Cosigned By    Initials Name Provider Type    Connie Rider PTA Physical Therapist Assistant               Obj/Interventions     Row Name 10/08/22 1306          Motor Skills    Therapeutic Exercise --  seated AP, LAQ, marches x10 reps  -     Row Name 10/08/22 1306          Balance    Comment, Balance between SBA-max A for static sitting balance, initially more assist when pt demonstrating posterior lean  -SM           User Key  (r) = Recorded By, (t) = Taken By, (c) = Cosigned By    Initials Name Provider Type    Connie Rider PTA Physical Therapist Assistant               Goals/Plan    No documentation.                Clinical Impression     Row Name 10/08/22 1307          Pain    Pretreatment Pain Rating 0/10 - no pain  -SM     Posttreatment Pain Rating 0/10 - no pain  -SM     Row Name 10/08/22 1307          Positioning and Restraints    Pre-Treatment Position in bed  -SM     Post Treatment Position bed  -SM     In Bed supine;call light within reach;encouraged to call for assist;exit alarm on  -SM           User Key  (r) = Recorded By, (t) = Taken By, (c) = Cosigned By    Initials Name Provider Type    Connie Rider PTA Physical Therapist Assistant               Outcome Measures     Row Name  10/08/22 1308          How much help from another person do you currently need...    Turning from your back to your side while in flat bed without using bedrails? 2  -SM     Moving from lying on back to sitting on the side of a flat bed without bedrails? 2  -SM     Moving to and from a bed to a chair (including a wheelchair)? 2  -SM     Standing up from a chair using your arms (e.g., wheelchair, bedside chair)? 2  -SM     Climbing 3-5 steps with a railing? 1  -SM     To walk in hospital room? 1  -SM     AM-PAC 6 Clicks Score (PT) 10  -SM     Highest level of mobility 4 --> Transferred to chair/commode  -     Row Name 10/08/22 1308          Functional Assessment    Outcome Measure Options AM-PAC 6 Clicks Basic Mobility (PT)  -           User Key  (r) = Recorded By, (t) = Taken By, (c) = Cosigned By    Initials Name Provider Type    Connie Rider PTA Physical Therapist Assistant                             Physical Therapy Education     Title: PT OT SLP Therapies (Done)     Topic: Physical Therapy (Done)     Point: Mobility training (Done)     Learning Progress Summary           Patient Acceptance, E,TB,D, VU,NR by  at 10/8/2022 1308    Acceptance, E, NR,VU by AG at 10/6/2022 1659    Comment: pt is confused    Acceptance, E, NR by DB at 10/6/2022 1327   Family Acceptance, E, NR,VU by AG at 10/6/2022 1659    Comment: pt is confused                   Point: Home exercise program (Done)     Learning Progress Summary           Patient Acceptance, E,TB,D, VU,NR by  at 10/8/2022 1308    Acceptance, E, NR,VU by AG at 10/6/2022 1659    Comment: pt is confused    Acceptance, E, NR by DB at 10/6/2022 1327   Family Acceptance, E, NR,VU by AG at 10/6/2022 1659    Comment: pt is confused                   Point: Body mechanics (Done)     Learning Progress Summary           Patient Acceptance, E,TB,D, VU,NR by  at 10/8/2022 1308    Acceptance, E, NR,VU by AG at 10/6/2022 1659    Comment: pt is confused     Acceptance, E, NR by  at 10/6/2022 1327   Family Acceptance, E, NR,VU by  at 10/6/2022 1659    Comment: pt is confused                   Point: Precautions (Done)     Learning Progress Summary           Patient Acceptance, E,TB,D, VU,NR by  at 10/8/2022 1308    Acceptance, E, NR,VU by  at 10/6/2022 1659    Comment: pt is confused    Acceptance, E, NR by  at 10/6/2022 1327   Family Acceptance, E, NR,VU by  at 10/6/2022 1659    Comment: pt is confused                               User Key     Initials Effective Dates Name Provider Type Discipline     03/07/18 -  Connie Cedeno PTA Physical Therapist Assistant PT    DB 06/16/21 -  Jaclyn Fang PT Physical Therapist PT     06/16/21 -  Sheree Hills, RN Registered Nurse Nurse              PT Recommendation and Plan     Plan of Care Reviewed With: patient  Progress: improving  Outcome Evaluation: Pt tolerated treatment fair this date. Increased overall mobility, requiring slight less assist as well. Pt required mod-max A x2 for bed mobility, then was able to stand twice w/ mod A x2, bed elevated. Pt fatigued fairly quick, though completed a few small side steps towards HOB. Very unsteady and knees buckling, also demonstrating posterior lean in sitting and standing positions.     Time Calculation:    PT Charges     Row Name 10/08/22 1318             Time Calculation    Start Time 1020  -      Stop Time 1053  -      Time Calculation (min) 33 min  -      PT Received On 10/08/22  -      PT - Next Appointment 10/10/22  -            User Key  (r) = Recorded By, (t) = Taken By, (c) = Cosigned By    Initials Name Provider Type     Connie Cedeno PTA Physical Therapist Assistant              Therapy Charges for Today     Code Description Service Date Service Provider Modifiers Qty    02454289783 HC PT THER PROC EA 15 MIN 10/8/2022 Connie Cedeno PTA GP 1    98204821466 HC PT THERAPEUTIC ACT EA 15 MIN 10/8/2022 Wilian  Connie Fernandez PTA GP 1    08514713131 HC PT THER SUPP EA 15 MIN 10/8/2022 Connie Cedeno PTA GP 2          PT G-Codes  Outcome Measure Options: AM-PAC 6 Clicks Basic Mobility (PT)  AM-PAC 6 Clicks Score (PT): 10  AM-PAC 6 Clicks Score (OT): 11    Connie Cedeno PTA  10/8/2022

## 2022-10-08 NOTE — PLAN OF CARE
Goal Outcome Evaluation:  Plan of Care Reviewed With: patient        Progress: improving  Outcome Evaluation: Pt tolerated treatment fair this date. Increased overall mobility, requiring slight less assist as well. Pt required mod-max A x2 for bed mobility, then was able to stand twice w/ mod A x2, bed elevated. Pt fatigued fairly quick, though completed a few small side steps towards HOB. Very unsteady and knees buckling, also demonstrating posterior lean in sitting and standing positions.

## 2022-10-08 NOTE — PLAN OF CARE
Goal Outcome Evaluation:              Outcome Evaluation: Pt is alert to self, on 2L NC, he is not tolerating PO intake, he coughs between sips and sounds congested, per his daughter and MD the daughter wants him fed a regular diet with thin liquids, specifically says she does not want him to have thickened liquids or pureed food.  She says they feed him regular food at home even though he has no teeth and coughs.  Pts daughter educated about risks of aspiration.  Pt sat up straight for meals, suction set up at bedside.

## 2022-10-09 LAB
QT INTERVAL: 386 MS
QT INTERVAL: 443 MS
TROPONIN T SERPL-MCNC: <0.01 NG/ML (ref 0–0.03)

## 2022-10-09 PROCEDURE — 94761 N-INVAS EAR/PLS OXIMETRY MLT: CPT

## 2022-10-09 PROCEDURE — 93010 ELECTROCARDIOGRAM REPORT: CPT | Performed by: INTERNAL MEDICINE

## 2022-10-09 PROCEDURE — 94799 UNLISTED PULMONARY SVC/PX: CPT

## 2022-10-09 PROCEDURE — 94664 DEMO&/EVAL PT USE INHALER: CPT

## 2022-10-09 PROCEDURE — 93005 ELECTROCARDIOGRAM TRACING: CPT | Performed by: HOSPITALIST

## 2022-10-09 PROCEDURE — 93005 ELECTROCARDIOGRAM TRACING: CPT | Performed by: NURSE PRACTITIONER

## 2022-10-09 PROCEDURE — 84484 ASSAY OF TROPONIN QUANT: CPT | Performed by: STUDENT IN AN ORGANIZED HEALTH CARE EDUCATION/TRAINING PROGRAM

## 2022-10-09 RX ADMIN — IPRATROPIUM BROMIDE AND ALBUTEROL SULFATE 3 ML: 2.5; .5 SOLUTION RESPIRATORY (INHALATION) at 11:34

## 2022-10-09 RX ADMIN — ACETAMINOPHEN 650 MG: 325 TABLET, FILM COATED ORAL at 06:23

## 2022-10-09 RX ADMIN — IPRATROPIUM BROMIDE AND ALBUTEROL SULFATE 3 ML: 2.5; .5 SOLUTION RESPIRATORY (INHALATION) at 07:31

## 2022-10-09 RX ADMIN — TAMSULOSIN HYDROCHLORIDE 0.4 MG: 0.4 CAPSULE ORAL at 20:10

## 2022-10-09 RX ADMIN — Medication 10 ML: at 20:11

## 2022-10-09 RX ADMIN — PANTOPRAZOLE SODIUM 40 MG: 40 TABLET, DELAYED RELEASE ORAL at 06:24

## 2022-10-09 RX ADMIN — MONTELUKAST SODIUM 10 MG: 10 TABLET, FILM COATED ORAL at 20:10

## 2022-10-09 RX ADMIN — CETIRIZINE HYDROCHLORIDE 5 MG: 10 TABLET ORAL at 08:26

## 2022-10-09 RX ADMIN — Medication 10 ML: at 08:18

## 2022-10-09 RX ADMIN — ANTACID TABLETS 2 TABLET: 500 TABLET, CHEWABLE ORAL at 08:26

## 2022-10-09 RX ADMIN — ACETAMINOPHEN 650 MG: 325 TABLET, FILM COATED ORAL at 20:10

## 2022-10-09 RX ADMIN — DULOXETINE HYDROCHLORIDE 60 MG: 60 CAPSULE, DELAYED RELEASE ORAL at 08:26

## 2022-10-09 RX ADMIN — MULTIPLE VITAMINS W/ MINERALS TAB 1 TABLET: TAB at 08:26

## 2022-10-09 RX ADMIN — IPRATROPIUM BROMIDE AND ALBUTEROL SULFATE 3 ML: 2.5; .5 SOLUTION RESPIRATORY (INHALATION) at 19:39

## 2022-10-09 NOTE — NURSING NOTE
Patient's heart rate is on afib and will go down to low 50's to high 90's. Asymptomatic. STAT EKG done this morning and Acute MI showed on EKG however patient also has brain stimulator and EKG is not capturing very well. Attending notified and STAT troponin ordered.

## 2022-10-09 NOTE — PLAN OF CARE
Goal Outcome Evaluation:  Plan of Care Reviewed With: patient        Progress: no change  Outcome Evaluation:     confused. VSS. 2L via n/c.     SR/ST on telemetry.   rate 80's-115 bpm.   Frequent PAC's and at times appears Afib. (asymptomatic)  Stat ECG obtained. - [facility] made aware    Q2 turning.   Accumax pump in use.   Mepliex to the coccyx and left elbow.   Heel boots applied.     SCD's on.     Oral care and suction provided.         No signs of distress. Will continue to monitor.

## 2022-10-09 NOTE — PROGRESS NOTES
Name: Boni Vega ADMIT: 10/1/2022   : 1940  PCP: CurtRadha duval    MRN: [id number] LOS: 6 days   AGE/SEX: 81 y.o. male  ROOM: [facility]     Subjective   Subjective   He is very hard of hearing.  Does not complain of any chest pain.       Objective   Objective   Vital Signs  Temp:  [97.5 °F (36.4 °C)-98.1 °F (36.7 °C)] 97.7 °F (36.5 °C)  Heart Rate:  [] 107  Resp:  [18-20] 20  BP: (147-177)/(83-91) 160/83  SpO2:  [92 %-100 %] 97 %  on  Flow (L/min):  [1-2] 2;   Device (Oxygen Therapy): room air  Body mass index is 24.28 kg/m².  Physical Exam  General: No acute distress, and comfortably without  HEENT: Normocephalic Intermedic  CV: Regular rate and rhythm, normal S1 and S2  Lungs: Clear to auscultation bilaterally, normal work of breathing on 1 2 L oxygen via nasal cannula  Abdomen: Soft, nontender, nondistended    Results Review     I reviewed the patient's new clinical results.  Results from last 7 days   Lab Units 10/07/22  0948 10/06/22  0610 10/05/22  2353 10/05/22  1838 10/05/22  1149 10/05/22  0559 10/04/22  0739 10/04/22  0641   WBC 10*3/mm3 9.75 10.41  --   --   --  10.71  --  12.74*   HEMOGLOBIN g/dL 9.8* 8.2* 8.3* 9.1*   < > 7.9*   < > 8.8*   PLATELETS 10*3/mm3 181 135*  --   --   --  109*  --  98*    < > = values in this interval not displayed.     Results from last 7 days   Lab Units 10/06/22  0601 10/05/22  1620 10/05/22  0559 10/04/22  0641 10/03/22  0946   SODIUM mmol/L 142  --  141 140 139   POTASSIUM mmol/L 3.7 3.9 3.3* 3.5 3.8   CHLORIDE mmol/L 112*  --  109* 107 106   CO2 mmol/L 23.6  --  26.0 24.4 25.4   BUN mg/dL 10  --  10 8 13   CREATININE mg/dL 0.85  --  0.77 0.48* 0.62*   GLUCOSE mg/dL 98  --  101* 103* 102*   Estimated Creatinine Clearance: 75.6 mL/min (by C-G formula based on SCr of 0.85 mg/dL).  Results from last 7 days   Lab Units 10/06/22  0601 10/05/22  0559 10/04/22  0641 10/03/22  0946   ALBUMIN g/dL 2.40* 2.40* 2.60* 2.80*   BILIRUBIN mg/dL 1.0 0.9 1.2 1.0   ALK  PHOS U/L 69 62 70 74   AST (SGOT) U/L 24 36 79* 177*   ALT (SGPT) U/L 74* 108* 186* 308*     Results from last 7 days   Lab Units 10/06/22  0601 10/05/22  0559 10/04/22  0641 10/03/22  0946   CALCIUM mg/dL 7.8* 7.6* 7.9* 8.0*   ALBUMIN g/dL 2.40* 2.40* 2.60* 2.80*   MAGNESIUM mg/dL  --   --   --  1.6   PHOSPHORUS mg/dL  --  3.0 1.5* 2.1*     Results from last 7 days   Lab Units 10/02/22  1614 10/02/22  1433   LACTATE mmol/L 2.0 2.2*     SARS-CoV-2, CAMILO   Date Value Ref Range Status   01/05/2022 NEGATIVE Negative Final     Comment:     The 2019-CoV rRT-PCR Assay is only for use under a Food and Drug Administration Emergency Use Authorization. The performance characteristics of the assay were verified by the Clinical Laboratory at [facility]. Results should be used in   conjunction with the patient's clinical symptoms, medical history, and other clinical/laboratory findings to determine an overall clinical diagnosis. Negative results do not preclude infection with SARS-CoV-2 (COVID-19).    Test parameters have not been validated for screening asymptomatic patients.     No results found for: HGBA1C, POCGLU    CT Abdomen With & Without Contrast  Narrative: CT ABDOMEN WITH AND WITHOUT CONTRAST     HISTORY: Liver lesion. For characterization.     TECHNIQUE: Noncontrast axial images of the abdomen were obtained  followed by administration of intravenous contrast and imaging of the  abdomen in the arterial and portal venous phase. Coronal and sagittal  reformats were then obtained.     COMPARISON: CT dated 10/01/2022     FINDINGS: Since the prior CT there is increasing density seen in the  perisplenic and perihepatic region most consistent with hemoperitoneum  and hematoma. There is a heterogeneous lesion seen within the superior  right hepatic lobe that measures approximately 9.1 x 6.0 cm. A second  lesion is seen along the inferior right hepatic lobe posteriorly  measuring approximately 8.9 x 6.1 cm that  is possibly hemorrhage and  ruptured. There is a third smaller lesion seen anteriorly within the  left hepatic lobe measuring up to 1.3 cm. The portal vein and its  intrahepatic branches are patent. The pancreas is normal. The spleen is  normal in size. Bilateral adrenal glands are normal. Both kidneys are  stable with partly imaged low-density lesions within the anterior cortex  of the midpole of the right kidney. The visualized bowel loops are  unremarkable. Marked atherosclerotic changes seen within the abdominal  aorta and its branches. Dilatation of the infrarenal abdominal aorta  measuring up to 3.2 cm with eccentric thrombus. There are markedly  atelectatic changes within bilateral lower lobes. Secretions and mucus  are seen within the partly aerated airways. Marked emphysematous change  is present.     Impression: There is increasing hemoperitoneum and hematoma identified today. The CT  findings are most concerning for hepatic lesion that is hemorrhage and  subsequently ruptured. The underlying lesion may represent a  hepatocellular carcinoma. There are additional lesions seen within the  liver as described above. These findings were discussed by telephone  with Dr. Vizcarra at the time of dictation.     Radiation dose reduction techniques were utilized, including automated  exposure control and exposure modulation based on body size.     This report was finalized on 10/5/2022 12:44 PM by Dr. Baldev Kraft M.D.       Scheduled Medications  cetirizine, 5 mg, Oral, Daily  DULoxetine, 60 mg, Oral, Daily  ipratropium-albuterol, 3 mL, Nebulization, 4x Daily - RT  montelukast, 10 mg, Oral, Nightly  multivitamin with minerals, 1 tablet, Oral, Daily  pantoprazole, 40 mg, Oral, Q AM  sodium chloride, 10 mL, Intravenous, Q12H  tamsulosin, 0.4 mg, Oral, Nightly    Infusions   Diet  Diet Regular       Assessment/Plan     Active Hospital Problems    Diagnosis  POA   • **Sigmoid diverticulitis [K57.32]  Unknown   •  COPD (chronic obstructive pulmonary disease) (HCC) [J44.9]  Unknown   • Benign essential tremor [G25.0]  Unknown   • 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       81 y.o. male admitted with Sigmoid diverticulitis.    Plan:     Acute diverticulitis resolved      Liver lesions possibly cancer and no plans for biopsy     Anemia of chronic disease stable with Hgb improved from 8.2 up to 9.8.       TCP resolved     COPD/hypoxemia with left upper lobe nodule and hilar lymphadenopathy              -Possible outpatient surveillance with imaging not really necessary     HTN stable     Dysphagia -regular diet with thin liquids.  No thickened liquids per family request     CODE STATUS DNR  SCDs for DVT ppx      Naveen Westbrook MD  [facility]  10/09/22  12:43 EDT     I wore protective equipment throughout this patient encounter including a face mask, gloves and protective eyewear.  Hand hygiene was performed before donning protective equipment and after removal when leaving the room.

## 2022-10-09 NOTE — PLAN OF CARE
Problem: Adult Inpatient Plan of Care  Goal: Optimal Comfort and Wellbeing  Intervention: Monitor Pain and Promote Comfort  Recent Flowsheet Documentation  Taken 10/9/2022 0930 by Lois Dill RN  Pain Management Interventions: see MAR  Intervention: Provide Person-Centered Care  Recent Flowsheet Documentation  Taken 10/9/2022 1436 by Lois Dill RN  Trust Relationship/Rapport:   care explained   thoughts/feelings acknowledged  Taken 10/9/2022 0930 by Lois Dill RN  Trust Relationship/Rapport:   care explained   thoughts/feelings acknowledged   Goal Outcome Evaluation:   Declining. Patient is now 4L oxygen. Elevated BP. Afib on monitor. Physician aware.

## 2022-10-10 PROCEDURE — 94799 UNLISTED PULMONARY SVC/PX: CPT

## 2022-10-10 PROCEDURE — 97530 THERAPEUTIC ACTIVITIES: CPT

## 2022-10-10 PROCEDURE — 94761 N-INVAS EAR/PLS OXIMETRY MLT: CPT

## 2022-10-10 PROCEDURE — 94664 DEMO&/EVAL PT USE INHALER: CPT

## 2022-10-10 RX ADMIN — IPRATROPIUM BROMIDE AND ALBUTEROL SULFATE 3 ML: 2.5; .5 SOLUTION RESPIRATORY (INHALATION) at 12:00

## 2022-10-10 RX ADMIN — MONTELUKAST SODIUM 10 MG: 10 TABLET, FILM COATED ORAL at 20:08

## 2022-10-10 RX ADMIN — CETIRIZINE HYDROCHLORIDE 5 MG: 10 TABLET ORAL at 09:07

## 2022-10-10 RX ADMIN — DULOXETINE HYDROCHLORIDE 60 MG: 60 CAPSULE, DELAYED RELEASE ORAL at 09:07

## 2022-10-10 RX ADMIN — IPRATROPIUM BROMIDE AND ALBUTEROL SULFATE 3 ML: 2.5; .5 SOLUTION RESPIRATORY (INHALATION) at 07:35

## 2022-10-10 RX ADMIN — IPRATROPIUM BROMIDE AND ALBUTEROL SULFATE 3 ML: 2.5; .5 SOLUTION RESPIRATORY (INHALATION) at 19:01

## 2022-10-10 RX ADMIN — TAMSULOSIN HYDROCHLORIDE 0.4 MG: 0.4 CAPSULE ORAL at 20:08

## 2022-10-10 RX ADMIN — IPRATROPIUM BROMIDE AND ALBUTEROL SULFATE 3 ML: 2.5; .5 SOLUTION RESPIRATORY (INHALATION) at 15:44

## 2022-10-10 RX ADMIN — MULTIPLE VITAMINS W/ MINERALS TAB 1 TABLET: TAB at 09:07

## 2022-10-10 RX ADMIN — Medication 10 ML: at 09:17

## 2022-10-10 RX ADMIN — PANTOPRAZOLE SODIUM 40 MG: 40 TABLET, DELAYED RELEASE ORAL at 06:40

## 2022-10-10 RX ADMIN — Medication 10 ML: at 20:09

## 2022-10-10 NOTE — PLAN OF CARE
Goal Outcome Evaluation:  Plan of Care Reviewed With: patient           Outcome Evaluation: Pt seen for OT/PT session this AM. Required max Ax2 for bed mobility. Sat EOB and participated in dynamic reaching task working on functional reach and sitting balance. Pt requried verbal and nonverbal cues throughout for upright posture. Poor carryover due to increased weakness and fatigue. Requried max A with sitting balance during activity. Assistance required at times to reach for object sitting EOB due to increased weakness. Pt able to wash face sitting EOB with cues and SBA. Mod-max A to comb hair sitting EOB. Pt to continue to benefit from skilled OT to address deficits and maximize independence with ADLs.

## 2022-10-10 NOTE — PLAN OF CARE
Problem: Adult Inpatient Plan of Care  Goal: Plan of Care Review  Outcome: Ongoing, Progressing  Flowsheets (Taken 10/10/2022 0322)  Progress: no change  Plan of Care Reviewed With: patient  Outcome Evaluation:   No s/sx of distress noted at this times. Rested some throughout night. Vital signs WDL. 02@3-4L NC   tolerated. Fall precautions and Turns maintained. Kept having to reapply heel boots and SCD's   patient kept pulling them off. Education provided. Will cont to monitor.  Goal: Patient-Specific Goal (Individualized)  Outcome: Ongoing, Progressing  Goal: Absence of Hospital-Acquired Illness or Injury  Outcome: Ongoing, Progressing  Intervention: Identify and Manage Fall Risk  Recent Flowsheet Documentation  Taken 10/10/2022 0200 by Maddy Packer, RN  Safety Promotion/Fall Prevention:   activity supervised   assistive device/personal items within reach   clutter free environment maintained   fall prevention program maintained   nonskid shoes/slippers when out of bed   room organization consistent   safety round/check completed  Taken 10/10/2022 0003 by Maddy Packer, RN  Safety Promotion/Fall Prevention:   activity supervised   assistive device/personal items within reach   clutter free environment maintained   fall prevention program maintained   elopement precautions   lighting adjusted   nonskid shoes/slippers when out of bed   room organization consistent   safety round/check completed  Taken 10/9/2022 2211 by Maddy Packer, RN  Safety Promotion/Fall Prevention:   activity supervised   assistive device/personal items within reach   clutter free environment maintained   fall prevention program maintained   lighting adjusted   nonskid shoes/slippers when out of bed   room organization consistent   safety round/check completed  Taken 10/9/2022 2003 by Maddy Packer, RN  Safety Promotion/Fall Prevention:   activity supervised   assistive device/personal items within reach   clutter free environment maintained    fall prevention program maintained   lighting adjusted   nonskid shoes/slippers when out of bed   room organization consistent   safety round/check completed  Intervention: Prevent Skin Injury  Recent Flowsheet Documentation  Taken 10/10/2022 0200 by Maddy Packer RN  Body Position:   left   tilted  Taken 10/10/2022 0003 by Maddy Packer RN  Body Position:   right   tilted  Skin Protection:   adhesive use limited   incontinence pads utilized   transparent dressing maintained   tubing/devices free from skin contact   silicone foam dressing in place  Taken 10/9/2022 2211 by Maddy Packer RN  Body Position:   left   tilted  Taken 10/9/2022 2003 by Maddy Packer RN  Body Position:   right   tilted  Skin Protection:   adhesive use limited   incontinence pads utilized   transparent dressing maintained   tubing/devices free from skin contact  Intervention: Prevent and Manage VTE (Venous Thromboembolism) Risk  Recent Flowsheet Documentation  Taken 10/10/2022 0200 by Maddy Packer RN  Activity Management: activity adjusted per tolerance  Taken 10/10/2022 0003 by Maddy Packer RN  Activity Management: activity adjusted per tolerance  VTE Prevention/Management:   bilateral   sequential compression devices on  Taken 10/9/2022 2211 by Maddy Packer RN  Activity Management: activity adjusted per tolerance  Taken 10/9/2022 2003 by Maddy Packer RN  Activity Management: activity adjusted per tolerance  VTE Prevention/Management: (pt keeps pulling off SCD's/ will try to reapply at this time)   bilateral   sequential compression devices off  Intervention: Prevent Infection  Recent Flowsheet Documentation  Taken 10/10/2022 0200 by Maddy Packer RN  Infection Prevention:   hand hygiene promoted   rest/sleep promoted   single patient room provided  Taken 10/10/2022 0003 by Maddy Packer RN  Infection Prevention:   hand hygiene promoted   rest/sleep promoted   single patient room provided  Taken 10/9/2022 2211 by Maddy Packer  RN  Infection Prevention:   hand hygiene promoted   single patient room provided   rest/sleep promoted  Taken 10/9/2022 2003 by Maddy Packer RN  Infection Prevention:   hand hygiene promoted   rest/sleep promoted   single patient room provided  Goal: Optimal Comfort and Wellbeing  Outcome: Ongoing, Progressing  Intervention: Provide Person-Centered Care  Recent Flowsheet Documentation  Taken 10/10/2022 0003 by Maddy Packer RN  Trust Relationship/Rapport:   choices provided   care explained   emotional support provided   reassurance provided   thoughts/feelings acknowledged  Taken 10/9/2022 2003 by Maddy Packer RN  Trust Relationship/Rapport:   care explained   reassurance provided   thoughts/feelings acknowledged  Goal: Readiness for Transition of Care  Outcome: Ongoing, Progressing     Problem: Fall Injury Risk  Goal: Absence of Fall and Fall-Related Injury  Outcome: Ongoing, Progressing  Intervention: Identify and Manage Contributors  Recent Flowsheet Documentation  Taken 10/10/2022 0200 by Maddy Packer RN  Medication Review/Management: medications reviewed  Taken 10/10/2022 0003 by Maddy Packer RN  Medication Review/Management: medications reviewed  Taken 10/9/2022 2211 by Maddy Packer RN  Medication Review/Management: medications reviewed  Taken 10/9/2022 2003 by Maddy Packer RN  Medication Review/Management: medications reviewed  Intervention: Promote Injury-Free Environment  Recent Flowsheet Documentation  Taken 10/10/2022 0200 by Maddy Packer RN  Safety Promotion/Fall Prevention:   activity supervised   assistive device/personal items within reach   clutter free environment maintained   fall prevention program maintained   nonskid shoes/slippers when out of bed   room organization consistent   safety round/check completed  Taken 10/10/2022 0003 by Maddy Packer RN  Safety Promotion/Fall Prevention:   activity supervised   assistive device/personal items within reach   clutter free environment  maintained   fall prevention program maintained   elopement precautions   lighting adjusted   nonskid shoes/slippers when out of bed   room organization consistent   safety round/check completed  Taken 10/9/2022 2211 by Maddy Packer RN  Safety Promotion/Fall Prevention:   activity supervised   assistive device/personal items within reach   clutter free environment maintained   fall prevention program maintained   lighting adjusted   nonskid shoes/slippers when out of bed   room organization consistent   safety round/check completed  Taken 10/9/2022 2003 by Maddy Packer RN  Safety Promotion/Fall Prevention:   activity supervised   assistive device/personal items within reach   clutter free environment maintained   fall prevention program maintained   lighting adjusted   nonskid shoes/slippers when out of bed   room organization consistent   safety round/check completed     Problem: Skin Injury Risk Increased  Goal: Skin Health and Integrity  Outcome: Ongoing, Progressing  Intervention: Optimize Skin Protection  Recent Flowsheet Documentation  Taken 10/10/2022 0200 by Maddy Packer RN  Head of Bed (HOB) Positioning: HOB at 30-45 degrees  Taken 10/10/2022 0003 by Maddy Packer RN  Pressure Reduction Techniques:   frequent weight shift encouraged   heels elevated off bed   weight shift assistance provided  Head of Bed (HOB) Positioning: HOB at 30 degrees  Pressure Reduction Devices:   alternating pressure pump (ADD)   pressure-redistributing mattress utilized   heel offloading device utilized  Skin Protection:   adhesive use limited   incontinence pads utilized   transparent dressing maintained   tubing/devices free from skin contact   silicone foam dressing in place  Taken 10/9/2022 2211 by Maddy Packer RN  Head of Bed (HOB) Positioning: HOB at 30-45 degrees  Taken 10/9/2022 2003 by Maddy Packer RN  Pressure Reduction Techniques: (heel boots on)   frequent weight shift encouraged   heels elevated off bed   weight  shift assistance provided  Head of Bed (HOB) Positioning: HOB at 30-45 degrees  Pressure Reduction Devices:   alternating pressure pump (ADD)   positioning supports utilized   pressure-redistributing mattress utilized  Skin Protection:   adhesive use limited   incontinence pads utilized   transparent dressing maintained   tubing/devices free from skin contact     Problem: Infection (Intestinal Obstruction)  Goal: Absence of Infection Signs and Symptoms  Outcome: Ongoing, Progressing     Problem: Pain (Intestinal Obstruction)  Goal: Acceptable Pain Control  Outcome: Ongoing, Progressing   Goal Outcome Evaluation:  Plan of Care Reviewed With: patient        Progress: no change  Outcome Evaluation: No s/sx of distress noted at this times. Rested some throughout night. Vital signs WDL. 02@3-4L NC; tolerated. Fall precautions and Turns maintained. Kept having to reapply heel boots and SCD's; patient kept pulling them off. Education provided. Will cont to monitor.

## 2022-10-10 NOTE — PLAN OF CARE
Goal Outcome Evaluation: Pt resting in bed with no signs of distress noted at this time. Pt is waiting on pre-cert for [place]. VS stable. Bed in lowest position with siderails up X3. Call light within reach. RN will continue to monitor.

## 2022-10-10 NOTE — PLAN OF CARE
Goal Outcome Evaluation:  Plan of Care Reviewed With: patient        Progress: improving  Outcome Evaluation: Pt tolerated treatment fair this date. Required max A x2 for bed mobility, then max A x1-2 to maintain static sitting balance. Pt demonstrated heavy posterior lean thorughout, w/ cues to correct, though poor carryover d/t weakness. Pt also very limited d/t fatigue.

## 2022-10-10 NOTE — THERAPY TREATMENT NOTE
Patient Name: Boni Vega  : 1940    MRN: [id number]                              Today's Date: 10/10/2022       Admit Date: 10/1/2022    Visit Dx:     ICD-10-CM ICD-9-CM   1. Diverticulitis  K57.92 562.11   2. Sepsis, due to unspecified organism, unspecified whether acute organ dysfunction present ([facility])  A41.9 038.9     995.91   3. Abnormal CT of the chest  R93.89 793.2   4. Abnormal CT of the abdomen  R93.5 793.6     Patient Active Problem List   Diagnosis   • Diverticulitis   • COPD (chronic obstructive pulmonary disease) ([facility])   • Benign essential tremor   • Status post deep brain stimulator placement   • Sigmoid diverticulitis   • Liver lesion   • History of CVA (cerebrovascular accident)   • History of aspiration pneumonia   • Essential hypertension     Past Medical History:   Diagnosis Date   • COPD (chronic obstructive pulmonary disease) ([facility])    • S/P deep brain stimulator placement     essential tremors--15 yrs ago   • Stroke ([facility])     left hand dexterity affected and aspiration issues after--15 yrs ago     Past Surgical History:   Procedure Laterality Date   • CHOLECYSTECTOMY     • HIP FRACTURE SURGERY Left     1 1/2 yrs ago---has 2 large screws in place   • KYPHOPLASTY      1 1/2 yrs ago      General Information     Row Name 10/10/22 8766          Physical Therapy Time and Intention    Document Type therapy note (daily note)  -     Mode of Treatment physical therapy  -     Row Name 10/10/22 1346          General Information    Existing Precautions/Restrictions fall;oxygen therapy device and L/min  -     Row Name 10/10/22 1346          Cognition    Orientation Status (Cognition) oriented to;person;place  -           User Key  (r) = Recorded By, (t) = Taken By, (c) = Cosigned By    Initials Name Provider Type     Connie Cedeno PTA Physical Therapist Assistant               Mobility     Row Name 10/10/22 6076          Bed Mobility    Bed Mobility supine-sit;sit-supine  -      Supine-Sit [place] (Bed Mobility) maximum assist (25% patient effort);2 person assist  -SM     Sit-Supine [place] (Bed Mobility) maximum assist (25% patient effort);2 person assist  -SM     Assistive Device (Bed Mobility) bed rails;head of bed elevated  -SM     Row Name 10/10/22 1346          Sit-Stand Transfer    Comment, (Sit-Stand Transfer) unable to attempt d/t severe posterior lean while sitting EOB  -SM           User Key  (r) = Recorded By, (t) = Taken By, (c) = Cosigned By    Initials Name Provider Type    Connie Rider PTA Physical Therapist Assistant               Obj/Interventions     Row Name 10/10/22 1347          Balance    Comment, Balance static sitting balance w/ max A x1-2; pt demonstrating heavy posterior lean, w/ several cues to correct, though poor carryover d/t weakness  -SM           User Key  (r) = Recorded By, (t) = Taken By, (c) = Cosigned By    Initials Name Provider Type    Connie Rider PTA Physical Therapist Assistant               Goals/Plan    No documentation.                Clinical Impression     Row Name 10/10/22 1348          Pain    Pretreatment Pain Rating 0/10 - no pain  -SM     Posttreatment Pain Rating 0/10 - no pain  -SM     Row Name 10/10/22 1348          Positioning and Restraints    Pre-Treatment Position in bed  -SM     Post Treatment Position bed  -SM     In Bed supine;call light within reach;encouraged to call for assist;exit alarm on  -SM           User Key  (r) = Recorded By, (t) = Taken By, (c) = Cosigned By    Initials Name Provider Type    Connie Rider PTA Physical Therapist Assistant               Outcome Measures     Row Name 10/10/22 1348 10/10/22 0907       How much help from another person do you currently need...    Turning from your back to your side while in flat bed without using bedrails? 2  -SM 1  -ES    Moving from lying on back to sitting on the side of a flat bed without bedrails? 2  -SM 1  -ES    Moving to  and from a bed to a chair (including a wheelchair)? 1  -SM 1  -ES    Standing up from a chair using your arms (e.g., wheelchair, bedside chair)? 1  -SM 1  -ES    Climbing 3-5 steps with a railing? 1  -SM 1  -ES    To walk in hospital room? 1  -SM 1  -ES    AM-PAC 6 Clicks Score (PT) 8  - 6  -ES    Highest level of mobility 3 --> Sat at edge of bed  -SM 2 --> Bed activities/dependent transfer  -ES    Row Name 10/10/22 1348          Functional Assessment    Outcome Measure Options AM-PAC 6 Clicks Basic Mobility (PT)  -           User Key  (r) = Recorded By, (t) = Taken By, (c) = Cosigned By    Initials Name Provider Type    Connie Rider PTA Physical Therapist Assistant    Malina Garcia RN Registered Nurse                             Physical Therapy Education     Title: PT OT SLP Therapies (In Progress)     Topic: Physical Therapy (In Progress)     Point: Mobility training (In Progress)     Learning Progress Summary           Patient Acceptance, E,D, NR by  at 10/10/2022 1349    Acceptance, E,TB,D, VU,NR by  at 10/8/2022 1308    Acceptance, E, NR,VU by AG at 10/6/2022 1659    Comment: pt is confused    Acceptance, E, NR by DB at 10/6/2022 1327   Family Acceptance, E, NR,VU by AG at 10/6/2022 1659    Comment: pt is confused                   Point: Home exercise program (In Progress)     Learning Progress Summary           Patient Acceptance, E,D, NR by  at 10/10/2022 1349    Acceptance, E,TB,D, VU,NR by  at 10/8/2022 1308    Acceptance, E, NR,VU by AG at 10/6/2022 1659    Comment: pt is confused    Acceptance, E, NR by DB at 10/6/2022 1327   Family Acceptance, E, NR,VU by AG at 10/6/2022 1659    Comment: pt is confused                   Point: Body mechanics (In Progress)     Learning Progress Summary           Patient Acceptance, E,D, NR by  at 10/10/2022 1349    Acceptance, E,TB,D, VU,NR by  at 10/8/2022 1308    Acceptance, E, NR,VU by AG at 10/6/2022 3943    Comment: pt is  confused    Acceptance, E, NR by DB at 10/6/2022 1327   Family Acceptance, E, NR,VU by AG at 10/6/2022 1659    Comment: pt is confused                   Point: Precautions (In Progress)     Learning Progress Summary           Patient Acceptance, E,D, NR by  at 10/10/2022 1349    Acceptance, E,TB,D, VU,NR by  at 10/8/2022 1308    Acceptance, E, NR,VU by AG at 10/6/2022 1659    Comment: pt is confused    Acceptance, E, NR by DB at 10/6/2022 1327   Family Acceptance, E, NR,VU by AG at 10/6/2022 1659    Comment: pt is confused                               User Key     Initials Effective Dates Name Provider Type Discipline     03/07/18 -  Connie Cedeno PTA Physical Therapist Assistant PT    DB 06/16/21 -  Jaclyn Fang PT Physical Therapist PT     06/16/21 -  Sheree Hills, RN Registered Nurse Nurse              PT Recommendation and Plan     Plan of Care Reviewed With: patient  Progress: improving  Outcome Evaluation: Pt tolerated treatment fair this date. Required max A x2 for bed mobility, then max A x1-2 to maintain static sitting balance. Pt demonstrated heavy posterior lean thorughout, w/ cues to correct, though poor carryover d/t weakness. Pt also very limited d/t fatigue.     Time Calculation:    PT Charges     Row Name 10/10/22 1350             Time Calculation    Start Time 1020  -      Stop Time 1043  -      Time Calculation (min) 23 min  -      PT Received On 10/10/22  -      PT - Next Appointment 10/11/22  -            User Key  (r) = Recorded By, (t) = Taken By, (c) = Cosigned By    Initials Name Provider Type     Connie Cedeno PTA Physical Therapist Assistant              Therapy Charges for Today     Code Description Service Date Service Provider Modifiers Qty    56889255391 HC PT THERAPEUTIC ACT EA 15 MIN 10/10/2022 Connie Cedeno PTA GP 2    41133523864 HC PT THER SUPP EA 15 MIN 10/10/2022 Connie Cedeno PTA GP 2          PT G-Codes  Outcome  Measure Options: AM-PAC 6 Clicks Basic Mobility (PT)  AM-PAC 6 Clicks Score (PT): 8  AM-PAC 6 Clicks Score (OT): 11    Connie Cedeno, PTA  10/10/2022

## 2022-10-10 NOTE — THERAPY TREATMENT NOTE
Patient Name: Boni Vega  : 1940    MRN: [id number]                              Today's Date: 10/10/2022       Admit Date: 10/1/2022    Visit Dx:     ICD-10-CM ICD-9-CM   1. Diverticulitis  K57.92 562.11   2. Sepsis, due to unspecified organism, unspecified whether acute organ dysfunction present ([facility])  A41.9 038.9     995.91   3. Abnormal CT of the chest  R93.89 793.2   4. Abnormal CT of the abdomen  R93.5 793.6     Patient Active Problem List   Diagnosis   • Diverticulitis   • COPD (chronic obstructive pulmonary disease) ([facility])   • Benign essential tremor   • Status post deep brain stimulator placement   • Sigmoid diverticulitis   • Liver lesion   • History of CVA (cerebrovascular accident)   • History of aspiration pneumonia   • Essential hypertension     Past Medical History:   Diagnosis Date   • COPD (chronic obstructive pulmonary disease) ([facility])    • S/P deep brain stimulator placement     essential tremors--15 yrs ago   • Stroke ([facility])     left hand dexterity affected and aspiration issues after--15 yrs ago     Past Surgical History:   Procedure Laterality Date   • CHOLECYSTECTOMY     • HIP FRACTURE SURGERY Left     1 1/2 yrs ago---has 2 large screws in place   • KYPHOPLASTY      1 1/2 yrs ago      General Information     Row Name 10/10/22 1413          OT Time and Intention    Document Type therapy note (daily note)  -KA     Mode of Treatment individual therapy;occupational therapy  -     Row Name 10/10/22 1413          General Information    Patient Profile Reviewed yes  -KA     Existing Precautions/Restrictions fall;oxygen therapy device and L/min  -KISHOR     Row Name 10/10/22 1413          Cognition    Orientation Status (Cognition) oriented to;person;place  -     Row Name 10/10/22 1413          Safety Issues, Functional Mobility    Impairments Affecting Function (Mobility) balance;strength;endurance/activity tolerance;cognition  -KA           User Key  (r) = Recorded By, (t) = Taken By, (c) =  Cosigned By    Initials Name Provider Type    Melody Tripathi OT Occupational Therapist                 Mobility/ADL's     Row Name 10/10/22 1413          Bed Mobility    Supine-Sit [place] (Bed Mobility) maximum assist (25% patient effort);2 person assist  -     Sit-Supine [place] (Bed Mobility) maximum assist (25% patient effort);2 person assist  -     Assistive Device (Bed Mobility) bed rails;head of bed elevated  -[facility] Name 10/10/22 [facility]3          Sit-Stand Transfer    Comment, (Sit-Stand Transfer) unable to safely attempt today. Severe posterior lean while seated EOB and max A for sitting balance  -     Row Name 10/10/22 [facility]3          Functional Mobility    Functional Mobility- Ind. Level not tested  -[facility] Name 10/10/22 [facility]          Activities of Daily Living    BADL Assessment/Intervention grooming  -KA     Row Name 10/10/22 [facility]          Lower Body Dressing Assessment/Training    [place] Level (Lower Body Dressing) lower body dressing skills;doff;don;dependent (less than 25% patient effort);socks  -[facility] Name 10/10/22 [facility]          Grooming Assessment/Training    [place] Level (Grooming) grooming skills;standby assist;wash face, hands  -     Position (Grooming) edge of bed sitting  -           User Key  (r) = Recorded By, (t) = Taken By, (c) = Cosigned By    Initials Name Provider Type    Melody Tripathi OT Occupational Therapist               Obj/Interventions     [facility] Name 10/10/22 1414          Motor Skills    Functional Endurance fatigues quickly with activity sitting EOB  -[facility] Name 10/10/22 1414          Balance    Static Sitting Balance maximum assist;non-verbal cues (demo/gesture);verbal cues  -     Dynamic Sitting Balance maximum assist;verbal cues;non-verbal cues (demo/gesture)  -     Position, Sitting Balance sitting edge of bed  -     Balance Interventions sitting;occupation based/functional task  -     Comment, Balance  Participated in dynamic reaching sitting EOB. Required max A for balance throughout task. Heavy posterior lean with cues to correct  -           User Key  (r) = Recorded By, (t) = Taken By, (c) = Cosigned By    Initials Name Provider Type    Mleody Tripathi OT Occupational Therapist               Goals/Plan    No documentation.                Clinical Impression     Row Name 10/10/22 1416          Pain Assessment    Pretreatment Pain Rating 0/10 - no pain  -     Posttreatment Pain Rating 0/10 - no pain  -     Row Name 10/10/22 [facility]6          Plan of Care Review    Plan of Care Reviewed With patient  -     Outcome Evaluation Pt seen for OT/PT session this AM. Required max Ax2 for bed mobility. Sat EOB and participated in dynamic reaching task working on functional reach and sitting balance. Pt requried verbal and nonverbal cues throughout for upright posture. Poor carryover due to increased weakness and fatigue. Requried max A with sitting balance during activity. Assistance required at times to reach for object sitting EOB due to increased weakness. Pt able to wash face sitting EOB with cues and SBA. Mod-max A to comb hair sitting EOB. Pt to continue to benefit from skilled OT to address deficits and maximize independence with ADLs.  -     Row Name 10/10/22 1416          Therapy Plan Review/Discharge Plan (OT)    Anticipated Discharge Disposition (OT) skilled nursing facility  -     Row Name 10/10/22 1416          Vital Signs    Pre Patient Position Supine  -     Intra Patient Position Sitting  -     Post Patient Position Supine  -     Row Name 10/10/22 1416          Positioning and Restraints    Pre-Treatment Position in bed  -     Post Treatment Position bed  -     In Bed supine;call light within reach;encouraged to call for assist;exit alarm on  -           User Key  (r) = Recorded By, (t) = Taken By, (c) = Cosigned By    Initials Name Provider Type    Melody Tripathi, OT  Occupational Therapist               Outcome Measures     Row Name 10/10/22 1419          How much help from another is currently needed...    Putting on and taking off regular lower body clothing? 1  -KA     Bathing (including washing, rinsing, and drying) 1  -KA     Toileting (which includes using toilet bed pan or urinal) 1  -KA     Putting on and taking off regular upper body clothing 2  -KA     Taking care of personal grooming (such as brushing teeth) 2  -KA     Eating meals 2  -KA     AM-PAC 6 Clicks Score (OT) 9  -KA     Row Name 10/10/22 1348 10/10/22 0907       How much help from another person do you currently need...    Turning from your back to your side while in flat bed without using bedrails? 2  -SM 1  -ES    Moving from lying on back to sitting on the side of a flat bed without bedrails? 2  -SM 1  -ES    Moving to and from a bed to a chair (including a wheelchair)? 1  -SM 1  -ES    Standing up from a chair using your arms (e.g., wheelchair, bedside chair)? 1  -SM 1  -ES    Climbing 3-5 steps with a railing? 1  -SM 1  -ES    To walk in hospital room? 1  -SM 1  -ES    AM-PAC 6 Clicks Score (PT) 8  -SM 6  -ES    Highest level of mobility 3 --> Sat at edge of bed  -SM 2 --> Bed activities/dependent transfer  -ES    Row Name 10/10/22 1419 10/10/22 1348       Functional Assessment    Outcome Measure Options AM-PAC 6 Clicks Daily Activity (OT)  -KA AM-PAC 6 Clicks Basic Mobility (PT)  -SM          User Key  (r) = Recorded By, (t) = Taken By, (c) = Cosigned By    Initials Name Provider Type    Connie Rider PTA Physical Therapist Assistant    Malina Garcia RN Registered Nurse    Melody Tripathi OT Occupational Therapist                Occupational Therapy Education     Title: PT OT SLP Therapies (In Progress)     Topic: Occupational Therapy (Done)     Point: ADL training (Done)     Description:   Instruct learner(s) on proper safety adaptation and remediation techniques during self  care or transfers.   Instruct in proper use of assistive devices.              Learning Progress Summary           Patient Acceptance, E, NR,VU by  at 10/6/2022 1659    Comment: pt is confused    Acceptance, E, VU by  at 10/6/2022 1225   Family Acceptance, E, NR,VU by  at 10/6/2022 1659    Comment: pt is confused                               User Key     Initials Effective Dates Name Provider Type Discipline     06/16/21 -  Sheree Hills, RN Registered Nurse Nurse     09/22/22 -  Melody Villaseñor OT Occupational Therapist OT              OT Recommendation and Plan  Planned Therapy Interventions (OT): activity tolerance training, functional balance retraining, occupation/activity based interventions, ROM/therapeutic exercise, strengthening exercise, transfer/mobility retraining, patient/caregiver education/training, BADL retraining  Therapy Frequency (OT): 3 times/wk  Plan of Care Review  Plan of Care Reviewed With: patient  Outcome Evaluation: Pt seen for OT/PT session this AM. Required max Ax2 for bed mobility. Sat EOB and participated in dynamic reaching task working on functional reach and sitting balance. Pt requried verbal and nonverbal cues throughout for upright posture. Poor carryover due to increased weakness and fatigue. Requried max A with sitting balance during activity. Assistance required at times to reach for object sitting EOB due to increased weakness. Pt able to wash face sitting EOB with cues and SBA. Mod-max A to comb hair sitting EOB. Pt to continue to benefit from skilled OT to address deficits and maximize independence with ADLs.     Time Calculation:    Time Calculation- OT     Row Name 10/10/22 1420             Time Calculation- OT    OT Start Time 1020  -KA      OT Stop Time 1043  -      OT Time Calculation (min) 23 min  -      Total Timed Code Minutes- OT 23 minute(s)  -      OT Received On 10/10/22  -      OT - Next Appointment 10/12/22  -         Timed  Charges    06486 - OT Therapeutic Activity Minutes 23  -KA         Total Minutes    Timed Charges Total Minutes 23  -KA       Total Minutes 23  -KA            User Key  (r) = Recorded By, (t) = Taken By, (c) = Cosigned By    Initials Name Provider Type    Melody Tripathi OT Occupational Therapist              Therapy Charges for Today     Code Description Service Date Service Provider Modifiers Qty    51953181285  OT THERAPEUTIC ACT EA 15 MIN 10/10/2022 Melody Villaseñor OT GO 2               Melody Villaseñor OT  10/10/2022

## 2022-10-11 PROCEDURE — 94664 DEMO&/EVAL PT USE INHALER: CPT

## 2022-10-11 PROCEDURE — 94799 UNLISTED PULMONARY SVC/PX: CPT

## 2022-10-11 PROCEDURE — 94760 N-INVAS EAR/PLS OXIMETRY 1: CPT

## 2022-10-11 PROCEDURE — 94761 N-INVAS EAR/PLS OXIMETRY MLT: CPT

## 2022-10-11 RX ADMIN — IPRATROPIUM BROMIDE AND ALBUTEROL SULFATE 3 ML: 2.5; .5 SOLUTION RESPIRATORY (INHALATION) at 11:38

## 2022-10-11 RX ADMIN — IPRATROPIUM BROMIDE AND ALBUTEROL SULFATE 3 ML: 2.5; .5 SOLUTION RESPIRATORY (INHALATION) at 07:56

## 2022-10-11 RX ADMIN — DULOXETINE HYDROCHLORIDE 60 MG: 60 CAPSULE, DELAYED RELEASE ORAL at 09:32

## 2022-10-11 RX ADMIN — MONTELUKAST SODIUM 10 MG: 10 TABLET, FILM COATED ORAL at 20:27

## 2022-10-11 RX ADMIN — TAMSULOSIN HYDROCHLORIDE 0.4 MG: 0.4 CAPSULE ORAL at 20:27

## 2022-10-11 RX ADMIN — CETIRIZINE HYDROCHLORIDE 5 MG: 10 TABLET ORAL at 09:32

## 2022-10-11 RX ADMIN — MULTIPLE VITAMINS W/ MINERALS TAB 1 TABLET: TAB at 09:32

## 2022-10-11 RX ADMIN — Medication 10 ML: at 09:32

## 2022-10-11 RX ADMIN — IPRATROPIUM BROMIDE AND ALBUTEROL SULFATE 3 ML: 2.5; .5 SOLUTION RESPIRATORY (INHALATION) at 14:43

## 2022-10-11 RX ADMIN — Medication 10 ML: at 20:27

## 2022-10-11 RX ADMIN — PANTOPRAZOLE SODIUM 40 MG: 40 TABLET, DELAYED RELEASE ORAL at 05:03

## 2022-10-11 RX ADMIN — IPRATROPIUM BROMIDE AND ALBUTEROL SULFATE 3 ML: 2.5; .5 SOLUTION RESPIRATORY (INHALATION) at 22:06

## 2022-10-11 NOTE — PLAN OF CARE
Problem: Adult Inpatient Plan of Care  Goal: Plan of Care Review  Flowsheets (Taken 10/11/2022 4402)  Outcome Evaluation: Alert only to self. Down to 2 L nasal cannula. Q2 turns. Meds crushed in apple sauce. Heel boots. Patient has frequent wet cough whenever he eats or drinks. Patient's POA does not want him on any thickened liquids. Suction at bedside, suctioned frequently throughout the shift, sits straight up when eating. Inpatient [facility]/hospice consult placed.   Goal Outcome Evaluation:              Outcome Evaluation: Alert only to self. Down to 2 L nasal cannula. Q2 turns. Meds crushed in apple sauce. Heel boots. Patient has frequent wet cough whenever he eats or drinks. Patient's POA does not want him on any thickened liquids. Suction at bedside, suctioned frequently throughout the shift, sits straight up when eating. Inpatient [facility]/hospice consult placed.

## 2022-10-11 NOTE — PROGRESS NOTES
Name: Boni Vega ADMIT: 10/1/2022   : 1940  PCP: Radha Mata    MRN: [id number] LOS: 8 days   AGE/SEX: 81 y.o. male  ROOM: [facility]     Subjective   Subjective   CC: abdominal pain  No acute events. Patient has no new complaints. Taking PO, on modified diet.     Objective   Objective   Vital Signs  Temp:  [97.4 °F (36.3 °C)-98.1 °F (36.7 °C)] 98 °F (36.7 °C)  Heart Rate:  [] 61  Resp:  [16-22] 18  BP: (117-168)/() 168/96  SpO2:  [92 %-98 %] 97 %  on  Flow (L/min):  [4-4.5] 4;   Device (Oxygen Therapy): nasal cannula  Body mass index is 24.28 kg/m².  Physical Exam  Vitals and nursing note reviewed.   Constitutional:       General: He is not in acute distress.     Appearance: He is ill-appearing. He is not toxic-appearing or diaphoretic.   HENT:      Head: Normocephalic and atraumatic.      Nose: Nose normal.      Mouth/Throat:      Mouth: Mucous membranes are moist.      Pharynx: Oropharynx is clear.   Eyes:      Conjunctiva/sclera: Conjunctivae normal.      Pupils: Pupils are equal, round, and reactive to light.   Cardiovascular:      Rate and Rhythm: Normal rate and regular rhythm.      Pulses: Normal pulses.   Pulmonary:      Effort: Pulmonary effort is normal.      Breath sounds: Examination of the right-lower field reveals decreased breath sounds. Examination of the left-lower field reveals decreased breath sounds. Decreased breath sounds present.   Abdominal:      General: Bowel sounds are normal.      Palpations: Abdomen is soft.      Tenderness: There is abdominal tenderness (mild, diffuse). There is no guarding or rebound.   Musculoskeletal:         General: No swelling or tenderness.      Cervical back: Normal range of motion and neck supple.   Skin:     General: Skin is warm and dry.      Capillary Refill: Capillary refill takes less than 2 seconds.   Neurological:      General: No focal deficit present.      Mental Status: He is alert.   Psychiatric:         Mood and Affect:  Mood normal.         Behavior: Behavior normal.         Cognition and Memory: Cognition is impaired. Memory is impaired.       Results Review     I reviewed the patient's new clinical results.  I reviewed the patient's telemetry.  Results from last 7 days   Lab Units 10/07/22  0948 10/06/22  0610 10/05/22  2353 10/05/22  1838 10/05/22  1149 10/05/22  0559   WBC 10*3/mm3 9.75 10.41  --   --   --  10.71   HEMOGLOBIN g/dL 9.8* 8.2* 8.3* 9.1*   < > 7.9*   PLATELETS 10*3/mm3 181 135*  --   --   --  109*    < > = values in this interval not displayed.     Results from last 7 days   Lab Units 10/06/22  0601 10/05/22  1620 10/05/22  0559   SODIUM mmol/L 142  --  141   POTASSIUM mmol/L 3.7 3.9 3.3*   CHLORIDE mmol/L 112*  --  109*   CO2 mmol/L 23.6  --  26.0   BUN mg/dL 10  --  10   CREATININE mg/dL 0.85  --  0.77   GLUCOSE mg/dL 98  --  101*   EGFR mL/min/1.73 86.2  --  88.8     Results from last 7 days   Lab Units 10/06/22  0601 10/05/22  0559   ALBUMIN g/dL 2.40* 2.40*   BILIRUBIN mg/dL 1.0 0.9   ALK PHOS U/L 69 62   AST (SGOT) U/L 24 36   ALT (SGPT) U/L 74* 108*     Results from last 7 days   Lab Units 10/06/22  0601 10/05/22  0559   CALCIUM mg/dL 7.8* 7.6*   ALBUMIN g/dL 2.40* 2.40*   PHOSPHORUS mg/dL  --  3.0         No results found for: HGBA1C, POCGLU    No radiology results for the last day  Scheduled Medications  cetirizine, 5 mg, Oral, Daily  DULoxetine, 60 mg, Oral, Daily  ipratropium-albuterol, 3 mL, Nebulization, 4x Daily - RT  montelukast, 10 mg, Oral, Nightly  multivitamin with minerals, 1 tablet, Oral, Daily  pantoprazole, 40 mg, Oral, Q AM  sodium chloride, 10 mL, Intravenous, Q12H  tamsulosin, 0.4 mg, Oral, Nightly    Infusions   Diet  Diet Regular; GI Soft       Assessment/Plan     Active Hospital Problems    Diagnosis  POA   • **Sigmoid diverticulitis [K57.32]  Unknown   • COPD (chronic obstructive pulmonary disease) (HCC) [J44.9]  Unknown   • Benign essential tremor [G25.0]  Unknown   • 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    - CT chest showed 1.5 cm SHAR nodule in addition to a left hilar node 2.3 x1.5 cm - appreciate pulmonology recs  - biopsy considered but not in line with family goals of care, no plans for follow up on this     Essential hypertension  - blood pressure acceptable  - continue holding antihypertensives     Essential tremor  - status post deep brain stimulator     Chronic neck pain/degenerative disc disease status post low back surgery  - previously received steroid injections to the neck, pain much improved afterwards  - Continue as needed pain medication      SCDs for DVT prophylaxis.  DNR.  Discussed with patient, family, nursing staff and CCP.  Anticipate discharge to SNU facility once arrangements have been made.    I discussed with his daughter over the phone regarding denial of rehab from outside facility given his  functional status. I explained that options at this point would likely be long term care vs inpatient palliative with possible transition to home hospice depending on clinical progress. She told me that they had anticipated palliative or hospice when he got back from rehab but is open to considering transition to inpatient palliative care if that is his best option and I certainly think this is a reasonable option.     Linden Espinal MD  [facility]  10/11/22  15:01 EDT

## 2022-10-11 NOTE — PROGRESS NOTES
Name: Boni Vega ADMIT: 10/1/2022   : 1940  PCP: Radha Mata    MRN: [id number] LOS: 7 days   AGE/SEX: 81 y.o. male  ROOM: [facility]     Subjective   Subjective   CC: abdominal pain  No acute events. Patient has no new complaints. Abdominal pain is well-controlled. Taking PO, on modified diet. No CP/dyspnea/f/c/n/v/d.    Objective   Objective   Vital Signs  Temp:  [97.4 °F (36.3 °C)-97.9 °F (36.6 °C)] 97.4 °F (36.3 °C)  Heart Rate:  [50-82] 59  Resp:  [16-22] 22  BP: (121-161)/() 121/85  SpO2:  [91 %-98 %] 95 %  on  Flow (L/min):  [3-4.5] 4.5;   Device (Oxygen Therapy): nasal cannula  Body mass index is 24.28 kg/m².  Physical Exam  Vitals and nursing note reviewed.   Constitutional:       General: He is not in acute distress.     Appearance: He is ill-appearing. He is not toxic-appearing or diaphoretic.   HENT:      Head: Normocephalic and atraumatic.      Nose: Nose normal.      Mouth/Throat:      Mouth: Mucous membranes are moist.      Pharynx: Oropharynx is clear.   Eyes:      Conjunctiva/sclera: Conjunctivae normal.      Pupils: Pupils are equal, round, and reactive to light.   Cardiovascular:      Rate and Rhythm: Normal rate and regular rhythm.      Pulses: Normal pulses.   Pulmonary:      Effort: Pulmonary effort is normal.      Breath sounds: Examination of the right-lower field reveals decreased breath sounds. Examination of the left-lower field reveals decreased breath sounds. Decreased breath sounds present.   Abdominal:      General: Bowel sounds are normal.      Palpations: Abdomen is soft.      Tenderness: There is abdominal tenderness (mild, diffuse). There is no guarding or rebound.   Musculoskeletal:         General: No swelling or tenderness.      Cervical back: Normal range of motion and neck supple.   Skin:     General: Skin is warm and dry.      Capillary Refill: Capillary refill takes less than 2 seconds.   Neurological:      General: No focal deficit present.      Mental  Status: He is alert.   Psychiatric:         Mood and Affect: Mood normal.         Behavior: Behavior normal.         Cognition and Memory: Cognition is impaired. Memory is impaired.       Results Review     I reviewed the patient's new clinical results.  I reviewed the patient's telemetry.  Results from last 7 days   Lab Units 10/07/22  0948 10/06/22  0610 10/05/22  2353 10/05/22  1838 10/05/22  1149 10/05/22  0559 10/04/22  0739 10/04/22  0641   WBC 10*3/mm3 9.75 10.41  --   --   --  10.71  --  12.74*   HEMOGLOBIN g/dL 9.8* 8.2* 8.3* 9.1*   < > 7.9*   < > 8.8*   PLATELETS 10*3/mm3 181 135*  --   --   --  109*  --  98*    < > = values in this interval not displayed.     Results from last 7 days   Lab Units 10/06/22  0601 10/05/22  1620 10/05/22  0559 10/04/22  0641   SODIUM mmol/L 142  --  141 140   POTASSIUM mmol/L 3.7 3.9 3.3* 3.5   CHLORIDE mmol/L 112*  --  109* 107   CO2 mmol/L 23.6  --  26.0 24.4   BUN mg/dL 10  --  10 8   CREATININE mg/dL 0.85  --  0.77 0.48*   GLUCOSE mg/dL 98  --  101* 103*   EGFR mL/min/1.73 86.2  --  88.8 102.5     Results from last 7 days   Lab Units 10/06/22  0601 10/05/22  0559 10/04/22  0641   ALBUMIN g/dL 2.40* 2.40* 2.60*   BILIRUBIN mg/dL 1.0 0.9 1.2   ALK PHOS U/L 69 62 70   AST (SGOT) U/L 24 36 79*   ALT (SGPT) U/L 74* 108* 186*     Results from last 7 days   Lab Units 10/06/22  0601 10/05/22  0559 10/04/22  0641   CALCIUM mg/dL 7.8* 7.6* 7.9*   ALBUMIN g/dL 2.40* 2.40* 2.60*   PHOSPHORUS mg/dL  --  3.0 1.5*         No results found for: HGBA1C, POCGLU    No radiology results for the last day  Scheduled Medications  cetirizine, 5 mg, Oral, Daily  DULoxetine, 60 mg, Oral, Daily  ipratropium-albuterol, 3 mL, Nebulization, 4x Daily - RT  montelukast, 10 mg, Oral, Nightly  multivitamin with minerals, 1 tablet, Oral, Daily  pantoprazole, 40 mg, Oral, Q AM  sodium chloride, 10 mL, Intravenous, Q12H  tamsulosin, 0.4 mg, Oral, Nightly    Infusions   Diet  Diet Regular; GI Soft        Assessment/Plan     Active Hospital Problems    Diagnosis  POA   • **Sigmoid diverticulitis [K57.32]  Unknown   • COPD (chronic obstructive pulmonary disease) (HCC) [J44.9]  Unknown   • Benign essential tremor [G25.0]  Unknown   • 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    - CT chest showed 1.5 cm SHAR nodule in addition to a left hilar node 2.3 x1.5 cm - appreciate pulmonology recs  - biopsy considered but not in line with family goals of care, no plans for follow up on this     Essential hypertension  - blood pressure acceptable  - continue holding antihypertensives     Essential tremor  - status post deep brain stimulator     Chronic neck pain/degenerative disc disease status post low back surgery  - previously received steroid injections to the neck, pain much improved afterwards  - Continue as needed pain medication      SCDs for DVT  prophylaxis.  DNR.  Discussed with patient and nursing staff.  Anticipate discharge to SNU facility once arrangements have been made.      Linden Espinal MD  [facility]  10/10/22  20:33 EDT

## 2022-10-11 NOTE — PLAN OF CARE
Problem: Adult Inpatient Plan of Care  Goal: Plan of Care Review  Outcome: Ongoing, Progressing  Goal: Patient-Specific Goal (Individualized)  Outcome: Ongoing, Progressing  Goal: Absence of Hospital-Acquired Illness or Injury  Outcome: Ongoing, Progressing  Intervention: Prevent Skin Injury  Recent Flowsheet Documentation  Taken 10/10/2022 2000 by Faye Maldonado RN  Skin Protection:   tubing/devices free from skin contact   skin-to-skin areas padded   transparent dressing maintained   incontinence pads utilized  Intervention: Prevent and Manage VTE (Venous Thromboembolism) Risk  Recent Flowsheet Documentation  Taken 10/10/2022 2000 by Faye Maldonado RN  VTE Prevention/Management: sequential compression devices on  Range of Motion: ROM (range of motion) performed  Goal: Optimal Comfort and Wellbeing  Outcome: Ongoing, Progressing  Intervention: Monitor Pain and Promote Comfort  Recent Flowsheet Documentation  Taken 10/10/2022 2000 by Faye Maldonado RN  Pain Management Interventions:   care clustered   diversional activity provided   pillow support provided   quiet environment facilitated   position adjusted  Intervention: Provide Person-Centered Care  Recent Flowsheet Documentation  Taken 10/10/2022 2000 by Faye Maldonado RN  Trust Relationship/Rapport:   care explained   choices provided   emotional support provided  Goal: Readiness for Transition of Care  Outcome: Ongoing, Progressing     Problem: Fall Injury Risk  Goal: Absence of Fall and Fall-Related Injury  Outcome: Ongoing, Progressing     Problem: Skin Injury Risk Increased  Goal: Skin Health and Integrity  Outcome: Ongoing, Progressing  Intervention: Optimize Skin Protection  Recent Flowsheet Documentation  Taken 10/10/2022 2000 by Faye Maldonado RN  Pressure Reduction Techniques:   pressure points protected   rest period provided between sit times   weight shift assistance provided  Pressure Reduction Devices:   positioning supports  utilized   pressure-redistributing mattress utilized   heel offloading device utilized  Skin Protection:   tubing/devices free from skin contact   skin-to-skin areas padded   transparent dressing maintained   incontinence pads utilized     Problem: Infection (Intestinal Obstruction)  Goal: Absence of Infection Signs and Symptoms  Outcome: Ongoing, Progressing     Problem: Pain (Intestinal Obstruction)  Goal: Acceptable Pain Control  Outcome: Ongoing, Progressing  Intervention: Monitor and Manage Pain  Recent Flowsheet Documentation  Taken 10/10/2022 2000 by Faye Maldonado, RN  Pain Management Interventions:   care clustered   diversional activity provided   pillow support provided   quiet environment facilitated   position adjusted  Diversional Activities: television   Goal Outcome Evaluation:

## 2022-10-12 PROCEDURE — 97110 THERAPEUTIC EXERCISES: CPT

## 2022-10-12 PROCEDURE — 97530 THERAPEUTIC ACTIVITIES: CPT

## 2022-10-12 PROCEDURE — 94799 UNLISTED PULMONARY SVC/PX: CPT

## 2022-10-12 PROCEDURE — 94664 DEMO&/EVAL PT USE INHALER: CPT

## 2022-10-12 RX ADMIN — PANTOPRAZOLE SODIUM 40 MG: 40 TABLET, DELAYED RELEASE ORAL at 05:32

## 2022-10-12 RX ADMIN — MONTELUKAST SODIUM 10 MG: 10 TABLET, FILM COATED ORAL at 20:23

## 2022-10-12 RX ADMIN — MULTIPLE VITAMINS W/ MINERALS TAB 1 TABLET: TAB at 09:33

## 2022-10-12 RX ADMIN — CETIRIZINE HYDROCHLORIDE 5 MG: 10 TABLET ORAL at 09:33

## 2022-10-12 RX ADMIN — TAMSULOSIN HYDROCHLORIDE 0.4 MG: 0.4 CAPSULE ORAL at 20:23

## 2022-10-12 RX ADMIN — DULOXETINE HYDROCHLORIDE 60 MG: 60 CAPSULE, DELAYED RELEASE ORAL at 09:33

## 2022-10-12 RX ADMIN — Medication 10 ML: at 09:33

## 2022-10-12 RX ADMIN — IPRATROPIUM BROMIDE AND ALBUTEROL SULFATE 3 ML: 2.5; .5 SOLUTION RESPIRATORY (INHALATION) at 20:42

## 2022-10-12 RX ADMIN — IPRATROPIUM BROMIDE AND ALBUTEROL SULFATE 3 ML: 2.5; .5 SOLUTION RESPIRATORY (INHALATION) at 16:57

## 2022-10-12 RX ADMIN — ACETAMINOPHEN 650 MG: 325 TABLET, FILM COATED ORAL at 12:17

## 2022-10-12 RX ADMIN — IPRATROPIUM BROMIDE AND ALBUTEROL SULFATE 3 ML: 2.5; .5 SOLUTION RESPIRATORY (INHALATION) at 12:48

## 2022-10-12 RX ADMIN — Medication 10 ML: at 20:23

## 2022-10-12 RX ADMIN — IPRATROPIUM BROMIDE AND ALBUTEROL SULFATE 3 ML: 2.5; .5 SOLUTION RESPIRATORY (INHALATION) at 09:09

## 2022-10-12 NOTE — PLAN OF CARE
Goal Outcome Evaluation:  Plan of Care Reviewed With: patient        Progress: no change  Outcome Evaluation: Patient sleeping in between care. A & Ox 1. Fed meals by staff. Turned and repositioned by staff. PRN Tylenol given for headache earlier and was effective. No s/s of pain or distress noted.

## 2022-10-12 NOTE — PLAN OF CARE
Goal Outcome Evaluation:  Plan of Care Reviewed With: patient        Progress: improving  Outcome Evaluation: Pt tolerated treatment fair this date. Slightly improved progress today compared to previous visit. Required max A x2 for bed mobility and to stand 3x total. Cues given to correct balance and posture throughout, in sitting and standing positions. After returning back to supine in bed, pt mentioned his head was hurting, though no dizziness. RN notified.

## 2022-10-12 NOTE — PLAN OF CARE
Goal Outcome Evaluation:  Plan of Care Reviewed With: patient        Progress: no change  Outcome Evaluation:     Confused; Alert to self only mostly. Afib on telemetry. 2L via n/c.     Incontinent of bowel and bladder.    Q2 turning.   Accumax pump, purple wedge, and heel boots in use.   Mepilex to the left elbow and coccyx.     SCD's on.       Oral care and suction provided.         No signs of distress. Will continue to monitor.

## 2022-10-12 NOTE — THERAPY TREATMENT NOTE
Patient Name: Boni Vega  : 1940    MRN: [id number]                              Today's Date: 10/12/2022       Admit Date: 10/1/2022    Visit Dx:     ICD-10-CM ICD-9-CM   1. Diverticulitis  K57.92 562.11   2. Sepsis, due to unspecified organism, unspecified whether acute organ dysfunction present ([facility])  A41.9 038.9     995.91   3. Abnormal CT of the chest  R93.89 793.2   4. Abnormal CT of the abdomen  R93.5 793.6     Patient Active Problem List   Diagnosis   • Diverticulitis   • COPD (chronic obstructive pulmonary disease) ([facility])   • Benign essential tremor   • Status post deep brain stimulator placement   • Sigmoid diverticulitis   • Liver lesion   • History of CVA (cerebrovascular accident)   • History of aspiration pneumonia   • Essential hypertension     Past Medical History:   Diagnosis Date   • COPD (chronic obstructive pulmonary disease) ([facility])    • S/P deep brain stimulator placement     essential tremors--15 yrs ago   • Stroke ([facility])     left hand dexterity affected and aspiration issues after--15 yrs ago     Past Surgical History:   Procedure Laterality Date   • CHOLECYSTECTOMY     • HIP FRACTURE SURGERY Left     1 1/2 yrs ago---has 2 large screws in place   • KYPHOPLASTY      1 1/2 yrs ago      General Information     Row Name 10/12/22 1406          Physical Therapy Time and Intention    Document Type therapy note (daily note)  -     Mode of Treatment physical therapy  -     Row Name 10/12/22 1406          General Information    Existing Precautions/Restrictions fall;oxygen therapy device and L/min  -     Row Name 10/12/22 1406          Cognition    Orientation Status (Cognition) oriented to;person;place  -           User Key  (r) = Recorded By, (t) = Taken By, (c) = Cosigned By    Initials Name Provider Type     Connie Cedeno PTA Physical Therapist Assistant               Mobility     Row Name 10/12/22 1406          Bed Mobility    Bed Mobility supine-sit;sit-supine  -      Supine-Sit [place] (Bed Mobility) maximum assist (25% patient effort);2 person assist  -SM     Sit-Supine [place] (Bed Mobility) maximum assist (25% patient effort);2 person assist  -SM     Assistive Device (Bed Mobility) bed rails;head of bed elevated  -     Row Name 10/12/22 1406          Sit-Stand Transfer    Sit-Stand [place] (Transfers) maximum assist (25% patient effort);2 person assist  -SM     Assistive Device (Sit-Stand Transfers) --  HHA  -     Comment, (Sit-Stand Transfer) stood 3x; cues to correct posture  -           User Key  (r) = Recorded By, (t) = Taken By, (c) = Cosigned By    Initials Name Provider Type    Connie Rider PTA Physical Therapist Assistant               Obj/Interventions     Row Name 10/12/22 1411          Motor Skills    Therapeutic Exercise --  seated LAQ and marches x10 reps  -           User Key  (r) = Recorded By, (t) = Taken By, (c) = Cosigned By    Initials Name Provider Type    Connie Rider PTA Physical Therapist Assistant               Goals/Plan    No documentation.                Clinical Impression     Row Name 10/12/22 1412          Pain    Pretreatment Pain Rating 0/10 - no pain  -SM     Posttreatment Pain Rating 0/10 - no pain  -SM     Pre/Posttreatment Pain Comment pt did mention headache after lying back down  -     Row Name 10/12/22 1412          Positioning and Restraints    Pre-Treatment Position in bed  -     Post Treatment Position bed  -SM     In Bed supine;call light within reach;encouraged to call for assist;exit alarm on  -           User Key  (r) = Recorded By, (t) = Taken By, (c) = Cosigned By    Initials Name Provider Type    Connie Rider PTA Physical Therapist Assistant               Outcome Measures     Row Name 10/12/22 1414          How much help from another person do you currently need...    Turning from your back to your side while in flat bed without using bedrails? 2  -SM     Moving from  lying on back to sitting on the side of a flat bed without bedrails? 2  -SM     Moving to and from a bed to a chair (including a wheelchair)? 1  -SM     Standing up from a chair using your arms (e.g., wheelchair, bedside chair)? 2  -SM     Climbing 3-5 steps with a railing? 1  -SM     To walk in hospital room? 1  -SM     AM-PAC 6 Clicks Score (PT) 9  -SM     Highest level of mobility 3 --> Sat at edge of bed  -     Row Name 10/12/22 1414          Functional Assessment    Outcome Measure Options AM-PAC 6 Clicks Basic Mobility (PT)  -           User Key  (r) = Recorded By, (t) = Taken By, (c) = Cosigned By    Initials Name Provider Type    Connie Rider PTA Physical Therapist Assistant                             Physical Therapy Education     Title: PT OT SLP Therapies (In Progress)     Topic: Physical Therapy (In Progress)     Point: Mobility training (In Progress)     Learning Progress Summary           Patient Acceptance, E,D, NR by  at 10/12/2022 1414    Acceptance, E,D, NR by  at 10/10/2022 1349    Acceptance, E,TB,D, VU,NR by  at 10/8/2022 1308    Acceptance, E, NR,VU by AG at 10/6/2022 1659    Comment: pt is confused    Acceptance, E, NR by  at 10/6/2022 1327   Family Acceptance, E, NR,VU by AG at 10/6/2022 1659    Comment: pt is confused                   Point: Home exercise program (In Progress)     Learning Progress Summary           Patient Acceptance, E,D, NR by  at 10/12/2022 1414    Acceptance, E,D, NR by  at 10/10/2022 1349    Acceptance, E,TB,D, VU,NR by  at 10/8/2022 1308    Acceptance, E, NR,VU by AG at 10/6/2022 1659    Comment: pt is confused    Acceptance, E, NR by DB at 10/6/2022 1327   Family Acceptance, E, NR,VU by AG at 10/6/2022 1659    Comment: pt is confused                   Point: Body mechanics (In Progress)     Learning Progress Summary           Patient Acceptance, E,D, NR by  at 10/12/2022 1414    Acceptance, E,D, NR by  at 10/10/2022 5174     Acceptance, E,TB,D, VU,NR by  at 10/8/2022 1308    Acceptance, E, NR,VU by AG at 10/6/2022 1659    Comment: pt is confused    Acceptance, E, NR by DB at 10/6/2022 1327   Family Acceptance, E, NR,VU by AG at 10/6/2022 1659    Comment: pt is confused                   Point: Precautions (In Progress)     Learning Progress Summary           Patient Acceptance, E,D, NR by  at 10/12/2022 1414    Acceptance, E,D, NR by  at 10/10/2022 1349    Acceptance, E,TB,D, VU,NR by  at 10/8/2022 1308    Acceptance, E, NR,VU by AG at 10/6/2022 1659    Comment: pt is confused    Acceptance, E, NR by DB at 10/6/2022 1327   Family Acceptance, E, NR,VU by AG at 10/6/2022 1659    Comment: pt is confused                               User Key     Initials Effective Dates Name Provider Type Discipline     03/07/18 -  Connie Cedeno PTA Physical Therapist Assistant PT    DB 06/16/21 -  Jaclyn Fang PT Physical Therapist PT     06/16/21 -  Sheree Hills, RN Registered Nurse Nurse              PT Recommendation and Plan     Plan of Care Reviewed With: patient  Progress: improving  Outcome Evaluation: Pt tolerated treatment fair this date. Slightly improved progress today compared to previous visit. Required max A x2 for bed mobility and to stand 3x total. Cues given to correct balance and posture throughout, in sitting and standing positions. After returning back to supine in bed, pt mentioned his head was hurting, though no dizziness. RN notified.     Time Calculation:    PT Charges     Row Name 10/12/22 1418             Time Calculation    Start Time 1050  -      Stop Time 1115  -      Time Calculation (min) 25 min  -      PT Received On 10/12/22  -      PT - Next Appointment 10/13/22  -            User Key  (r) = Recorded By, (t) = Taken By, (c) = Cosigned By    Initials Name Provider Type     Connie Cedeno PTA Physical Therapist Assistant              Therapy Charges for Today     Code  Description Service Date Service Provider Modifiers Qty    82660274380 HC PT THER PROC EA 15 MIN 10/12/2022 Connie Cedeno, PTA GP 1    72081500803 HC PT THERAPEUTIC ACT EA 15 MIN 10/12/2022 Connie Cedeno, GRZEGORZ GP 1    09005909595 HC PT THER SUPP EA 15 MIN 10/12/2022 Connie Cedeno, GRZEGORZ GP 2          PT G-Codes  Outcome Measure Options: AM-PAC 6 Clicks Basic Mobility (PT)  AM-PAC 6 Clicks Score (PT): 9  AM-PAC 6 Clicks Score (OT): 9    Connie Cedeno PTA  10/12/2022

## 2022-10-12 NOTE — PROGRESS NOTES
Name: Boni Vega ADMIT: 10/1/2022   : 1940  PCP: Radha Mata    MRN: [id number] LOS: 9 days   AGE/SEX: 81 y.o. male  ROOM: [facility]     Subjective   Subjective   CC: abdominal pain  No acute events. Patient denies new complaints. Tolerating PO. Having ongoing cough, known issues with aspiration.    Objective   Objective   Vital Signs  Temp:  [97.3 °F (36.3 °C)-98.2 °F (36.8 °C)] 97.3 °F (36.3 °C)  Heart Rate:  [58-83] 83  Resp:  [16-18] 18  BP: (125-160)/(78-93) 125/93  SpO2:  [93 %-97 %] 95 %  on  Flow (L/min):  [2-3] 3;   Device (Oxygen Therapy): nasal cannula  Body mass index is 24.28 kg/m².  Physical Exam  Vitals and nursing note reviewed.   Constitutional:       General: He is not in acute distress.     Appearance: He is ill-appearing. He is not toxic-appearing or diaphoretic.   HENT:      Head: Normocephalic and atraumatic.      Nose: Nose normal.      Mouth/Throat:      Mouth: Mucous membranes are moist.      Pharynx: Oropharynx is clear.   Eyes:      Conjunctiva/sclera: Conjunctivae normal.      Pupils: Pupils are equal, round, and reactive to light.   Cardiovascular:      Rate and Rhythm: Normal rate and regular rhythm.      Pulses: Normal pulses.   Pulmonary:      Effort: Pulmonary effort is normal.      Breath sounds: Examination of the right-lower field reveals decreased breath sounds. Examination of the left-lower field reveals decreased breath sounds. Decreased breath sounds present.   Abdominal:      General: Bowel sounds are normal.      Palpations: Abdomen is soft.      Tenderness: There is abdominal tenderness (mild, diffuse). There is no guarding or rebound.   Musculoskeletal:         General: No swelling or tenderness.      Cervical back: Normal range of motion and neck supple.   Skin:     General: Skin is warm and dry.      Capillary Refill: Capillary refill takes less than 2 seconds.   Neurological:      General: No focal deficit present.      Mental Status: He is alert.    Psychiatric:         Mood and Affect: Mood normal.         Behavior: Behavior normal.         Cognition and Memory: Cognition is impaired. Memory is impaired.       Results Review     I reviewed the patient's new clinical results.  I reviewed the patient's telemetry.  Results from last 7 days   Lab Units 10/07/22  0948 10/06/22  0610 10/05/22  2353 10/05/22  1838   WBC 10*3/mm3 9.75 10.41  --   --    HEMOGLOBIN g/dL 9.8* 8.2* 8.3* 9.1*   PLATELETS 10*3/mm3 181 135*  --   --      Results from last 7 days   Lab Units 10/06/22  0601 10/05/22  1620   SODIUM mmol/L 142  --    POTASSIUM mmol/L 3.7 3.9   CHLORIDE mmol/L 112*  --    CO2 mmol/L 23.6  --    BUN mg/dL 10  --    CREATININE mg/dL 0.85  --    GLUCOSE mg/dL 98  --    EGFR mL/min/1.73 86.2  --      Results from last 7 days   Lab Units 10/06/22  0601   ALBUMIN g/dL 2.40*   BILIRUBIN mg/dL 1.0   ALK PHOS U/L 69   AST (SGOT) U/L 24   ALT (SGPT) U/L 74*     Results from last 7 days   Lab Units 10/06/22  0601   CALCIUM mg/dL 7.8*   ALBUMIN g/dL 2.40*         No results found for: HGBA1C, POCGLU    No radiology results for the last day  Scheduled Medications  cetirizine, 5 mg, Oral, Daily  DULoxetine, 60 mg, Oral, Daily  ipratropium-albuterol, 3 mL, Nebulization, 4x Daily - RT  montelukast, 10 mg, Oral, Nightly  multivitamin with minerals, 1 tablet, Oral, Daily  pantoprazole, 40 mg, Oral, Q AM  sodium chloride, 10 mL, Intravenous, Q12H  tamsulosin, 0.4 mg, Oral, Nightly    Infusions   Diet  Diet Regular; GI Soft       Assessment/Plan     Active Hospital Problems    Diagnosis  POA   • **Sigmoid diverticulitis [K57.32]  Unknown   • COPD (chronic obstructive pulmonary disease) (HCC) [J44.9]  Unknown   • Benign essential tremor [G25.0]  Unknown   • Status post deep brain stimulator placement [Z96.89]  Not Applicable   • Liver lesion [K76.9]  Yes   • History of CVA (cerebrovascular accident) [Z86.73]  Not Applicable   • History of aspiration pneumonia [Z87.01]  Not  Applicable   • Essential hypertension [I10]  Unknown   • Diverticulitis [K57.92]  Yes      Resolved Hospital Problems   No resolved problems to display.   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    - CT chest showed 1.5 cm SHAR nodule in addition to a left hilar node 2.3 x1.5 cm - appreciate pulmonology recs  - biopsy considered but not in line with family goals of care, no plans for follow up on this     Essential hypertension  - blood pressure acceptable  - continue holding antihypertensives     Essential tremor  - status post deep brain stimulator     Chronic neck pain/degenerative disc disease status post low back surgery  - previously received steroid injections to the neck, pain much improved afterwards  - Continue as needed pain medication      SCDs for DVT prophylaxis.  DNR.  Discussed with patient and nursing staff.  Anticipate discharge palliative care vs LTC once arrangements have been made.    Linden Espinal MD  [facility]  10/12/22  14:43 EDT

## 2022-10-13 PROCEDURE — 94760 N-INVAS EAR/PLS OXIMETRY 1: CPT

## 2022-10-13 PROCEDURE — 94664 DEMO&/EVAL PT USE INHALER: CPT

## 2022-10-13 PROCEDURE — 94761 N-INVAS EAR/PLS OXIMETRY MLT: CPT

## 2022-10-13 PROCEDURE — 94799 UNLISTED PULMONARY SVC/PX: CPT

## 2022-10-13 RX ORDER — LORAZEPAM 2 MG/ML
1 INJECTION INTRAMUSCULAR
Status: DISCONTINUED | OUTPATIENT
Start: 2022-10-13 | End: 2022-10-18 | Stop reason: HOSPADM

## 2022-10-13 RX ORDER — LORAZEPAM 2 MG/ML
2 INJECTION INTRAMUSCULAR
Status: DISCONTINUED | OUTPATIENT
Start: 2022-10-13 | End: 2022-10-18 | Stop reason: HOSPADM

## 2022-10-13 RX ORDER — KETOROLAC TROMETHAMINE 15 MG/ML
15 INJECTION, SOLUTION INTRAMUSCULAR; INTRAVENOUS EVERY 6 HOURS PRN
Status: ACTIVE | OUTPATIENT
Start: 2022-10-13 | End: 2022-10-18

## 2022-10-13 RX ORDER — PROCHLORPERAZINE EDISYLATE 5 MG/ML
5 INJECTION INTRAMUSCULAR; INTRAVENOUS EVERY 6 HOURS PRN
Status: DISCONTINUED | OUTPATIENT
Start: 2022-10-13 | End: 2022-10-18 | Stop reason: HOSPADM

## 2022-10-13 RX ORDER — GLYCOPYRROLATE 0.2 MG/ML
0.4 INJECTION INTRAMUSCULAR; INTRAVENOUS
Status: DISCONTINUED | OUTPATIENT
Start: 2022-10-13 | End: 2022-10-18 | Stop reason: HOSPADM

## 2022-10-13 RX ORDER — LORAZEPAM 2 MG/ML
2 CONCENTRATE ORAL
Status: DISCONTINUED | OUTPATIENT
Start: 2022-10-13 | End: 2022-10-18 | Stop reason: HOSPADM

## 2022-10-13 RX ORDER — LORAZEPAM 2 MG/ML
1 CONCENTRATE ORAL
Status: DISCONTINUED | OUTPATIENT
Start: 2022-10-13 | End: 2022-10-18 | Stop reason: HOSPADM

## 2022-10-13 RX ORDER — SCOLOPAMINE TRANSDERMAL SYSTEM 1 MG/1
1 PATCH, EXTENDED RELEASE TRANSDERMAL
Status: DISCONTINUED | OUTPATIENT
Start: 2022-10-13 | End: 2022-10-18 | Stop reason: HOSPADM

## 2022-10-13 RX ORDER — MORPHINE SULFATE 20 MG/ML
20 SOLUTION ORAL
Status: DISCONTINUED | OUTPATIENT
Start: 2022-10-13 | End: 2022-10-18 | Stop reason: HOSPADM

## 2022-10-13 RX ORDER — GLYCOPYRROLATE 0.2 MG/ML
0.2 INJECTION INTRAMUSCULAR; INTRAVENOUS
Status: DISCONTINUED | OUTPATIENT
Start: 2022-10-13 | End: 2022-10-18 | Stop reason: HOSPADM

## 2022-10-13 RX ORDER — DIPHENHYDRAMINE HYDROCHLORIDE AND LIDOCAINE HYDROCHLORIDE AND ALUMINUM HYDROXIDE AND MAGNESIUM HYDRO
5 KIT EVERY 4 HOURS PRN
Status: DISCONTINUED | OUTPATIENT
Start: 2022-10-13 | End: 2022-10-18 | Stop reason: HOSPADM

## 2022-10-13 RX ORDER — LORAZEPAM 2 MG/ML
0.5 INJECTION INTRAMUSCULAR
Status: DISCONTINUED | OUTPATIENT
Start: 2022-10-13 | End: 2022-10-18 | Stop reason: HOSPADM

## 2022-10-13 RX ORDER — LORAZEPAM 2 MG/ML
0.5 CONCENTRATE ORAL
Status: DISCONTINUED | OUTPATIENT
Start: 2022-10-13 | End: 2022-10-18 | Stop reason: HOSPADM

## 2022-10-13 RX ORDER — MORPHINE SULFATE 2 MG/ML
2 INJECTION, SOLUTION INTRAMUSCULAR; INTRAVENOUS
Status: ACTIVE | OUTPATIENT
Start: 2022-10-13 | End: 2022-10-18

## 2022-10-13 RX ORDER — ACETAMINOPHEN 160 MG/5ML
650 SOLUTION ORAL EVERY 4 HOURS PRN
Status: DISCONTINUED | OUTPATIENT
Start: 2022-10-13 | End: 2022-10-18 | Stop reason: HOSPADM

## 2022-10-13 RX ORDER — DIPHENOXYLATE HYDROCHLORIDE AND ATROPINE SULFATE 2.5; .025 MG/1; MG/1
1 TABLET ORAL
Status: DISCONTINUED | OUTPATIENT
Start: 2022-10-13 | End: 2022-10-18 | Stop reason: HOSPADM

## 2022-10-13 RX ORDER — MORPHINE SULFATE 10 MG/ML
6 INJECTION INTRAMUSCULAR; INTRAVENOUS; SUBCUTANEOUS
Status: DISCONTINUED | OUTPATIENT
Start: 2022-10-13 | End: 2022-10-18 | Stop reason: HOSPADM

## 2022-10-13 RX ORDER — MORPHINE SULFATE 20 MG/ML
10 SOLUTION ORAL
Status: DISCONTINUED | OUTPATIENT
Start: 2022-10-13 | End: 2022-10-18 | Stop reason: HOSPADM

## 2022-10-13 RX ORDER — PROCHLORPERAZINE MALEATE 10 MG
5 TABLET ORAL EVERY 6 HOURS PRN
Status: DISCONTINUED | OUTPATIENT
Start: 2022-10-13 | End: 2022-10-18 | Stop reason: HOSPADM

## 2022-10-13 RX ORDER — ECHINACEA PURPUREA EXTRACT 125 MG
2 TABLET ORAL AS NEEDED
Status: DISCONTINUED | OUTPATIENT
Start: 2022-10-13 | End: 2022-10-18 | Stop reason: HOSPADM

## 2022-10-13 RX ORDER — HYDROMORPHONE HCL 110MG/55ML
1.5 PATIENT CONTROLLED ANALGESIA SYRINGE INTRAVENOUS
Status: DISCONTINUED | OUTPATIENT
Start: 2022-10-13 | End: 2022-10-18 | Stop reason: HOSPADM

## 2022-10-13 RX ORDER — ACETAMINOPHEN 650 MG/1
650 SUPPOSITORY RECTAL EVERY 4 HOURS PRN
Status: DISCONTINUED | OUTPATIENT
Start: 2022-10-13 | End: 2022-10-18 | Stop reason: HOSPADM

## 2022-10-13 RX ORDER — MORPHINE SULFATE 20 MG/ML
5 SOLUTION ORAL
Status: DISPENSED | OUTPATIENT
Start: 2022-10-13 | End: 2022-10-18

## 2022-10-13 RX ORDER — ATROPINE SULFATE 10 MG/ML
2 SOLUTION/ DROPS OPHTHALMIC 2 TIMES DAILY PRN
Status: DISCONTINUED | OUTPATIENT
Start: 2022-10-13 | End: 2022-10-18 | Stop reason: HOSPADM

## 2022-10-13 RX ORDER — HYDROMORPHONE HYDROCHLORIDE 1 MG/ML
0.5 INJECTION, SOLUTION INTRAMUSCULAR; INTRAVENOUS; SUBCUTANEOUS
Status: DISPENSED | OUTPATIENT
Start: 2022-10-13 | End: 2022-10-18

## 2022-10-13 RX ORDER — ACETAMINOPHEN 325 MG/1
650 TABLET ORAL EVERY 4 HOURS PRN
Status: DISCONTINUED | OUTPATIENT
Start: 2022-10-13 | End: 2022-10-18 | Stop reason: HOSPADM

## 2022-10-13 RX ORDER — IPRATROPIUM BROMIDE AND ALBUTEROL SULFATE 2.5; .5 MG/3ML; MG/3ML
3 SOLUTION RESPIRATORY (INHALATION) EVERY 6 HOURS PRN
Status: DISCONTINUED | OUTPATIENT
Start: 2022-10-13 | End: 2022-10-18 | Stop reason: HOSPADM

## 2022-10-13 RX ORDER — MORPHINE SULFATE 4 MG/ML
4 INJECTION, SOLUTION INTRAMUSCULAR; INTRAVENOUS
Status: DISCONTINUED | OUTPATIENT
Start: 2022-10-13 | End: 2022-10-18 | Stop reason: HOSPADM

## 2022-10-13 RX ORDER — PROCHLORPERAZINE 25 MG
25 SUPPOSITORY, RECTAL RECTAL EVERY 12 HOURS PRN
Status: DISCONTINUED | OUTPATIENT
Start: 2022-10-13 | End: 2022-10-18 | Stop reason: HOSPADM

## 2022-10-13 RX ADMIN — Medication 10 ML: at 08:27

## 2022-10-13 RX ADMIN — PANTOPRAZOLE SODIUM 40 MG: 40 TABLET, DELAYED RELEASE ORAL at 05:26

## 2022-10-13 RX ADMIN — TAMSULOSIN HYDROCHLORIDE 0.4 MG: 0.4 CAPSULE ORAL at 23:07

## 2022-10-13 RX ADMIN — MONTELUKAST SODIUM 10 MG: 10 TABLET, FILM COATED ORAL at 23:07

## 2022-10-13 RX ADMIN — DULOXETINE HYDROCHLORIDE 60 MG: 60 CAPSULE, DELAYED RELEASE ORAL at 08:26

## 2022-10-13 RX ADMIN — IPRATROPIUM BROMIDE AND ALBUTEROL SULFATE 3 ML: 2.5; .5 SOLUTION RESPIRATORY (INHALATION) at 07:21

## 2022-10-13 RX ADMIN — MULTIPLE VITAMINS W/ MINERALS TAB 1 TABLET: TAB at 08:26

## 2022-10-13 RX ADMIN — IPRATROPIUM BROMIDE AND ALBUTEROL SULFATE 3 ML: 2.5; .5 SOLUTION RESPIRATORY (INHALATION) at 15:42

## 2022-10-13 RX ADMIN — IPRATROPIUM BROMIDE AND ALBUTEROL SULFATE 3 ML: 2.5; .5 SOLUTION RESPIRATORY (INHALATION) at 11:50

## 2022-10-13 RX ADMIN — CETIRIZINE HYDROCHLORIDE 5 MG: 10 TABLET ORAL at 08:26

## 2022-10-13 NOTE — CASE MANAGEMENT/SOCIAL WORK
Continued Stay Note  [facility]     Patient Name: Boni Vega  MRN: [id number]  Today's Date: 10/13/2022    Admit Date: 10/1/2022    Plan: Inpt Hospice vs home with Hospice   Discharge Plan     Row Name 10/13/22 1236       Plan    Plan Inpt Hospice vs home with Hospice    Patient/Family in Agreement with Plan yes    Plan Comments Late entry 10/12/22 Spoke to [facility] they cannot accept patient due to probable need for LTC. Spoke with pt dtr Leanna and she asked for Palliative to contact her and hospice consult. Orders entered and spoke with Mei/Ruben. Dtr is interested in inpt hospice care vs home with hospice. CCP will follow for evolving needs. -Connie CHILDRESS               Discharge Codes    No documentation.               Expected Discharge Date and Time     Expected Discharge Date Expected Discharge Time    Oct 13, 2022             Connie Dickey RN

## 2022-10-13 NOTE — PROGRESS NOTES
Received request from [facility] to follow up with patient family as family is interested in palliative/hospice care. PC team spoke with family last week. Hospice consult placed and scheduled to meet with family at 730pm today.     Spoke to dgtr via phone. She confirmed her goal is comfort care at this time and wants to take patient home with hospice. Dgtr prefers to transition patient to 4P palliative unit to bridge home to ensure symptoms are managed and a smoother transition home. She was agreeable to d/cing non-essential medications but wants to continue his allergy meds as she noted that he has severe seasonal allergies that can be uncomfortable for him. She would like comfort medications to be given if patient is showing s/s of distress. Dgtr did make note that patient's neck causes him the most pain but he receives a steroid shot every 3 months from home health RN and that manages his pain. She wants to make sure that he has a good medication regimen prior to d/cing home. She confirmed she is meeting with hospice this evening as well. Spoke to Dr. Espinal, KENIA Rosales, and RN Luz Elena. Dgtr would like to be notified with room number and when patient is transitioning to 4.

## 2022-10-13 NOTE — SIGNIFICANT NOTE
10/13/22 1345   OTHER   Discipline physical therapy assistant   Rehab Time/Intention   Session Not Performed other (see comments)  (pt transferring to palliative unit; PT will f/u tomorrow if appropriate)   Recommendation   PT - Next Appointment 10/14/22

## 2022-10-13 NOTE — PLAN OF CARE
Goal Outcome Evaluation:  Plan of Care Reviewed With: patient        Progress: no change  Outcome Evaluation: Patient sleeping in between care. Patient to transfer to [facility], report called. No s/s of pain or distress noted.

## 2022-10-13 NOTE — PROGRESS NOTES
Name: Boni Vega ADMIT: 10/1/2022   : 1940  PCP: Radha Mata    MRN: [id number] LOS: 10 days   AGE/SEX: 82 y.o. male  ROOM: [facility]     Subjective   Subjective   CC: abdominal pain  No acute events. Patient denies new complaints. Abdominal pain is stable. Tolerating PO.    Objective   Objective   Vital Signs  Temp:  [97.3 °F (36.3 °C)-98.3 °F (36.8 °C)] 97.5 °F (36.4 °C)  Heart Rate:  [63-83] 74  Resp:  [16-18] 18  BP: (125-151)/(73-93) 140/92  SpO2:  [91 %-100 %] 96 %  on  Flow (L/min):  [2-3] 2;   Device (Oxygen Therapy): nasal cannula  Body mass index is 24.28 kg/m².  Physical Exam  Vitals and nursing note reviewed.   Constitutional:       General: He is not in acute distress.     Appearance: He is ill-appearing. He is not toxic-appearing or diaphoretic.   HENT:      Head: Normocephalic and atraumatic.      Nose: Nose normal.      Mouth/Throat:      Mouth: Mucous membranes are moist.      Pharynx: Oropharynx is clear.   Eyes:      Conjunctiva/sclera: Conjunctivae normal.      Pupils: Pupils are equal, round, and reactive to light.   Cardiovascular:      Rate and Rhythm: Normal rate and regular rhythm.      Pulses: Normal pulses.   Pulmonary:      Effort: Pulmonary effort is normal.      Breath sounds: Examination of the right-lower field reveals decreased breath sounds. Examination of the left-lower field reveals decreased breath sounds. Decreased breath sounds present.   Abdominal:      General: Bowel sounds are normal.      Palpations: Abdomen is soft.      Tenderness: There is abdominal tenderness (mild, diffuse). There is no guarding or rebound.   Musculoskeletal:         General: No swelling or tenderness.      Cervical back: Normal range of motion and neck supple.   Skin:     General: Skin is warm and dry.      Capillary Refill: Capillary refill takes less than 2 seconds.   Neurological:      General: No focal deficit present.      Mental Status: He is alert.   Psychiatric:         Mood  and Affect: Mood normal.         Behavior: Behavior normal.         Cognition and Memory: Cognition is impaired. Memory is impaired.       Results Review     I reviewed the patient's new clinical results.  I reviewed the patient's telemetry.  Results from last 7 days   Lab Units 10/07/22  0948   WBC 10*3/mm3 9.75   HEMOGLOBIN g/dL 9.8*   PLATELETS 10*3/mm3 181                     No results found for: HGBA1C, POCGLU    No radiology results for the last day  Scheduled Medications  cetirizine, 5 mg, Oral, Daily  DULoxetine, 60 mg, Oral, Daily  ipratropium-albuterol, 3 mL, Nebulization, 4x Daily - RT  montelukast, 10 mg, Oral, Nightly  multivitamin with minerals, 1 tablet, Oral, Daily  pantoprazole, 40 mg, Oral, Q AM  sodium chloride, 10 mL, Intravenous, Q12H  tamsulosin, 0.4 mg, Oral, Nightly    Infusions   Diet  Diet Regular; GI Soft       Assessment/Plan     Active Hospital Problems    Diagnosis  POA   • **Sigmoid diverticulitis [K57.32]  Yes   • COPD (chronic obstructive pulmonary disease) (HCC) [J44.9]  Yes   • Benign essential tremor [G25.0]  Yes   • 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   Acute Sigmoid diverticulitis with Sepsis, present on admission  - WBC was 20.61 and Lactic acid 2.5 on admission  - blood cx's negative  - finished zosyn     Liver lesion  - likely malignant  - had evidence of bleeding which has since resolved  - no plans for biopsy  - appreciate general surgery and GI recs     Mild hypoxemia/COPD  - CT angiogram of the chest showed bibasilar consolidation likely atelectasis vs pneumonia, treated with abx (given for diverticulitis)  - encourage pulmonary toilet as  able     History of CVA  - has residual oropharyngeal dysphagia and aspiration  - appreciate SLP recs, modified diet recommended but on thin liquids per family request    Left upper lobe pulmonary nodule, as well as enlarged left hilar lymph node.    - CT chest showed 1.5 cm SHAR nodule in addition to a left hilar node 2.3 x1.5 cm - appreciate pulmonology recs  - biopsy considered but not in line with family goals of care, no plans for follow up on this     Essential hypertension  - blood pressure acceptable  - continue holding antihypertensives     Essential tremor  - status post deep brain stimulator     Chronic neck pain/degenerative disc disease status post low back surgery  - previously received steroid injections to the neck, pain much improved afterwards  - Continue as needed pain medication      SCDs for DVT prophylaxis.  DNR.  Discussed with patient, nursing staff and consulting provider.  Disposition to be determined.    Palliative care has met again with the patient and his daughter and they desire to pursue comfort measures only. Will transfer to the palliative care unit. Will stop all testing and treatments which are not related to comfort. We will monitor on the palliative care unit and if his care requirements allow will plan to discharge home with [facility].    Linden Espinal MD  [facility]  10/13/22  12:24 EDT

## 2022-10-14 RX ADMIN — CETIRIZINE HYDROCHLORIDE 5 MG: 10 TABLET ORAL at 08:54

## 2022-10-14 RX ADMIN — MORPHINE SULFATE 5 MG: 20 SOLUTION ORAL at 16:12

## 2022-10-14 RX ADMIN — TAMSULOSIN HYDROCHLORIDE 0.4 MG: 0.4 CAPSULE ORAL at 21:36

## 2022-10-14 RX ADMIN — MONTELUKAST SODIUM 10 MG: 10 TABLET, FILM COATED ORAL at 21:36

## 2022-10-14 RX ADMIN — DULOXETINE HYDROCHLORIDE 60 MG: 60 CAPSULE, DELAYED RELEASE ORAL at 08:54

## 2022-10-14 RX ADMIN — PANTOPRAZOLE SODIUM 40 MG: 40 TABLET, DELAYED RELEASE ORAL at 08:54

## 2022-10-14 RX ADMIN — ACETAMINOPHEN 650 MG: 325 TABLET, FILM COATED ORAL at 10:04

## 2022-10-14 NOTE — PROGRESS NOTES
Name: Boni Vega ADMIT: 10/1/2022   : 1940  PCP: Radha Mata    MRN: [id number] LOS: 11 days   AGE/SEX: 82 y.o. male  ROOM: 91/1     Subjective   Subjective   CC: abdominal pain  No acute events. Moved to palliative care yesterday. Patient complains of headache and neck pain.    Objective   Objective   Vital Signs  Temp:  [97.6 °F (36.4 °C)-97.8 °F (36.6 °C)] 97.6 °F (36.4 °C)  Heart Rate:  [78-79] 79  Resp:  [18] 18  BP: (144-146)/(80-83) 144/80  SpO2:  [91 %-93 %] 91 %  on  Flow (L/min):  [2] 2;   Device (Oxygen Therapy): nasal cannula  Body mass index is 24.28 kg/m².  Physical Exam  Vitals and nursing note reviewed.   Constitutional:       General: He is not in acute distress.     Appearance: He is ill-appearing. He is not toxic-appearing or diaphoretic.   HENT:      Head: Normocephalic and atraumatic.      Nose: Nose normal.      Mouth/Throat:      Mouth: Mucous membranes are moist.      Pharynx: Oropharynx is clear.   Eyes:      Conjunctiva/sclera: Conjunctivae normal.      Pupils: Pupils are equal, round, and reactive to light.   Cardiovascular:      Rate and Rhythm: Normal rate and regular rhythm.      Pulses: Normal pulses.   Pulmonary:      Effort: Pulmonary effort is normal.      Breath sounds: Examination of the right-lower field reveals decreased breath sounds. Examination of the left-lower field reveals decreased breath sounds. Decreased breath sounds present.   Abdominal:      General: Bowel sounds are normal.      Palpations: Abdomen is soft.      Tenderness: There is abdominal tenderness (mild, diffuse). There is no guarding or rebound.   Musculoskeletal:         General: No swelling or tenderness.      Cervical back: Normal range of motion and neck supple.   Skin:     General: Skin is warm and dry.      Capillary Refill: Capillary refill takes less than 2 seconds.   Neurological:      General: No focal deficit present.      Mental Status: He is alert.   Psychiatric:          Mood and Affect: Mood normal.         Behavior: Behavior normal.         Cognition and Memory: Cognition is impaired. Memory is impaired.       Results Review     I reviewed the patient's new clinical results.                        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     Continue comfort measures. Will follow clinical course and make plans for disposition accordingly.    Discussed with patient and nursing staff.  Disposition to be determined.      Linden Espinal MD  [facility]  10/14/22  15:42 EDT

## 2022-10-14 NOTE — PROGRESS NOTES
Discharge Planning Assessment  [facility]     Patient Name: Boni Vega  MRN: [id number]  Today's Date: 10/14/2022    Admit Date: 10/1/2022    Plan: Inpt Hospice vs home with Hospice   Discharge Needs Assessment    No documentation.                Discharge Plan     Row Name 10/14/22 0929       Plan    Plan Comments The patient was transferred to [facility] from [address] [place] [address] on 10/13/22. The patient is palliative. [facility] is following. SYDNIE Rendon RN [facility]              Continued Care and Services - Admitted Since 10/1/2022     Destination     Service Provider Request Status Selected Services Address Phone Fax Patient Preferred    [facility] Pending - Request Sent N/A [address] [facility] [address] [phone] --    [facility] Pending - Request Sent N/A [address] [facility] [address] [phone] --    [facility] Declined  Out of network N/A [address] [facility] [address] [phone] --              Expected Discharge Date and Time     Expected Discharge Date Expected Discharge Time    Oct 16, 2022          Demographic Summary    No documentation.                Functional Status    No documentation.                Psychosocial    No documentation.                Abuse/Neglect    No documentation.                Legal    No documentation.                Substance Abuse    No documentation.                Patient Forms    No documentation.                   Terra Rendon, RN

## 2022-10-14 NOTE — PROGRESS NOTES
Discharge Planning Assessment  [facility]     Patient Name: Boni Vega  MRN: [id number]  Today's Date: 10/14/2022    Admit Date: 10/1/2022    Plan: Inpt Hospice vs home with Hospice   Discharge Needs Assessment    No documentation.                Discharge Plan     Row Name 10/14/22 1259       Plan    Plan Comments Spoke with Leanna/daughter and answered the questions she had and gave her my information on how to reach me if she has any other questions. SYDNIE Rendon RN, CCP.              Continued Care and Services - Admitted Since 10/1/2022     Destination     Service Provider Request Status Selected Services Address Phone Fax Patient Preferred    Count includes the [facility] Pending - Request Sent N/A [address] [facility] [address] [phone] --    [facility] Pending - Request Sent N/A [address] [facility] [address] [address] [phone] --    [facility] Declined  Out of network N/A [address] [facility] [address] [phone] --              Expected Discharge Date and Time     Expected Discharge Date Expected Discharge Time    Oct 16, 2022          Demographic Summary    No documentation.                Functional Status    No documentation.                Psychosocial    No documentation.                Abuse/Neglect    No documentation.                Legal    No documentation.                Substance Abuse    No documentation.                Patient Forms    No documentation.                   Terra Rendon, ZAINAB

## 2022-10-14 NOTE — CONSULTS
"Visit with patient. When I asked patient if I could pray he responded \"Yes.  I prayed for comfort and peace of patient and his family.   "

## 2022-10-14 NOTE — PLAN OF CARE
Goal Outcome Evaluation:  Plan of Care Reviewed With: patient        Progress: no change  Outcome Evaluation: Pt awake, alert most of day, watching tv.  Medicated prn for head and neck pain.  Speech garbled and hard to understand.  Incontinent stool/urine. Texture of food changed due to no teeth and having a hard time chewing food.   Plan is home with hospitals.

## 2022-10-14 NOTE — CONSULTS
Met with patient dgtr and son in law and provided explanation of services. Goal is comfort. Plan is home with hospice at discharge. Patient owns hospital bed but will need new mattress. [facility] to order at discharge.   Per dgtr patient has trouble swallowing and is lactose intolerant. He asks for ford every day but is unable to swallow it. She also reports that patient does not do well laying flat. She also reports that patient is a mouth breather and can sometimes look like he is SOA. Patient comp[laint of neck pain and dgtr placed cloth around back of neck and this seemed to help.     Thank you for the referral. If plans made or any questions please let us know. [facility] to follow up.     Luna Valdez RN  [facility]  [phone]

## 2022-10-15 RX ADMIN — DULOXETINE HYDROCHLORIDE 60 MG: 60 CAPSULE, DELAYED RELEASE ORAL at 09:10

## 2022-10-15 RX ADMIN — Medication 10 ML: at 09:10

## 2022-10-15 RX ADMIN — CETIRIZINE HYDROCHLORIDE 5 MG: 10 TABLET ORAL at 09:10

## 2022-10-15 RX ADMIN — ACETAMINOPHEN 650 MG: 325 SUSPENSION ORAL at 14:23

## 2022-10-15 RX ADMIN — PANTOPRAZOLE SODIUM 40 MG: 40 TABLET, DELAYED RELEASE ORAL at 09:10

## 2022-10-15 NOTE — PLAN OF CARE
Problem: Adult Inpatient Plan of Care  Goal: Plan of Care Review  Outcome: Ongoing, Progressing  Flowsheets (Taken 10/15/2022 0418)  Progress: no change  Plan of Care Reviewed With: patient  Outcome Evaluation: Maintained comfort measures per palliative care protocol. No PRN meds needed this shift. Scheduled meds given crushed in apple sauce. Some coughing noted after po meds was given. Patient slept between care. Plan is to go home with hospice. Will continue to monitor vital signs and comfort.   Goal Outcome Evaluation:  Plan of Care Reviewed With: patient        Progress: no change  Outcome Evaluation: Maintained comfort measures per palliative care protocol. No PRN meds needed this shift. Scheduled meds given crushed in apple sauce. Some coughing noted after po meds was given. Patient slept between care. Plan is to go home with hospice. Will continue to monitor vital signs and comfort.

## 2022-10-15 NOTE — PROGRESS NOTES
Name: Boni Vega ADMIT: 10/1/2022   : 1940  PCP: Radha Mata    MRN: [id number] LOS: 12 days   AGE/SEX: 82 y.o. male  ROOM: 91/1     Subjective   Subjective   CC: abdominal pain  No acute events. Patient has no new complaints. Pain is controlled.    Objective   Objective   Vital Signs  Temp:  [97.2 °F (36.2 °C)-97.7 °F (36.5 °C)] 97.2 °F (36.2 °C)  Heart Rate:  [74] 74  Resp:  [16-18] 16  BP: (102-136)/(65-81) 102/65  SpO2:  [92 %-93 %] 93 %  on  Flow (L/min):  [2-3] 3;   Device (Oxygen Therapy): nasal cannula  Body mass index is 24.28 kg/m².  Physical Exam  Vitals and nursing note reviewed.   Constitutional:       General: He is not in acute distress.     Appearance: He is ill-appearing. He is not toxic-appearing or diaphoretic.   HENT:      Head: Normocephalic and atraumatic.      Nose: Nose normal.      Mouth/Throat:      Mouth: Mucous membranes are moist.      Pharynx: Oropharynx is clear.   Eyes:      Conjunctiva/sclera: Conjunctivae normal.      Pupils: Pupils are equal, round, and reactive to light.   Cardiovascular:      Rate and Rhythm: Normal rate and regular rhythm.      Pulses: Normal pulses.   Pulmonary:      Effort: Pulmonary effort is normal.      Breath sounds: Examination of the right-lower field reveals decreased breath sounds. Examination of the left-lower field reveals decreased breath sounds. Decreased breath sounds present.   Abdominal:      General: Bowel sounds are normal.      Palpations: Abdomen is soft.      Tenderness: There is abdominal tenderness (mild, diffuse). There is no guarding or rebound.   Musculoskeletal:         General: No swelling or tenderness.      Cervical back: Normal range of motion and neck supple.   Skin:     General: Skin is warm and dry.      Capillary Refill: Capillary refill takes less than 2 seconds.   Neurological:      General: No focal deficit present.      Mental Status: He is alert.   Psychiatric:         Mood and Affect: Mood normal.          Behavior: Behavior normal.         Cognition and Memory: Cognition is impaired. Memory is impaired.       Results Review     I reviewed the patient's new clinical results.                        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     Continue comfort measures. Will follow clinical course and make plans for disposition accordingly.    Discussed with patient and nursing staff.  Disposition to be determined.      Linden Espinal MD  [facility]  10/15/22  16:46 EDT

## 2022-10-16 PROCEDURE — 25010000002 HYDROMORPHONE PER 4 MG: Performed by: INTERNAL MEDICINE

## 2022-10-16 RX ADMIN — HYDROMORPHONE HYDROCHLORIDE 0.5 MG: 1 INJECTION, SOLUTION INTRAMUSCULAR; INTRAVENOUS; SUBCUTANEOUS at 05:18

## 2022-10-16 RX ADMIN — Medication 10 ML: at 08:35

## 2022-10-16 RX ADMIN — CETIRIZINE HYDROCHLORIDE 5 MG: 10 TABLET ORAL at 08:32

## 2022-10-16 RX ADMIN — MORPHINE SULFATE 10 MG: 20 SOLUTION ORAL at 13:41

## 2022-10-16 RX ADMIN — DULOXETINE HYDROCHLORIDE 60 MG: 60 CAPSULE, DELAYED RELEASE ORAL at 08:32

## 2022-10-16 NOTE — PLAN OF CARE
Problem: Adult Inpatient Plan of Care  Goal: Plan of Care Review  Outcome: Ongoing, Progressing  Flowsheets (Taken 10/16/2022 0712)  Progress: no change  Plan of Care Reviewed With: patient  Outcome Evaluation: Maintained comfort measures per palliative care protocol. Patient has difficulty swallowing and would cough for a long time after taking po by mouth. Patient rates pain 8/10. PRN Dilaudid 0.5mg given with very good result. No family at bedside. Will continue to monitor vital signs and comfort.   Goal Outcome Evaluation:  Plan of Care Reviewed With: patient        Progress: no change  Outcome Evaluation: Maintained comfort measures per palliative care protocol. Patient has difficulty swallowing and would cough for a long time after taking po by mouth. Patient rates pain 8/10. PRN Dilaudid 0.5mg given with very good result. No family at bedside. Will continue to monitor vital signs and comfort.   home

## 2022-10-16 NOTE — PLAN OF CARE
Goal Outcome Evaluation:              Outcome Evaluation: Patient with significant coughing post liquid/food ingestion at times throughout shift.  Patient alert and orientated, pleasant and cooperative.  Given options for taking oral medications crushed in applesauce, patient chose to take them.  Rated pain to head/neck area '7' in the afternoon.  States does not have an allergy to morphine.  Offered tylenol verses liquid morphine, patient requested morphine.  Given with no adverse affects and relief pain.

## 2022-10-16 NOTE — PROGRESS NOTES
Name: Boni Vega ADMIT: 10/1/2022   : 1940  PCP: Radha Mata    MRN: [id number] LOS: 13 days   AGE/SEX: 82 y.o. male  ROOM: [facility]/     Subjective   Subjective   CC: abdominal pain  No acute events. Patient has no new complaints. Pain is controlled. Taking PO. Received IV dilaudid early this AM x1.    Objective   Objective   Vital Signs  Temp:  [98.8 °F (37.1 °C)-98.9 °F (37.2 °C)] 98.9 °F (37.2 °C)  Heart Rate:  [71-75] 75  Resp:  [16-18] 16  BP: (137)/(71-89) 137/89  SpO2:  [98 %] 98 %  on  Flow (L/min):  [3] 3;   Device (Oxygen Therapy): nasal cannula  Body mass index is 24.28 kg/m².  Physical Exam  Vitals and nursing note reviewed.   Constitutional:       General: He is not in acute distress.     Appearance: He is ill-appearing. He is not toxic-appearing or diaphoretic.   HENT:      Head: Normocephalic and atraumatic.      Nose: Nose normal.      Mouth/Throat:      Mouth: Mucous membranes are moist.      Pharynx: Oropharynx is clear.   Eyes:      Conjunctiva/sclera: Conjunctivae normal.      Pupils: Pupils are equal, round, and reactive to light.   Cardiovascular:      Rate and Rhythm: Normal rate and regular rhythm.      Pulses: Normal pulses.   Pulmonary:      Effort: Pulmonary effort is normal.      Breath sounds: Examination of the right-lower field reveals decreased breath sounds. Examination of the left-lower field reveals decreased breath sounds. Decreased breath sounds present.   Abdominal:      General: Bowel sounds are normal.      Palpations: Abdomen is soft.      Tenderness: There is abdominal tenderness (mild, diffuse). There is no guarding or rebound.   Musculoskeletal:         General: No swelling or tenderness.      Cervical back: Normal range of motion and neck supple.   Skin:     General: Skin is warm and dry.      Capillary Refill: Capillary refill takes less than 2 seconds.   Neurological:      General: No focal deficit present.      Mental Status: He is alert.    Psychiatric:         Mood and Affect: Mood normal.         Behavior: Behavior normal.         Cognition and Memory: Cognition is impaired. Memory is impaired.       Results Review     I reviewed the patient's new clinical results.                        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     Continue comfort measures. [facility] following. Probably can plan for home with Hospice at this point.    Discussed with patient and nursing staff.  Disposition to be determined.      Linden Espinal MD  [facility]  10/16/22  13:29 EDT

## 2022-10-17 PROCEDURE — 25010000002 HYDROMORPHONE PER 4 MG: Performed by: INTERNAL MEDICINE

## 2022-10-17 RX ADMIN — DULOXETINE HYDROCHLORIDE 60 MG: 60 CAPSULE, DELAYED RELEASE ORAL at 08:32

## 2022-10-17 RX ADMIN — TAMSULOSIN HYDROCHLORIDE 0.4 MG: 0.4 CAPSULE ORAL at 21:35

## 2022-10-17 RX ADMIN — CETIRIZINE HYDROCHLORIDE 5 MG: 10 TABLET ORAL at 08:32

## 2022-10-17 RX ADMIN — MONTELUKAST SODIUM 10 MG: 10 TABLET, FILM COATED ORAL at 21:34

## 2022-10-17 RX ADMIN — HYDROMORPHONE HYDROCHLORIDE 0.5 MG: 1 INJECTION, SOLUTION INTRAMUSCULAR; INTRAVENOUS; SUBCUTANEOUS at 02:04

## 2022-10-17 RX ADMIN — ACETAMINOPHEN 650 MG: 325 TABLET, FILM COATED ORAL at 18:49

## 2022-10-17 RX ADMIN — MICONAZOLE NITRATE 1 APPLICATION: 2 POWDER TOPICAL at 21:37

## 2022-10-17 NOTE — PLAN OF CARE
Problem: Adult Inpatient Plan of Care  Goal: Plan of Care Review  Outcome: Ongoing, Progressing  Flowsheets (Taken 10/17/2022 0534)  Progress: no change  Plan of Care Reviewed With: patient  Outcome Evaluation: Maintained comfort measures per palliative care protocol. Patient was medicated x1 for pain 9/10. No family at bedside. Patient is incontinent of urine. Bed alarm on. Will continue to monitor vital signs and comfort.   Goal Outcome Evaluation:  Plan of Care Reviewed With: patient        Progress: no change  Outcome Evaluation: Maintained comfort measures per palliative care protocol. Patient was medicated x1 for pain 9/10. No family at bedside. Patient is incontinent of urine. Bed alarm on. Will continue to monitor vital signs and comfort.

## 2022-10-17 NOTE — PLAN OF CARE
Goal Outcome Evaluation:  Plan of Care Reviewed With: patient        Progress: declining  Outcome Evaluation: Patient appears more tired/sleepy today.  Ate some from trays.  Coughs a lot with any food or fluid.  Red tiny bumps noted inside legs/groin area, and med ordered for.  Incontinent urine.  Watching tv.  Denies pain.   Medicated for headache at end of shift.  Daughter here and requested Tylenol (no dilaudid) for the headache.   She would like CCP to contact her for discharge plans and is ready for him to come home with \[facility]\"".

## 2022-10-17 NOTE — PROGRESS NOTES
Name: Boni Vega ADMIT: 10/1/2022   : 1940  PCP: Radha Mata    MRN: [id number] LOS: 14 days   AGE/SEX: 82 y.o. male  ROOM: [facility]/     Subjective   Subjective   CC: abdominal pain  No acute events. Patient has no new complaints. Pain is controlled. Taking PO. Received IV dilaudid x1 early this AM.    Objective   Objective   Vital Signs  Temp:  [97.6 °F (36.4 °C)-98.3 °F (36.8 °C)] 98.3 °F (36.8 °C)  Heart Rate:  [77] 77  Resp:  [20] 20  BP: (139-146)/(80-88) 146/88  SpO2:  [91 %-95 %] 95 %  on  Flow (L/min):  [2-3] 2;   Device (Oxygen Therapy): nasal cannula  Body mass index is 24.28 kg/m².  Physical Exam  Vitals and nursing note reviewed.   Constitutional:       General: He is not in acute distress.     Appearance: He is ill-appearing. He is not toxic-appearing or diaphoretic.   HENT:      Head: Normocephalic and atraumatic.      Nose: Nose normal.      Mouth/Throat:      Mouth: Mucous membranes are moist.      Pharynx: Oropharynx is clear.   Eyes:      Conjunctiva/sclera: Conjunctivae normal.      Pupils: Pupils are equal, round, and reactive to light.   Cardiovascular:      Rate and Rhythm: Normal rate and regular rhythm.      Pulses: Normal pulses.   Pulmonary:      Effort: Pulmonary effort is normal.      Breath sounds: Examination of the right-lower field reveals decreased breath sounds. Examination of the left-lower field reveals decreased breath sounds. Decreased breath sounds present.   Abdominal:      General: Bowel sounds are normal.      Palpations: Abdomen is soft.      Tenderness: There is abdominal tenderness (mild, diffuse). There is no guarding or rebound.   Musculoskeletal:         General: No swelling or tenderness.      Cervical back: Normal range of motion and neck supple.   Skin:     General: Skin is warm and dry.      Capillary Refill: Capillary refill takes less than 2 seconds.   Neurological:      General: No focal deficit present.      Mental Status: He is alert.    Psychiatric:         Mood and Affect: Mood normal.         Behavior: Behavior normal.         Cognition and Memory: Cognition is impaired. Memory is impaired.       Results Review     I reviewed the patient's new clinical results.                        No results found for: HGBA1C, POCGLU    No radiology results for the last day  Scheduled Medications  cetirizine, 5 mg, Oral, Daily  DULoxetine, 60 mg, Oral, Daily  miconazole, 1 application, Topical, Q12H  montelukast, 10 mg, Oral, Nightly  pantoprazole, 40 mg, Oral, Q AM  tamsulosin, 0.4 mg, Oral, Nightly    Infusions   Diet  Diet Soft Texture; Ground; GI Soft       Assessment/Plan     Active Hospital Problems    Diagnosis  POA   • **Sigmoid diverticulitis [K57.32]  Yes   • COPD (chronic obstructive pulmonary disease) (HCC) [J44.9]  Yes   • Benign essential tremor [G25.0]  Yes   • 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     Continue comfort measures. [facility] following, will await re-evaluation for disposition.    Discussed with patient and nursing staff.  Disposition to be determined.      Linden Espinal MD  [facility]  10/17/22  14:37 EDT

## 2022-10-18 VITALS
OXYGEN SATURATION: 91 % | HEIGHT: 72 IN | TEMPERATURE: 97.2 F | RESPIRATION RATE: 18 BRPM | BODY MASS INDEX: 24.25 KG/M2 | HEART RATE: 66 BPM | SYSTOLIC BLOOD PRESSURE: 152 MMHG | WEIGHT: 179.01 LBS | DIASTOLIC BLOOD PRESSURE: 80 MMHG

## 2022-10-18 RX ORDER — MORPHINE SULFATE 20 MG/ML
5 SOLUTION ORAL
Qty: 30 ML | Refills: 0 | Status: SHIPPED | OUTPATIENT
Start: 2022-10-18 | End: 2022-10-23

## 2022-10-18 RX ORDER — ACETAMINOPHEN 325 MG/1
650 TABLET ORAL EVERY 6 HOURS PRN
Qty: 56 TABLET | Refills: 0 | Status: SHIPPED | OUTPATIENT
Start: 2022-10-18 | End: 2022-10-25

## 2022-10-18 RX ADMIN — PANTOPRAZOLE SODIUM 40 MG: 40 TABLET, DELAYED RELEASE ORAL at 06:45

## 2022-10-18 RX ADMIN — DULOXETINE HYDROCHLORIDE 60 MG: 60 CAPSULE, DELAYED RELEASE ORAL at 10:00

## 2022-10-18 RX ADMIN — CETIRIZINE HYDROCHLORIDE 5 MG: 10 TABLET ORAL at 09:59

## 2022-10-18 RX ADMIN — MICONAZOLE NITRATE 1 APPLICATION: 2 POWDER TOPICAL at 09:59

## 2022-10-18 NOTE — PLAN OF CARE
Goal Outcome Evaluation:  Patient coughing while laying flat or with PO meds. 2L O2 in place.  All PO meds crushed in applesauce.   Patient alert and orientated.  Denied any pain overnight. Rested well between care and turns. Incontinent wrap and brief in place. Will continue to monitor per palliative goals of care.

## 2022-10-18 NOTE — DISCHARGE SUMMARY
Patient Name: Boni Vega  : 1940  MRN: [id number]    Date of Admission: 10/1/2022  Date of Discharge:  10/18/2022  Primary Care Physician: Radha Mata      Chief Complaint:   Abdominal Pain and Vomiting      Discharge Diagnoses     Active Hospital Problems    Diagnosis  POA   • **Sigmoid diverticulitis [K57.32]  Yes   • COPD (chronic obstructive pulmonary disease) (HCC) [J44.9]  Yes   • Benign essential tremor [G25.0]  Yes   • 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        Hospital Course       Patient is a 81-year-old male with a history of essential tremor status post deep brain stimulator, CVA complicated with history of aspiration and vascular dementia, COPD, essential hypertension, degenerative disc disease status post back surgery, GERD, presented to the ED complains of abdominal pain for 2 days.  Diagnosed with sigmoid diverticulitis and liver lesion.  Patient was treated for sigmoid diverticulitis with IV antibiotics, symptoms improved.  However liver lesion was felt to be likely malignant.  Upon further discussion with daughter about overall prognosis  and if further work-up done and  liver lesion is malignant .unlikely there is many treatment options patient can tolerate , due to multiple comorbidities and overall performance status..  Daughter decided to transition goals of care to DNR comfort care measures only and did not want any further work-up including liver biopsy.  Patient was transitioned to inpatient palliative care unit, palliative care protocol was initiated.  Hospice was consulted, patient was not eligible for inpatient hospice  and was ischarged  home with hospice.        Day of Discharge     Subjective:  Seen this morning.  Nocturnist overnight.  Alert, laying in bed.  Denies abdominal pain this morning.    Physical Exam:  Temp:  [97.2 °F (36.2 °C)-97.6 °F (36.4 °C)] 97.2 °F (36.2 °C)  Heart Rate:  [66-72] 66  Resp:  [18-19] 18  BP: (152-158)/(76-80) 152/80  Body mass index is 24.28 kg/m².  Physical Exam    General: Alert, laying in bed, no distress, chronically ill-appearing  HEENT: Normocephalic, atraumatic  CV: Regular rate and rhythm, no murmurs rubs or gallops  Lungs: Decreased, on L nasal cannula, nonlabored breathing abdomen:   Abdomen: Soft, mild generalized tenderness palpation, no rebound.  Extremities: No significant peripheral edema , weakness.      Consultants     Consult Orders (all) (From admission, onward)     Start     Ordered    10/11/22 1654  Inpatient Hospice / [facility] Consult  Once        Specialty:  Hospice and Palliative Medicine  Provider:  (Not yet assigned)    10/11/22 1654    10/07/22 1500  Inpatient Hospice / [facility] Consult  Once        Specialty:  Hospice and Palliative Medicine  Provider:  (Not yet assigned)    10/07/22 1459    10/06/22 1638  Inpatient Palliative Care Nurse Consult  Once        Provider:  (Not yet assigned)    10/06/22 1637    10/03/22 0702  Inpatient Pulmonology Consult  IN AM        Provider:  Lauro Laguna MD    10/02/22 1914    10/02/22 0025  Inpatient Gastroenterology Consult  Once        Specialty:  Gastroenterology  Provider:  Leopoldo Vizcarra MD    10/02/22 0025    10/02/22 0024  Inpatient General Surgery Consult  Once        Specialty:  General Surgery  Provider:  Edward Meyers Jr., MD    10/02/22 0025    10/01/22 2307  [facility] (on-call MD unless specified) Details  Once        Specialty:  Hospitalist  Provider:  (Not yet assigned)    10/01/22 2306              Procedures     * Surgery not found *      Imaging Results (All)     Procedure Component Value Units Date/Time    CT Abdomen With &  Without Contrast [[id number]] Collected: 10/03/22 1119     Updated: 10/05/22 1247    Narrative:      CT ABDOMEN WITH AND WITHOUT CONTRAST     HISTORY: Liver lesion. For characterization.     TECHNIQUE: Noncontrast axial images of the abdomen were obtained  followed by administration of intravenous contrast and imaging of the  abdomen in the arterial and portal venous phase. Coronal and sagittal  reformats were then obtained.     COMPARISON: CT dated 10/01/2022     FINDINGS: Since the prior CT there is increasing density seen in the  perisplenic and perihepatic region most consistent with hemoperitoneum  and hematoma. There is a heterogeneous lesion seen within the superior  right hepatic lobe that measures approximately 9.1 x 6.0 cm. A second  lesion is seen along the inferior right hepatic lobe posteriorly  measuring approximately 8.9 x 6.1 cm that is possibly hemorrhage and  ruptured. There is a third smaller lesion seen anteriorly within the  left hepatic lobe measuring up to 1.3 cm. The portal vein and its  intrahepatic branches are patent. The pancreas is normal. The spleen is  normal in size. Bilateral adrenal glands are normal. Both kidneys are  stable with partly imaged low-density lesions within the anterior cortex  of the midpole of the right kidney. The visualized bowel loops are  unremarkable. Marked atherosclerotic changes seen within the abdominal  aorta and its branches. Dilatation of the infrarenal abdominal aorta  measuring up to 3.2 cm with eccentric thrombus. There are markedly  atelectatic changes within bilateral lower lobes. Secretions and mucus  are seen within the partly aerated airways. Marked emphysematous change  is present.       Impression:      There is increasing hemoperitoneum and hematoma identified today. The CT  findings are most concerning for hepatic lesion that is hemorrhage and  subsequently ruptured. The underlying lesion may represent a  hepatocellular carcinoma. There are  additional lesions seen within the  liver as described above. These findings were discussed by telephone  with Dr. Vizcarra at the time of dictation.     Radiation dose reduction techniques were utilized, including automated  exposure control and exposure modulation based on body size.     This report was finalized on 10/5/2022 12:44 PM by Dr. Baldev Kraft M.D.       XR Chest 1 View [[id number]] Collected: 10/01/22 2259     Updated: 10/01/22 2303    Narrative:      SINGLE VIEW OF THE CHEST     HISTORY: Shortness of breath and hypoxia     COMPARISON: None available.     FINDINGS:  Cardiomegaly is present. There is no vascular congestion. Background  emphysematous changes are seen. There is some nonspecific bibasilar  consolidation, left greater than right. No obvious pneumothorax or  pleural effusion is seen. Left-sided generator is seen overlying the  left chest.       Impression:      Nonspecific bibasilar consolidation.     This report was finalized on 10/1/2022 11:00 PM by Dr. Soheila Mann M.D.       CT Abdomen Pelvis With Contrast [[id number]] Collected: 10/01/22 2222     Updated: 10/01/22 2245    Narrative:      CT ANGIOGRAM OF THE CHEST; CT OF THE ABDOMEN AND PELVIS WITH CONTRAST     HISTORY: Hypoxia and tachypnea. Abdominal pain.     COMPARISON: 01/23/2007     TECHNIQUE: Axial CT imaging was obtained through the thorax. IV contrast  was administered. Three-D reformatted images were obtained. Axial CT  imaging was obtained through the abdomen and pelvis. IV contrast was  administered.     FINDINGS:  CT ANGIOGRAM OF THE CHEST: There is dilatation of the ascending thoracic  aorta, measuring up to 4 cm. Proximal descending thoracic aorta is  dilated, measuring 3.3 cm. Distal descending thoracic aorta tapers to  normal caliber. No acute pulmonary thromboembolus is seen. The thyroid  gland, trachea, and esophagus appear unremarkable. There are coronary  artery calcifications. There are advanced background  emphysematous  changes. Dependent consolidation is noted bilaterally. Some of this may  reflect atelectasis, but pneumonia may be present as well, particularly  within the left lower lobe. The patient has a nodule within the left  upper lobe, which measures up to 1.5 cm. Malignancy cannot be excluded.  There is also an enlarged left hilar node, which measures up to 2.3 x  1.5 cm. Patient does appear to have some debris within the bronchi to  the right lower lobe. Thyroid gland and trachea are unremarkable.  Left-sided generator is noted overlying the left chest wall. There are  changes of prior vertebroplasty at L1.     CT OF THE ABDOMEN AND PELVIS: Images are severely compromised by motion  and streak artifact. Heterogeneous lesions are seen within the right  lobe of the liver, difficult to fully assess, given the presence of the  streak artifact from the patient's arms. However, the larger measures up  to 7.9 x 5.4 cm. Malignancy cannot be excluded. 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 Patient does have intraluminal mural thrombus. Prostate gland  contains dystrophic calcifications. Urinary bladder contains excreted  contrast material. There is colonic diverticulosis. Proximal sigmoid  colon does appear thick walled, with some adjacent pericolonic soft  tissue stranding. Diverticulitis is not excluded. There is no  extraluminal gas or organized fluid collection suggest perforation with  abscess formation. Patient does have some free fluid within the  paracolic gutters. The patient is osteoporotic. Intramedullary ritesh is  identified within the proximal left femur. Calcified granulomata are  seen within the spleen. Pancreas is atrophic. Gallbladder is absent.  Kidneys enhance symmetrically. There is no hydronephrosis.       Impression:         1. Bibasilar consolidation may reflect atelectasis, although correlation  with any evidence of pneumonia is  recommended.  2. Left upper lobe pulmonary nodule, as well as enlarged left hilar  lymph node. Malignancy cannot be excluded.  3. Apparent heterogeneous lesions within the right lobe of the liver.  Neoplasm versus abscess would be considerations. Full assessment of the  liver lesions is severely limited due to streak artifact and motion  artifact. Liver protocol CT or MRI could be considered for further  assessment.  4. Diverticulosis, as well as a thick walled segment of sigmoid colon.  This may reflect diverticulitis, although follow-up colonoscopy is  suggested to exclude any underlying neoplasm.  5. Patient does have some free fluid tracking along the paracolic  gutters bilaterally. There may be some additional free fluid around the  liver and spleen.     Radiation dose reduction techniques were utilized, including automated  exposure control and exposure modulation based on body size.     This report was finalized on 10/1/2022 10:42 PM by Dr. Soheila Mann M.D.       CT Angiogram Chest [[id number]] Collected: 10/01/22 2222     Updated: 10/01/22 2245    Narrative:      CT ANGIOGRAM OF THE CHEST; CT OF THE ABDOMEN AND PELVIS WITH CONTRAST     HISTORY: Hypoxia and tachypnea. Abdominal pain.     COMPARISON: 01/23/2007     TECHNIQUE: Axial CT imaging was obtained through the thorax. IV contrast  was administered. Three-D reformatted images were obtained. Axial CT  imaging was obtained through the abdomen and pelvis. IV contrast was  administered.     FINDINGS:  CT ANGIOGRAM OF THE CHEST: There is dilatation of the ascending thoracic  aorta, measuring up to 4 cm. Proximal descending thoracic aorta is  dilated, measuring 3.3 cm. Distal descending thoracic aorta tapers to  normal caliber. No acute pulmonary thromboembolus is seen. The thyroid  gland, trachea, and esophagus appear unremarkable. There are coronary  artery calcifications. There are advanced background emphysematous  changes. Dependent consolidation is  noted bilaterally. Some of this may  reflect atelectasis, but pneumonia may be present as well, particularly  within the left lower lobe. The patient has a nodule within the left  upper lobe, which measures up to 1.5 cm. Malignancy cannot be excluded.  There is also an enlarged left hilar node, which measures up to 2.3 x  1.5 cm. Patient does appear to have some debris within the bronchi to  the right lower lobe. Thyroid gland and trachea are unremarkable.  Left-sided generator is noted overlying the left chest wall. There are  changes of prior vertebroplasty at L1.     CT OF THE ABDOMEN AND PELVIS: Images are severely compromised by motion  and streak artifact. Heterogeneous lesions are seen within the right  lobe of the liver, difficult to fully assess, given the presence of the  streak artifact from the patient's arms. However, the larger measures up  to 7.9 x 5.4 cm. Malignancy cannot be excluded. 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 Patient does have intraluminal mural thrombus. Prostate gland  contains dystrophic calcifications. Urinary bladder contains excreted  contrast material. There is colonic diverticulosis. Proximal sigmoid  colon does appear thick walled, with some adjacent pericolonic soft  tissue stranding. Diverticulitis is not excluded. There is no  extraluminal gas or organized fluid collection suggest perforation with  abscess formation. Patient does have some free fluid within the  paracolic gutters. The patient is osteoporotic. Intramedullary ritesh is  identified within the proximal left femur. Calcified granulomata are  seen within the spleen. Pancreas is atrophic. Gallbladder is absent.  Kidneys enhance symmetrically. There is no hydronephrosis.       Impression:         1. Bibasilar consolidation may reflect atelectasis, although correlation  with any evidence of pneumonia is recommended.  2. Left upper lobe pulmonary nodule, as well  as enlarged left hilar  lymph node. Malignancy cannot be excluded.  3. Apparent heterogeneous lesions within the right lobe of the liver.  Neoplasm versus abscess would be considerations. Full assessment of the  liver lesions is severely limited due to streak artifact and motion  artifact. Liver protocol CT or MRI could be considered for further  assessment.  4. Diverticulosis, as well as a thick walled segment of sigmoid colon.  This may reflect diverticulitis, although follow-up colonoscopy is  suggested to exclude any underlying neoplasm.  5. Patient does have some free fluid tracking along the paracolic  gutters bilaterally. There may be some additional free fluid around the  liver and spleen.     Radiation dose reduction techniques were utilized, including automated  exposure control and exposure modulation based on body size.     This report was finalized on 10/1/2022 10:42 PM by Dr. Soheila Mann M.D.               Pertinent Labs         Estimated Creatinine Clearance: 74.3 mL/min (by C-G formula based on SCr of 0.85 mg/dL).                Invalid input(s): LDLCALC          Test Results Pending at Discharge       Discharge Details        Discharge Medications      New Medications      Instructions Start Date   acetaminophen 325 MG tablet  Commonly known as: TYLENOL   650 mg, Oral, Every 6 Hours PRN      morphine 20 mg/mL solution concentrated solution   5 mg, Oral, Every 1 Hour PRN         Continue These Medications      Instructions Start Date   DULoxetine 60 MG capsule  Commonly known as: CYMBALTA   60 mg, Oral, Daily      tamsulosin 0.4 MG capsule 24 hr capsule  Commonly known as: FLOMAX   1 capsule, Oral, Nightly         Stop These Medications    aspirin 81 MG chewable tablet     Claritin 10 MG tablet  Generic drug: loratadine     lisinopril 20 MG tablet  Commonly known as: PRINIVIL,ZESTRIL     montelukast 10 MG tablet  Commonly known as: SINGULAIR     multivitamin with minerals tablet tablet      pantoprazole 20 MG EC tablet  Commonly known as: PROTONIX            No Active Allergies    Discharge Disposition:  Hospice/Home      Discharge Diet:  Diet Order   Procedures   • Diet Soft Texture; Ground; GI Soft       Discharge Activity:       CODE STATUS:    Code Status and Medical Interventions:   Ordered at: 10/13/22 1224     Code Status (Patient has no pulse and is not breathing):    No CPR (Do Not Attempt to Resuscitate)     Medical Interventions (Patient has pulse or is breathing):    Comfort Measures       Future Appointments   Date Time Provider Department Center   10/18/2022  7:30 PM EMS MED [address] [place] [address] [facility]      Contact information for follow-up providers     Radha Mata .    Specialty: Nurse Practitioner  Contact information:  [address]  [address]  [facility] [address] [phone]                   Contact information for after-discharge care     Home Medical Care     [facility] .    Service: Home Hospice  Contact information:  [address]  [facility] [address] [phone]                             Time Spent on Discharge:  Greater than 30 minutes      Adrianne Gomez MD  [facility]  10/18/22  10:29 EDT

## 2022-10-18 NOTE — PROGRESS NOTES
Discharge Planning Assessment  [facility]     Patient Name: Boni Vega  MRN: [id number]  Today's Date: 10/18/2022    Admit Date: 10/1/2022    Plan: Inpt Hospice vs home with Hospice   Discharge Needs Assessment    No documentation.                Discharge Plan     Row Name 10/18/22 0944       Plan    Plan Comments spoke with Leanna/daughter and the discharge plan is home with [facility]. Leanna/daughter the caregiver. Will need DME the day of discharge. Meds to beds. 2 stairs into home.   New mattress will be needed along with oxygen. The current hospital bed is rented thru [employer] with the  trapeze bar. Updated MD and RN. SYDNIE Rendon, RN CCP              Continued Care and Services - Admitted Since 10/1/2022     Destination     Service Provider Request Status Selected Services Address Phone Fax Patient Preferred    [facility] Pending - Request Sent N/A [address] [address] [phone] --    [facility] Pending - Request Sent N/A [address] [place] [id number] [phone] --    [facility] Declined  Out of network N/A [address] [facility] [address] [phone] --          Home Medical Care     Service Provider Request Status Selected Services Address Phone Fax Patient Preferred    [facility]  Selected [facility] H[facility]ice [address] [place] [id number] [phone] --              Expected Discharge Date and Time     Expected Discharge Date Expected Discharge Time    Oct 19, 2022          Demographic Summary    No documentation.                Functional Status    No documentation.                Psychosocial    No documentation.                Abuse/Neglect    No documentation.                Legal    No documentation.                Substance Abuse    No documentation.                Patient Forms    No documentation.                    Terra Rendon RN

## 2022-10-18 NOTE — NURSING NOTE
Records reviewed. Spoke with YO Meneses for updated pt status. Met with family. Reviewed explanation of services and answered all questions.     Pt is eligible for routine, home [facility] services and would like to discharge home today. Attending agreeable to discharge pt and will sent pt home with morphine. Equipment has been ordered and will be delivered to pts home. Pt will transfer home via EMS. EMS DNR complete and in chart. EMS scheduled for 1930.    Updated YO Meneses. [facility] will assume care when pt arrives home. If you have any questions, please contact [facility] customer support at [phone].     Thank you for allowing [facility] to participate in the care of this patient,    Kassie Mcguire RN  Referral and Admissions Coordinator  [facility]  \

## 2022-10-18 NOTE — PROGRESS NOTES
"Physicians Statement of Medical Necessity for  Ambulance Transportation    GENERAL INFORMATION     Name: Boni Vega  YOB: 1940  Medicare #: [employer] Medicare replacement  #[id number]    Transport Date: 10/18/22 (Valid for round trips this date, or for scheduled repetitive trips for 60 days from the date signed below.)  Origin: [address] Destination: [address] [place] [address], [facility] home [phone]  Is the Patient's stay covered under Medicare Part A (PPS/DRG?)Yes  Closest appropriate facility? Yes  If this a hosp-hosp transfer? No  Is this a hospice patient? No    MEDICAL NECESSITY QUESTIONAIRE    Ambulance Transportation is medically necessary only if other means of transportation are contraindicated or would be potentially harmful to the patient.  To meet this requirement, the patient must be either \"bed confined\" or suffer from a condition such that transport by means other than an ambulance is contraindicated by the patient's condition.  The following questions must be answered by the healthcare professional signing below for this form to be valid:     1) Describe the MEDICAL CONDITION (physical and/or mental) of this patient AT THE TIME OF AMBULANCE TRANSPORT that requires the patient to be transported in an ambulance, and why transport by other means is contraindicated by the patient's condition: stable  Past Medical History:   Diagnosis Date   • Aspiration pneumonia (HCC)    • COPD (chronic obstructive pulmonary disease) (HCC)    • Dysphagia due to old stroke     Family stated x 10 years since CVA   • Hearing decreased    • S/P deep brain stimulator placement     essential tremors--15 yrs ago   • Stroke (HCC)     left hand dexterity affected and aspiration issues after--15 yrs ago      Past Surgical History:   Procedure Laterality Date   • CHOLECYSTECTOMY     • HIP FRACTURE SURGERY Left     1 1/2 yrs ago---has 2 large screws in place   • KYPHOPLASTY      1 " "1/2 yrs ago      2) Is this patient \"bed confined\" as defined below?Yes   To be \"bed confined\" the patient must satisfy all three of the following criteria:  (1) unable to get up from bed without assistance; AND (2) unable to ambulate;  AND (3) unable to sit in a chair or wheelchair.  3) Can this patient safely be transported by car or wheelchair van (I.e., may safely sit during transport, without an attendant or monitoring?)No   4. In addition to completing questions 1-3 above, please check any of the following conditions that apply*:          *Note: supporting documentation for any boxes checked must be maintained in the patient's medical records Requires oxygen - unable to self administer      SIGNATURE OF PHYSICIAN OR OTHER AUTHORIZED HEALTHCARE PROFESSIONAL    I certify that the above information is true and correct based on my evaluation of this patient, and represent that the patient requires transport by ambulance and that other forms of transport are contraindicated.  I understand that this information will be used by the Centers for Medicare and Medicaid Services (CMS) to support the determiniation of medical necessity for ambulance services, and I represent that I have personal knowledge of the patient's condition at the time of transport.     X   If this box is checked, I also certify that the patient is physically or mentally incapable of signing the ambulance service's claim form and that the institution with which I am affiliated has furnished care, services or assistance to the patient.  My signature below is made on behalf of the patient pursuant to 42 .36(b)(4). In accordance with 42 .37, the specific reason(s) that the patient is physically or mentally incapable of signing the claim for is as follows:     Signature of Physician or Healthcare Professional  Date/Time:  10/18/22     (For Scheduled repetitive transport, this form is not valid for transports performed more than 60 days " after this date).                                                                                                                                            --------------------------------------------------------------------------------------------  Printed Name and Credentials of Physician or Authorized Healthcare Professional     *Form must be signed by patient's attending physician for scheduled, repetitive transports,.  For non-repetitive ambulance transports, if unable to obtain the signature of the attending physician, any of the following may sign (please select below):     Physician  Clinical Nurse Specialist  X Registered Nurse     Physician Assistant  Discharge Planner  Licensed Practical Nurse     Nurse Practitioner

## 2022-10-18 NOTE — PLAN OF CARE
Goal Outcome Evaluation:  Plan of Care Reviewed With: patient        Progress: no change  Outcome Evaluation: Pt. insisted on eating his meals even though he did aspirate several times Pt. was able to cough and clear his airway on his own. He ate most of his meals Pt. will be going to daughters home this evening with the help of hospice. Pt. is still alert and oriented to situation and names. He has become confused on the time and day. Pt. is pleasant and cooperative with care. Meds given in pudding without any difficulty.

## 2022-10-19 NOTE — PROGRESS NOTES
Case Management Discharge Note      Final Note: Discharged to home with [facility]. SYDNIE jurado RN [facility]         Selected Continued Care - Discharged on 10/18/2022 Admission date: 10/1/2022 - Discharge disposition: Hospice/Home    Destination    No services have been selected for the patient.              Durable Medical Equipment    No services have been selected for the patient.              Dialysis/Infusion    No services have been selected for the patient.              Home Medical Care     Service Provider Selected Services Address Phone Fax Patient Preferred    [facility] [address], [place] [id number] [phone] --          Therapy    No services have been selected for the patient.              Community Resources    No services have been selected for the patient.              Community & DME    No services have been selected for the patient.                  Transportation Services  Ambulance: [facility]    Final Discharge Disposition Code: 50 - home with hospice

## 2022-10-19 NOTE — PROGRESS NOTES
Enter Query Response Below      Query Response:   - sepsis was ruled out           If applicable, please update the problem list.        Patient: Boni Vega        : 1940  Account: [id number]           Admit Date: 10/1/2022        How to Respond to this query:       a. Click New Note     b. Answer query within the yellow box.                c. Update the Problem List, if applicable.      If you have any questions about this query contact me at: [email or web address]     Dr. Gomez,    Patient presented on 10/1 with WBCs 20.61, glucose 167 without mention of diabetes, lactate 2.5 then 3.4, and procalcitonin 0.09.  Patient was determined to have diverticulitis and was treated with:  IV fluids  IV vancomycin 10/1 - 10/5  IV cefepime 10/1 - 10/2  IV Zosyn 10/3 - 10/7  Sepsis is documented in ED documentation and in progress notes dated 10/4, 10/5, 10/10 through 10/13.  Sepsis is not included in the discharge summary.    Can this be further clarified as:    - sepsis was ruled in  - sepsis was ruled out  - other ___________________  - clinically indeterminable    By submitting this query, we are merely seeking further clarification of documentation to accurately reflect all conditions that you are monitoring, evaluating, treating or that extend the hospitalization or utilize additional resources of care. Please utilize your independent clinical judgment when addressing the question(s) above.     This query and your response, once completed, will be entered into the legal medical record.    Sincerely,  Nel Albert RN [facility] [place] [facility] [facility]  Clinical Documentation Integrity Program